# Patient Record
Sex: MALE | Race: WHITE | NOT HISPANIC OR LATINO | URBAN - METROPOLITAN AREA
[De-identification: names, ages, dates, MRNs, and addresses within clinical notes are randomized per-mention and may not be internally consistent; named-entity substitution may affect disease eponyms.]

---

## 2017-08-31 ENCOUNTER — HISTORICAL (OUTPATIENT)
Dept: RADIOLOGY | Facility: HOSPITAL | Age: 57
End: 2017-08-31

## 2017-09-20 ENCOUNTER — HISTORICAL (OUTPATIENT)
Dept: RADIOLOGY | Facility: HOSPITAL | Age: 57
End: 2017-09-20

## 2017-10-03 ENCOUNTER — HISTORICAL (OUTPATIENT)
Dept: ADMINISTRATIVE | Facility: HOSPITAL | Age: 57
End: 2017-10-03

## 2017-10-06 ENCOUNTER — HISTORICAL (OUTPATIENT)
Dept: RADIOLOGY | Facility: HOSPITAL | Age: 57
End: 2017-10-06

## 2017-11-28 ENCOUNTER — HISTORICAL (OUTPATIENT)
Dept: LAB | Facility: HOSPITAL | Age: 57
End: 2017-11-28

## 2017-11-28 ENCOUNTER — HISTORICAL (OUTPATIENT)
Dept: PREADMISSION TESTING | Facility: HOSPITAL | Age: 57
End: 2017-11-28

## 2017-11-28 LAB
ABS NEUT (OLG): 4.59 X10(3)/MCL (ref 2.1–9.2)
ALBUMIN SERPL-MCNC: 4.1 GM/DL (ref 3.4–5)
ALBUMIN/GLOB SERPL: 1.4 RATIO (ref 1.1–2)
ALP SERPL-CCNC: 58 UNIT/L (ref 50–136)
ALT SERPL-CCNC: 65 UNIT/L (ref 12–78)
APTT PPP: 29.7 SECOND(S) (ref 24.8–36.9)
AST SERPL-CCNC: 38 UNIT/L (ref 15–37)
BASOPHILS # BLD AUTO: 0 X10(3)/MCL (ref 0–0.2)
BASOPHILS NFR BLD AUTO: 0 %
BILIRUB SERPL-MCNC: 0.5 MG/DL (ref 0.2–1)
BILIRUBIN DIRECT+TOT PNL SERPL-MCNC: 0.2 MG/DL (ref 0–0.5)
BILIRUBIN DIRECT+TOT PNL SERPL-MCNC: 0.3 MG/DL (ref 0–0.8)
BUN SERPL-MCNC: 18 MG/DL (ref 7–18)
CALCIUM SERPL-MCNC: 10.9 MG/DL (ref 8.5–10.1)
CHLORIDE SERPL-SCNC: 105 MMOL/L (ref 98–107)
CO2 SERPL-SCNC: 27 MMOL/L (ref 21–32)
CREAT SERPL-MCNC: 1.03 MG/DL (ref 0.7–1.3)
EOSINOPHIL # BLD AUTO: 0.3 X10(3)/MCL (ref 0–0.9)
EOSINOPHIL NFR BLD AUTO: 3 %
ERYTHROCYTE [DISTWIDTH] IN BLOOD BY AUTOMATED COUNT: 13.2 % (ref 11.5–17)
GLOBULIN SER-MCNC: 3 GM/DL (ref 2.4–3.5)
GLUCOSE SERPL-MCNC: 70 MG/DL (ref 74–106)
HCT VFR BLD AUTO: 54.4 % (ref 42–52)
HGB BLD-MCNC: 18.5 GM/DL (ref 14–18)
INR PPP: 0.93 (ref 0–1.27)
LYMPHOCYTES # BLD AUTO: 2.7 X10(3)/MCL (ref 0.6–4.6)
LYMPHOCYTES NFR BLD AUTO: 33 %
MCH RBC QN AUTO: 31.6 PG (ref 27–31)
MCHC RBC AUTO-ENTMCNC: 34 GM/DL (ref 33–36)
MCV RBC AUTO: 92.8 FL (ref 80–94)
MONOCYTES # BLD AUTO: 0.6 X10(3)/MCL (ref 0.1–1.3)
MONOCYTES NFR BLD AUTO: 7 %
NEUTROPHILS # BLD AUTO: 4.59 X10(3)/MCL (ref 2.1–9.2)
NEUTROPHILS NFR BLD AUTO: 56 %
PLATELET # BLD AUTO: 162 X10(3)/MCL (ref 130–400)
PMV BLD AUTO: 9.5 FL (ref 9.4–12.4)
POTASSIUM SERPL-SCNC: 4.5 MMOL/L (ref 3.5–5.1)
PROT SERPL-MCNC: 7.1 GM/DL (ref 6.4–8.2)
PROTHROMBIN TIME: 12.7 SECOND(S) (ref 12.2–14.7)
RBC # BLD AUTO: 5.86 X10(6)/MCL (ref 4.7–6.1)
SODIUM SERPL-SCNC: 140 MMOL/L (ref 136–145)
WBC # SPEC AUTO: 8.2 X10(3)/MCL (ref 4.5–11.5)

## 2017-12-04 ENCOUNTER — HISTORICAL (OUTPATIENT)
Dept: SURGERY | Facility: HOSPITAL | Age: 57
End: 2017-12-04

## 2018-06-20 LAB — CRC RECOMMENDATION EXT: NORMAL

## 2019-03-12 ENCOUNTER — HISTORICAL (OUTPATIENT)
Dept: LAB | Facility: HOSPITAL | Age: 59
End: 2019-03-12

## 2019-06-17 ENCOUNTER — HISTORICAL (OUTPATIENT)
Dept: ADMINISTRATIVE | Facility: HOSPITAL | Age: 59
End: 2019-06-17

## 2019-06-24 ENCOUNTER — HISTORICAL (OUTPATIENT)
Dept: ADMINISTRATIVE | Facility: HOSPITAL | Age: 59
End: 2019-06-24

## 2019-07-01 ENCOUNTER — HISTORICAL (OUTPATIENT)
Dept: ADMINISTRATIVE | Facility: HOSPITAL | Age: 59
End: 2019-07-01

## 2019-08-13 ENCOUNTER — HISTORICAL (OUTPATIENT)
Dept: ADMINISTRATIVE | Facility: HOSPITAL | Age: 59
End: 2019-08-13

## 2019-09-03 ENCOUNTER — HISTORICAL (OUTPATIENT)
Dept: RADIOLOGY | Facility: HOSPITAL | Age: 59
End: 2019-09-03

## 2019-09-11 LAB
BUN SERPL-MCNC: 20 MG/DL (ref 7–18)
CALCIUM SERPL-MCNC: 10.2 MG/DL (ref 8.5–10.1)
CHLORIDE SERPL-SCNC: 109 MMOL/L (ref 98–107)
CO2 SERPL-SCNC: 26 MMOL/L (ref 21–32)
CREAT SERPL-MCNC: 1.14 MG/DL (ref 0.7–1.3)
CREAT/UREA NIT SERPL: 18
GLUCOSE SERPL-MCNC: 86 MG/DL (ref 74–106)
POTASSIUM SERPL-SCNC: 4.3 MMOL/L (ref 3.5–5.1)
SODIUM SERPL-SCNC: 140 MMOL/L (ref 136–145)

## 2019-09-26 ENCOUNTER — HISTORICAL (OUTPATIENT)
Dept: SURGERY | Facility: HOSPITAL | Age: 59
End: 2019-09-26

## 2019-11-13 ENCOUNTER — HISTORICAL (OUTPATIENT)
Dept: ADMINISTRATIVE | Facility: HOSPITAL | Age: 59
End: 2019-11-13

## 2019-12-30 ENCOUNTER — HISTORICAL (OUTPATIENT)
Dept: ADMINISTRATIVE | Facility: HOSPITAL | Age: 59
End: 2019-12-30

## 2020-02-20 ENCOUNTER — HISTORICAL (OUTPATIENT)
Dept: LAB | Facility: HOSPITAL | Age: 60
End: 2020-02-20

## 2020-02-20 LAB
ABS NEUT (OLG): 4.28 X10(3)/MCL (ref 2.1–9.2)
ALBUMIN SERPL-MCNC: 3.5 GM/DL (ref 3.4–5)
ALBUMIN/GLOB SERPL: 0.9 RATIO (ref 1.1–2)
ALP SERPL-CCNC: 80 UNIT/L (ref 50–136)
ALT SERPL-CCNC: 72 UNIT/L (ref 12–78)
APPEARANCE, UA: CLEAR
AST SERPL-CCNC: 49 UNIT/L (ref 15–37)
BACTERIA SPEC CULT: ABNORMAL /HPF
BASOPHILS # BLD AUTO: 0 X10(3)/MCL (ref 0–0.2)
BASOPHILS NFR BLD AUTO: 1 %
BILIRUB SERPL-MCNC: 0.7 MG/DL (ref 0.2–1)
BILIRUB UR QL STRIP: NEGATIVE
BILIRUBIN DIRECT+TOT PNL SERPL-MCNC: 0.2 MG/DL (ref 0–0.5)
BILIRUBIN DIRECT+TOT PNL SERPL-MCNC: 0.5 MG/DL (ref 0–0.8)
BUN SERPL-MCNC: 35 MG/DL (ref 7–18)
CALCIUM SERPL-MCNC: 10.2 MG/DL (ref 8.5–10.1)
CHLORIDE SERPL-SCNC: 106 MMOL/L (ref 98–107)
CHOLEST SERPL-MCNC: 132 MG/DL (ref 0–200)
CHOLEST/HDLC SERPL: 2.7 {RATIO} (ref 0–5)
CO2 SERPL-SCNC: 25 MMOL/L (ref 21–32)
COLOR UR: ABNORMAL
CREAT SERPL-MCNC: 1.05 MG/DL (ref 0.7–1.3)
EOSINOPHIL # BLD AUTO: 0.2 X10(3)/MCL (ref 0–0.9)
EOSINOPHIL NFR BLD AUTO: 3 %
ERYTHROCYTE [DISTWIDTH] IN BLOOD BY AUTOMATED COUNT: 15.4 % (ref 11.5–17)
GLOBULIN SER-MCNC: 3.8 GM/DL (ref 2.4–3.5)
GLUCOSE (UA): NEGATIVE
GLUCOSE SERPL-MCNC: 82 MG/DL (ref 74–106)
HCT VFR BLD AUTO: 56.9 % (ref 42–52)
HDLC SERPL-MCNC: 49 MG/DL (ref 35–60)
HGB BLD-MCNC: 18.2 GM/DL (ref 14–18)
HGB UR QL STRIP: ABNORMAL
INFLUENZA A ANTIGEN, POC: NEGATIVE
INFLUENZA B ANTIGEN, POC: NEGATIVE
KETONES UR QL STRIP: ABNORMAL
LDLC SERPL CALC-MCNC: 69 MG/DL (ref 0–129)
LEUKOCYTE ESTERASE UR QL STRIP: ABNORMAL
LYMPHOCYTES # BLD AUTO: 1.9 X10(3)/MCL (ref 0.6–4.6)
LYMPHOCYTES NFR BLD AUTO: 27 %
MCH RBC QN AUTO: 29 PG (ref 27–31)
MCHC RBC AUTO-ENTMCNC: 32 GM/DL (ref 33–36)
MCV RBC AUTO: 90.6 FL (ref 80–94)
MONOCYTES # BLD AUTO: 0.6 X10(3)/MCL (ref 0.1–1.3)
MONOCYTES NFR BLD AUTO: 8 %
NEUTROPHILS # BLD AUTO: 4.28 X10(3)/MCL (ref 2.1–9.2)
NEUTROPHILS NFR BLD AUTO: 61 %
NITRITE UR QL STRIP: POSITIVE
PH UR STRIP: 5.5 [PH] (ref 5–9)
PLATELET # BLD AUTO: 172 X10(3)/MCL (ref 130–400)
PMV BLD AUTO: 9.3 FL (ref 9.4–12.4)
POTASSIUM SERPL-SCNC: 4.4 MMOL/L (ref 3.5–5.1)
PROT SERPL-MCNC: 7.3 GM/DL (ref 6.4–8.2)
PROT UR QL STRIP: ABNORMAL
PSA SERPL-MCNC: 33.9 NG/ML (ref 0–4)
RBC # BLD AUTO: 6.28 X10(6)/MCL (ref 4.7–6.1)
RBC #/AREA URNS HPF: ABNORMAL /[HPF]
SODIUM SERPL-SCNC: 139 MMOL/L (ref 136–145)
SP GR UR STRIP: 1.03 (ref 1–1.03)
SQUAMOUS EPITHELIAL, UA: ABNORMAL
TRIGL SERPL-MCNC: 71 MG/DL (ref 30–150)
TSH SERPL-ACNC: 2.61 MIU/L (ref 0.36–3.74)
UROBILINOGEN UR STRIP-ACNC: 0.2
VLDLC SERPL CALC-MCNC: 14 MG/DL
WBC # SPEC AUTO: 7 X10(3)/MCL (ref 4.5–11.5)
WBC #/AREA URNS HPF: 90 /HPF (ref 0–3)

## 2020-03-10 LAB
INFLUENZA A ANTIGEN, POC: NEGATIVE
INFLUENZA B ANTIGEN, POC: NEGATIVE

## 2020-05-20 ENCOUNTER — HISTORICAL (OUTPATIENT)
Dept: RADIOLOGY | Facility: HOSPITAL | Age: 60
End: 2020-05-20

## 2020-05-20 LAB — POC CREATININE: 1 MG/DL (ref 0.6–1.3)

## 2020-06-05 ENCOUNTER — HISTORICAL (OUTPATIENT)
Dept: ADMINISTRATIVE | Facility: HOSPITAL | Age: 60
End: 2020-06-05

## 2021-03-25 ENCOUNTER — HISTORICAL (OUTPATIENT)
Dept: RADIOLOGY | Facility: HOSPITAL | Age: 61
End: 2021-03-25

## 2021-04-13 ENCOUNTER — HISTORICAL (OUTPATIENT)
Dept: CARDIOLOGY | Facility: HOSPITAL | Age: 61
End: 2021-04-13

## 2022-02-22 ENCOUNTER — HISTORICAL (OUTPATIENT)
Dept: ADMINISTRATIVE | Facility: HOSPITAL | Age: 62
End: 2022-02-22

## 2022-03-08 ENCOUNTER — HISTORICAL (OUTPATIENT)
Dept: ADMINISTRATIVE | Facility: HOSPITAL | Age: 62
End: 2022-03-08

## 2022-04-12 ENCOUNTER — HISTORICAL (OUTPATIENT)
Dept: ADMINISTRATIVE | Facility: HOSPITAL | Age: 62
End: 2022-04-12
Payer: COMMERCIAL

## 2022-04-30 VITALS
SYSTOLIC BLOOD PRESSURE: 100 MMHG | BODY MASS INDEX: 34 KG/M2 | WEIGHT: 288 LBS | OXYGEN SATURATION: 99 % | DIASTOLIC BLOOD PRESSURE: 80 MMHG | HEIGHT: 77 IN

## 2022-04-30 NOTE — OP NOTE
DATE OF SURGERY:    09/26/2019    SURGEON:  Pablito Abad Jr, MD  ASSISTANT:  None    PREOPERATIVE DIAGNOSIS:  Right medial femoral condyle osteochondral defect.    POSTOPERATIVE DIAGNOSES:    1. Right medial femoral condyle osteochondral defect.  2. Right lateral meniscus tear.    PROCEDURE:    1. Right knee arthroscopic osteochondral allograft transfer.  2. Right knee arthroscopic partial lateral meniscectomy.    ANESTHESIA:  General plus regional.    COMPLICATIONS:  None.    IMPLANTS:  Three allograft 10 mm osteochondral plugs.    HISTORY OF PRESENT ILLNESS:  Zachery is a very pleasant 59-year-old who was carrying a mattress up the stairs and sustained an osteochondral lesion to the medial femoral condyle of his right knee.  He had persistent pain medially with mechanical symptoms.  MRI showed the osteochondral defect.  I recommended osteochondral allograft transfer.  I discussed with him the risks, benefits, and alternatives to therapy, and he elected to proceed.    DESCRIPTION OF PROCEDURE:  Zachery was initially seen in the preoperative unit where his History and Physical were reviewed without change.  His knee was marked. Consents were reviewed.  All questions were answered.  He was taken to the operating room and placed supine on the operating table where general anesthesia was induced.  His right lower extremity was prepped and draped in a sterile fashion.  Attending led timeout, confirmed the operative side.  Preoperative antibiotics were administered.  I then began the procedure.     I started with a standard arthroscopic portal and introduced the trocar atraumatically into the knee.  His patellofemoral joint was clean, his medial and lateral gutters were clear, I came down the medial side.  He had a large osteochondral defect which was approximately 3 cm x 1 cm.  This was in a striped type fashion.  His tibial plateau was intact.  His medial meniscus was intact.  I turned my attention to the  notch.  The ACL was intact.  His lateral meniscus had a central tear which I debrided with a shaver.  This was located on the posterior horn.  His lateral cartilage was intact.  I turned my attention back to the medial side.  I made a large portal in line with the defect and then used the Synthes system in order to drill a perpendicular hole at 10 mm.  A started far posteriorly and then gradually worked mainly anterior along the condyle, so held in flexion.  I drilled and then measured the plug at a size 12 mm and gently impacted the plug in place after cleaning the socket of debris. I then moved anteriorly and slightly overlapped the next plug and impacted this again in place flush with the surrounding articular cartilage.  I then moved far anteriorly and again over-reamed to a size 13-14 mm plug and then again impacted a 3rd plug in place.  On this anterior plug, I left a cortical rim to separate my 2nd and 3rd plug, so to not create instability.  After this, I took my final arthroscopic pictures, I then drained the arthroscopic fluid, and closed the incision in layers.  A sterile dressing was placed.  He was awoken from anesthesia and transferred to the postop unit in good condition.        ______________________________  MD NELDA Carver Jr/VIRGIL  DD:  09/26/2019  Time:  12:37PM  DT:  09/26/2019  Time:  01:09PM  Job #:  [id number]

## 2022-04-30 NOTE — OP NOTE
DATE OF SURGERY:    12/04/2017    SURGEON:  Dnay Denson MD    PREOPERATIVE DIAGNOSIS:  Left parotid mass.    POSTOPERATIVE DIAGNOSIS:  Left parotid mass.    PROCEDURE PERFORMED:  Left total parotidectomy with removal of parotid mass on the left with preservation of facial nerve.    ANESTHESIA:  General.    INDICATIONS FOR PROCEDURE:  This is a pleasant gentleman with a history of a left parotid mass.  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  The patient was counseled on the risks, benefits and indications including possible facial palsy and wished to proceed forward.    PROCEDURE:  Patient was brought to the operative suite and placed in the supine position.  Anesthesia administered general anesthesia with endotracheal intubation using a short-acting paralytic.  The facial nerve monitoring was calibrated appropriately, and the patient was then prepped and draped in a sterile fashion.  The patient's incision was then injected with 3 cc of 1% lidocaine with 1:100,000 epinephrine.  A modified Jean incision was then made with a #15 blade.  A soft tissue flap was elevated anteriorly overlying the mass over the starting of the superior aspect of the incision.  Dissection continued medially along the patient's cartilaginous external auditory canal.  The temporal meatal suture line was then palpated and dissection carefully ensued to aid in visualization.  We then turned our attention to the tail of the parotid overlying the sternocleidomastoid muscle.  This was carefully elevated and bluntly dissected off the sternocleidomastoid muscle.  Now that full view of the area was in visualization, the dissection continued carefully until the trunk of the facial nerve was identified and stimulated.  Careful dissection along the trunk was performed bluntly and the superior parotid tissue was incised carefully visualizing the facial nerve inferiorly.  Once the pes was identified, we carried our dissection  along the superior and inferior trunks.  At this point it was noted that directly medial to the facial nerve was the patient's mass.  The mass itself was displacing the facial nerve laterally.  This was carefully dissected from the mass.  After careful dissection the mass was removed.  The facial nerve was then stimulated and did not appear to have any stimulus of the facial muscles.  The nerve was carefully traced and noted to be in continuity.  The distal aspects of the pes at inferior and superior branches were stimulated and intact.  Because the nerve is in continuity, this was most likely a stretch injury secondary to the patient's mass involving the facial nerve and displacing it laterally.  The wound bed was irrigated with copious amounts of normal saline solution.  A drain was then placed within the parotid bed.  The parotid parenchyma was sutured with 3-0 Vicryl suture in the deep layers of the skin was sewed with 3-0 Vicryl suture.  The skin was then reapproximated     anteriorly with 5-0 nylon suture and postauricular with staples.  The patient was then placed in a head dress and then turned over to anesthesia for extubation.  The patient tolerated the procedure well.        ______________________________  MD CAM Wright/DEMOND  DD:  12/08/2017  Time:  09:26AM  DT:  12/08/2017  Time:  06:01PM  Job #:  [id number]

## 2022-05-05 NOTE — HISTORICAL [facility] C[facility]ER
This is a historical note converted from Gilda. Formatting and pictures may have been removed.  Please reference Gilda for original formatting and attached multimedia. Chief Complaint  Surgery followup  History of Present Illness  9/26/2019:?Right knee?osteochondral allograft transfer  ?   He returns today. He is working with therapy. ?He is been nonweightbearing without pain.  Physical Exam  His incisions healed. ?Range of motion?nearly full. ?No effusion  Assessment/Plan  1.?Osteochondral defect of condyle of femur?M95.8  Doing well status post above. ?Continue rehab. ?I will see him back in 6 weeks?with radiographs of his right knee and?Lysholm score. 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 12/25/19 3:00:00 CST, Order for future visit, Osteochondral defect of condyle of femur, Orthopaedics  Post-Op follow-up visit [id number] , Osteochondral defect of condyle of femur, Orthopaedics Clinic, 11/13/19 11:35:00 CST  ?  Referrals  Clinic Follow up, *Est. 12/25/19 3:00:00 CST, Order for future visit, Osteochondral defect of condyle of femur, [facility]   Problem List/Past Medical History  Ongoing  Dyslipidemia  HTN (Hypertension), Benign(  Confirmed  )  Hypothyroid(  Confirmed  )  Osteochondral defect of condyle of femur  Uses hearing aid  Historical  No qualifying data  Procedure/Surgical History  Arthroscopy Oats Procedure (Right) (09/26/2019)  Arthroscopy, knee, surgical; osteochondral allograft (eg, mosaicplasty) (09/26/2019)  Arthroscopy, knee, surgical; with meniscectomy (medial OR lateral, including any meniscal shaving) including debridement/shaving of articular cartilage (chondroplasty), same or separate compartment(s), when performed (09/26/2019)  Excision of Right Knee Joint, Percutaneous Endoscopic Approach (09/26/2019)  Injection, anesthetic agent; other peripheral nerve or branch (09/26/2019)  Introduction of Anesthetic Agent into Peripheral Nerves and Plexi,  Percutaneous Approach (09/26/2019)  Supplement Right Knee Joint with Nonautologous Tissue Substitute, Percutaneous Endoscopic Approach (09/26/2019)  Debridement (eg, high pressure waterjet with/without suction, sharp selective debridement with scissors, scalpel and forceps), open wound, (eg, fibrin, devitalized epidermis and/or dermis, exudate, debris, biofilm), including topical application(s), wound (06/17/2019)  Extraction of Left Lower Leg Skin, External Approach (06/17/2019)  Colonoscopy (06/20/2018)  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; total, with dissection and preservation of facial nerve (12/04/2017)  Parotidectomy w/ Monitoring (Left) (12/04/2017)  Resection of Left Parotid Gland, Open Approach (12/04/2017)  Biopsy of salivary gland; needle (10/06/2017)  Drainage of Minor Salivary Gland, Percutaneous Approach, Diagnostic (10/06/2017)  Biopsy of prostate  colonoscopy   Medications  22G needle with syr., See Instructions, 11 refills  aspirin 81 mg oral Delayed Release (EC) tablet  Chondroitin-Glucosamine, 1 cap(s), Oral, BID  Fish Oil 1200 mg oral capsule, 2400 mg= 2 cap(s), Oral, BID  ibuprofen 800 mg oral tablet, See Instructions  levothyroxine 50 mcg (0.05 mg) oral tablet, 50 mcg= 1 tab(s), Oral, Daily, 3 refills  melatonin 5 mg oral tablet, 5 mg= 1 tab(s), Oral, Once a day (at bedtime), PRN  Multiple Vitamins oral capsule, Oral, Daily  mupirocin 2% topical oint, 1 giancarlo, TOP, TID,? ?Not Taking, Completed Rx: Last Dose Date/Time Unknown  simvastatin 20 mg oral tablet, See Instructions, 2 refills  telmisartan 80 mg oral tablet, See Instructions, 2 refills  testosterone cypionate 200 mg/mL intramuscular solution, 200 mg= 1 mL, IM, q2wk, 5 refills  traMADol 50 mg oral tablet, 50 mg= 1 tab(s), Oral, q6hr, PRN  Allergies  No Known Allergies  Social History  Abuse/Neglect  No, 10/11/2019  Alcohol  Current, Alcohol use interferes with work or home: No. Drinks more than intended: No. Others hurt by  drinking: No. Ready to change: No. Household alcohol concerns: No., 02/10/2017  Employment/School  Employed, 09/11/2019  Exercise  Exercise frequency: Daily., 08/13/2015  Home/Environment  Lives with Spouse., 08/13/2015  Nutrition/Health  Regular, 08/13/2015  Sexual  Sexually active: Yes., 08/13/2015  Substance Use  Never, 08/13/2015  Tobacco  Never (less than 100 in lifetime), N/A, 10/11/2019  Family History  Heart disease.....: Mother.  Hypertension.: Father.  Primary malignant neoplasm of colon: Father.  Immunizations  Vaccine Date Status Comments   zoster vaccine, inactivated 12/13/2018 Recorded    influenza virus vaccine, inactivated 11/20/2018 Recorded    tetanus-diphtheria toxoids 08/24/2018 Given Grifols is the manufacture but was not an option   influenza virus vaccine, inactivated 11/07/2017 Recorded    influenza virus vaccine, inactivated 11/03/2016 Recorded    influenza virus vaccine, inactivated 10/19/2015 Recorded    tetanus/diphtheria/pertussis, acel(Tdap) 11/20/2014 Recorded    zoster vaccine live 11/13/2014 Recorded    influenza virus vaccine, inactivated 11/13/2014 Recorded    influenza virus vaccine, inactivated 11/11/2014 Recorded    zoster vaccine live 08/07/2014 Recorded    influenza virus vaccine, inactivated 11/15/2013 Recorded    influenza virus vaccine, inactivated 10/16/2011 Recorded 2018-11-21: UNKNOWN CAMPAIGNID   influenza virus vaccine, inactivated 10/20/2010 Recorded    Health Maintenance  Health Maintenance  ???Pending?(in the next year)  ??? ??OverDue  ??? ? ? ?Diabetes Screening due??and every?  ??? ??Due?  ??? ? ? ?Influenza Vaccine due??11/13/19??and every?  ??? ??Due In Future?  ??? ? ? ?Alcohol Misuse Screening not due until??01/01/20??and every 1??year(s)  ??? ? ? ?Obesity Screening not due until??01/01/20??and every 1??year(s)  ??? ? ? ?Depression Screening not due until??03/11/20??and every 1??year(s)  ??? ? ? ?Aspirin Therapy for CVD Prevention not due until??03/12/20??and  every 1??year(s)  ??? ? ? ?Hypertension Management-Education not due until??03/12/20??and every 1??year(s)  ??? ? ? ?ADL Screening not due until??08/06/20??and every 1??year(s)  ??? ? ? ?Hypertension Management-BMP not due until??09/10/20??and every 1??year(s)  ??? ? ? ?Blood Pressure Screening not due until??10/10/20??and every 1??year(s)  ??? ? ? ?Body Mass Index Check not due until??10/10/20??and every 1??year(s)  ??? ? ? ?Hypertension Management-Blood Pressure not due until??10/10/20??and every 1??year(s)  ???Satisfied?(in the past 1 year)  ??? ??Satisfied?  ??? ? ? ?ADL Screening on??08/06/19.??Satisfied by Jeff Walker LPN  ??? ? ? ?Alcohol Misuse Screening on??03/12/19.??Satisfied by Jena Byers LPN G.  ??? ? ? ?Aspirin Therapy for CVD Prevention on??03/12/19.??Satisfied by Jena Byers LPN G.  ??? ? ? ?Blood Pressure Screening on??10/11/19.??Satisfied by Deidre Perkins LPN  ??? ? ? ?Body Mass Index Check on??10/11/19.??Satisfied by Deidre Perkins LPN  ??? ? ? ?Depression Screening on??03/12/19.??Satisfied by Jena Byers LPN G.  ??? ? ? ?Diabetes Screening on??09/11/19.??Satisfied by Abner Desai  ??? ? ? ?Hypertension Management-Blood Pressure on??10/11/19.??Satisfied by Deidre Perkins LPN  ??? ? ? ?Influenza Vaccine on??11/20/18.??Satisfied by Jena Byers LPN G.  ??? ? ? ?Lipid Screening on??02/21/19.??Satisfied by Jena Byers LPN  ??? ? ? ?Obesity Screening on??10/11/19.??Satisfied by Deidre Perkins LPN  ??? ? ? ?Zoster Vaccine on??12/13/18.??Satisfied by Jena Byers LPN  ?  Diagnostic Results  Knee radiographs show appropriate plug position

## 2022-05-05 NOTE — HISTORICAL [facility]
This is a historical note converted from Gilda. Formatting and pictures may have been removed.  Please reference Gilda for original formatting and attached multimedia. Chief Complaint  6 week flu Right knee OATS sx 9/26/19 Denies pain at this time.  History of Present Illness  9/26/2019:?Right knee?osteochondral allograft transfer  ?   He returns today. His pains under good control. ?He is completed therapy.  Review of Systems  Comprehensive review of system?was performed with no exceptions other than noted in the history of present illness  Physical Exam  Vitals & Measurements  T:?36.2? ?C (Oral)? HR:?80(Peripheral)? BP:?132/78?  HT:?196?cm? WT:?115.66?kg? BMI:?30.11?  Gen: WN, WD, NAD  Card/Res: NL breathing, +distal pulses  Abdomen: ND  Standing exam  stance: normal alignment, no significant leg-length discrepancy  gait: no limp  ?   Knee examination  - General comments: unremarkable appearance  ?   - Tenderness: None  ?   Knee??????????RIGHT??????????LEFT  Skin: ??????????Intact ??????????Intact  ROM:??????????0-130??????????0-130  Effusion:????? Neg ???????????? Neg  MJL TTP:????? Neg ???????????? Neg  LJL TTP: ?????? Neg ???????????? Neg  David:? ?Neg ???????????? Neg  Pat crep:?????? Neg ???????????????Neg  Patella TTPs: Neg ???????????????Neg  Patella grind: Neg??????????? ?Neg  Lachman: ?????Neg ????????????????????Neg  Pivot shift: ?????Neg ???????????? Neg  Valgus stress: Neg ???????????????Neg  Varus stress: Neg ???????????????Neg  Posterior drawer: Neg ??????????Neg  ?   N-V ????????????????????intact??????????intact  Hip:?????????????????????????nml?????????? nml  ?   Lower extremity edema:Negative  ?  ?  Lysholm:  9/4/2019: 40  12/30/2019: 96  Assessment/Plan  1.?Osteochondral defect of condyle of femur?M95.8  Doing great status post above. ?Activities as tolerated. ?I will see him back as needed  Ordered:  Office/Outpatient Visit Level 3 Established [id number] PC, Osteochondral defect of condyle of  femur, [facility], 12/30/19 9:00:00 CST  ?  Orders:  Clinic Follow-up PRN, 12/30/19 9:00:00 CST, Future Order, [facility]  Referrals  Clinic Follow-up PRN, 12/30/19 9:00:00 CST, Future Order, [facility]   Problem List/Past Medical History  Ongoing  Dyslipidemia  HTN (Hypertension), Benign(  Confirmed  )  Hypothyroid(  Confirmed  )  Osteochondral defect of condyle of femur  Uses hearing aid  Historical  No qualifying data  Procedure/Surgical History  Arthroscopy Oats Procedure (Right) (09/26/2019)  Arthroscopy, knee, surgical; osteochondral allograft (eg, mosaicplasty) (09/26/2019)  Arthroscopy, knee, surgical; with meniscectomy (medial OR lateral, including any meniscal shaving) including debridement/shaving of articular cartilage (chondroplasty), same or separate compartment(s), when performed (09/26/2019)  Excision of Right Knee Joint, Percutaneous Endoscopic Approach (09/26/2019)  Injection, anesthetic agent; other peripheral nerve or branch (09/26/2019)  Introduction of Anesthetic Agent into Peripheral Nerves and Plexi, Percutaneous Approach (09/26/2019)  Supplement Right Knee Joint with Nonautologous Tissue Substitute, Percutaneous Endoscopic Approach (09/26/2019)  Debridement (eg, high pressure waterjet with/without suction, sharp selective debridement with scissors, scalpel and forceps), open wound, (eg, fibrin, devitalized epidermis and/or dermis, exudate, debris, biofilm), including topical application(s), wound (06/17/2019)  Extraction of Left Lower Leg Skin, External Approach (06/17/2019)  Colonoscopy (06/20/2018)  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; total, with dissection and preservation of facial nerve (12/04/2017)  Parotidectomy w/ Monitoring (Left) (12/04/2017)  Resection of Left Parotid Gland, Open Approach (12/04/2017)  Biopsy of salivary gland; needle (10/06/2017)  Drainage of Minor Salivary Gland, Percutaneous Approach, Diagnostic (10/06/2017)  Biopsy of  prostate  colonoscopy   Medications  22G needle with syr., See Instructions, 11 refills  aspirin 81 mg oral Delayed Release (EC) tablet  Chondroitin-Glucosamine, 1 cap(s), Oral, BID  Fish Oil 1200 mg oral capsule, 2400 mg= 2 cap(s), Oral, BID  ibuprofen 800 mg oral tablet, See Instructions,? ?Not taking  levothyroxine 50 mcg (0.05 mg) oral tablet, 50 mcg= 1 tab(s), Oral, Daily, 3 refills  melatonin 5 mg oral tablet, 5 mg= 1 tab(s), Oral, Once a day (at bedtime), PRN  Multiple Vitamins oral capsule, Oral, Daily  mupirocin 2% topical oint, 1 giancarlo, TOP, TID,? ?Not Taking, Completed Rx: Last Dose Date/Time Unknown  simvastatin 20 mg oral tablet, See Instructions, 2 refills  telmisartan 80 mg oral tablet, See Instructions, 2 refills  testosterone cypionate 200 mg/mL intramuscular solution, 200 mg= 1 mL, IM, q2wk, 5 refills  traMADol 50 mg oral tablet, 50 mg= 1 tab(s), Oral, q6hr, PRN,? ?Not taking  Allergies  No Known Allergies  Social History  Abuse/Neglect  No, 12/30/2019  Alcohol  Current, Alcohol use interferes with work or home: No. Drinks more than intended: No. Others hurt by drinking: No. Ready to change: No. Household alcohol concerns: No., 02/10/2017  Employment/School  Employed, 09/11/2019  Exercise  Exercise frequency: Daily., 08/13/2015  Home/Environment  Lives with Spouse., 08/13/2015  Nutrition/Health  Regular, 08/13/2015  Sexual  Sexually active: Yes., 08/13/2015  Substance Use  Never, 08/13/2015  Tobacco  Never (less than 100 in lifetime), No, 12/30/2019  Family History  Heart disease.....: Mother.  Hypertension.: Father.  Primary malignant neoplasm of colon: Father.  Immunizations  Vaccine Date Status Comments   zoster vaccine, inactivated 12/13/2018 Recorded    influenza virus vaccine, inactivated 11/20/2018 Recorded    tetanus-diphtheria toxoids 08/24/2018 Given Grifols is the manufacture but was not an option   influenza virus vaccine, inactivated 11/07/2017 Recorded    influenza virus vaccine,  inactivated 11/03/2016 Recorded    influenza virus vaccine, inactivated 10/19/2015 Recorded    tetanus/diphtheria/pertussis, acel(Tdap) 11/20/2014 Recorded    zoster vaccine live 11/13/2014 Recorded    influenza virus vaccine, inactivated 11/13/2014 Recorded    influenza virus vaccine, inactivated 11/11/2014 Recorded    zoster vaccine live 08/07/2014 Recorded    influenza virus vaccine, inactivated 11/15/2013 Recorded    influenza virus vaccine, inactivated 10/16/2011 Recorded 2018-11-21: UNKNOWN CAMPAIGNID   influenza virus vaccine, inactivated 10/20/2010 Recorded    Health Maintenance  Health Maintenance  ???Pending?(in the next year)  ??? ??OverDue  ??? ? ? ?Diabetes Screening due??and every?  ??? ??Due?  ??? ? ? ?Influenza Vaccine due??12/30/19??and every?  ??? ??Due In Future?  ??? ? ? ?Alcohol Misuse Screening not due until??01/01/20??and every 1??year(s)  ??? ? ? ?Obesity Screening not due until??01/01/20??and every 1??year(s)  ??? ? ? ?Depression Screening not due until??03/11/20??and every 1??year(s)  ??? ? ? ?Aspirin Therapy for CVD Prevention not due until??03/12/20??and every 1??year(s)  ??? ? ? ?Hypertension Management-Education not due until??03/12/20??and every 1??year(s)  ??? ? ? ?ADL Screening not due until??08/06/20??and every 1??year(s)  ??? ? ? ?Hypertension Management-BMP not due until??09/10/20??and every 1??year(s)  ??? ? ? ?Blood Pressure Screening not due until??12/29/20??and every 1??year(s)  ??? ? ? ?Body Mass Index Check not due until??12/29/20??and every 1??year(s)  ??? ? ? ?Hypertension Management-Blood Pressure not due until??12/29/20??and every 1??year(s)  ???Satisfied?(in the past 1 year)  ??? ??Satisfied?  ??? ? ? ?ADL Screening on??08/06/19.??Satisfied by Jeff Walker LPN  ??? ? ? ?Alcohol Misuse Screening on??03/12/19.??Satisfied by Jena Byers LPN  ??? ? ? ?Aspirin Therapy for CVD Prevention on??03/12/19.??Satisfied by Jena Byers LPN  ??? ? ? ?Blood Pressure  Screening on??12/30/19.??Satisfied by Nayely Gibbs LPN  ??? ? ? ?Body Mass Index Check on??12/30/19.??Satisfied by Nayely Gibbs LPN  ??? ? ? ?Depression Screening on??03/12/19.??Satisfied by Jena Byers LPN  ??? ? ? ?Diabetes Screening on??09/11/19.??Satisfied by Abner Desai  ??? ? ? ?Hypertension Management-Blood Pressure on??12/30/19.??Satisfied by Nayely Gibbs LPN  ??? ? ? ?Lipid Screening on??02/21/19.??Satisfied by Jena Byers LPN  ??? ? ? ?Obesity Screening on??12/30/19.??Satisfied by Nayely Gibbs LPN  ?     [1] Office Visit Note; Jenniffer ALDANA MD, Pablito DONALD 09/04/2019 11:32 CDT

## 2022-05-05 NOTE — HISTORICAL [facility]
This is a historical note converted from Gilda. Formatting and pictures may have been removed.  Please reference Gilda for original formatting and attached multimedia. Chief Complaint  Est patient here with Lt shoulder pain. No injury. Ref Dr. Bass. Xrays today.  History of Present Illness  He is a pleasant 60-year-old whose had left shoulder pain since April 2020. ?The pains located?laterally and posterior. ?He notes it worse?throughout the day. ?He is tried anti-inflammatory medicines without relief.? He occasionally will have some numbness and tingling down the arm but that seems to resolve.? He is a status post MRI.  Review of Systems  Comprehensive review of system?was performed with no exceptions other than noted in the history of present illness  Physical Exam  Vitals & Measurements  HR:?91(Peripheral)? BP:?118/82?  HT:?196?cm? WT:?121.56?kg? BMI:?31.64?  Gen: WN, WD, NAD  Card/Res: NL breathing, +distal pulses  Abdomen: ND  Shoulder Exam:??????????Right??????????Left  Skin:??????????????????????????????Normal???????Normal  AC joint tenderness:??????????None??????????None  Forward Flexion:?????????????180 ??????????180  Abduction:?????????????????????180??????????? 180  External Rotation: ????????????? 80??????????????80  Internal Rotation?????????????? 80 ???????????? 80  Supraspinatus stress test?????? Neg??????????+  Cedillo Impingement:?? ?????Neg ?????????? ?Neg  Neer Impingement:?????????????Neg??????????Neg  Apprehension:???????????????????? Neg??????????Neg  OBriens:????????????????????????????Neg????????? +  Speeds test:??????????????????????? Neg??????????Neg  Strength:  External Rotation:???????????????5/5???????????????5/5  Lift Off/belly press:????????????5/5???????????????5/5  ?   N-V status:?????????????????????????Intact??????????Intact  ?   C-spine: Normal ROM, NT  ?  ?  Assessment/Plan  1.?Partial tear of rotator cuff?M75.110  ?We will start some formal physical therapy and an injection  today. ?If his pain persist will consider arthroscopic intervention  ?  Risks of cortisone were discussed with the patient including hypopigmentation subcutaneous fat atrophy, and post injection pain flare. The patient understood these risks and requested to proceed. The?left shoulder?was prepped with betadine. The 1cc of steroid and 3cc of lidocaine injection was administered under sterile techinique. The injection was administered in clinic by me, Pablito Abad, and the patient tolerated the procedure well.  ?  Ordered:  betamethasone, 12 mg, Intra-Articular, Once, first dose 06/05/20 10:00:00 CDT, stop date 06/05/20 10:00:00 CDT  Lidocaine inj., 2 mL, Intra-Articular, Once, first dose 06/05/20 9:23:00 CDT, stop date 06/05/20 9:23:00 CDT  asp/inj jnt/bursa, major [id number] , 06/05/20 9:23:00 CDT, [facility]ics Clinic, Routine, 06/05/20 9:23:00 CDT, Partial tear of rotator cuff  SLAP tear of shoulder  Clinic Follow up, *Est. 07/17/20 3:00:00 CDT, Order for future visit, Partial tear of rotator cuff  SLAP tear of shoulder, Orthopaedics  Office/Outpatient Visit Level 3 Established [id number] PC, Partial tear of rotator cuff  SLAP tear of shoulder, Orthopaedics Clinic, 06/05/20 9:23:00 CDT  ?  2.?SLAP tear of shoulder?S43.439A  Ordered:  betamethasone, 12 mg, Intra-Articular, Once, first dose 06/05/20 10:00:00 CDT, stop date 06/05/20 10:00:00 CDT  Lidocaine inj., 2 mL, Intra-Articular, Once, first dose 06/05/20 9:23:00 CDT, stop date 06/05/20 9:23:00 CDT  asp/inj jnt/bursa, major [id number] PC, 06/05/20 9:23:00 CDT, Orthopaedics Clinic, Routine, 06/05/20 9:23:00 CDT, Partial tear of rotator cuff  SLAP tear of shoulder  Clinic Follow up, *Est. 07/17/20 3:00:00 CDT, Order for future visit, Partial tear of rotator cuff  SLAP tear of shoulder, Orthopaedics  Office/Outpatient Visit Level 3 Established [id number] PC, Partial tear of rotator cuff  SLAP tear of shoulder, Orthopaedics Clinic, 06/05/20 9:23:00 CDT  ?  Orders:  XR  Shoulder Left Minimum 2 Views, Routine, 06/05/20 8:58:00 CDT, None, Patient Bed, Patient Has IV?, Rad Type, Left shoulder pain, Not Scheduled, 06/05/20 8:58:00 CDT  Referrals  Clinic Follow up, *Est. 07/17/20 3:00:00 CDT, Order for future visit, Partial tear of rotator cuff  SLAP tear of shoulder, [facility]   Problem List/Past Medical History  Ongoing  Dyslipidemia  HTN (Hypertension), Benign(  Confirmed  )  Hypothyroid(  Confirmed  )  Osteochondral defect of condyle of femur  Uses hearing aid  Historical  No qualifying data  Procedure/Surgical History  Arthroscopy Oats Procedure (Right) (09/26/2019)  Arthroscopy, knee, surgical; osteochondral allograft (eg, mosaicplasty) (09/26/2019)  Arthroscopy, knee, surgical; with meniscectomy (medial OR lateral, including any meniscal shaving) including debridement/shaving of articular cartilage (chondroplasty), same or separate compartment(s), when performed (09/26/2019)  Excision of Right Knee Joint, Percutaneous Endoscopic Approach (09/26/2019)  Injection, anesthetic agent; other peripheral nerve or branch (09/26/2019)  Introduction of Anesthetic Agent into Peripheral Nerves and Plexi, Percutaneous Approach (09/26/2019)  Supplement Right Knee Joint with Nonautologous Tissue Substitute, Percutaneous Endoscopic Approach (09/26/2019)  Debridement (eg, high pressure waterjet with/without suction, sharp selective debridement with scissors, scalpel and forceps), open wound, (eg, fibrin, devitalized epidermis and/or dermis, exudate, debris, biofilm), including topical application(s), wound (06/17/2019)  Extraction of Left Lower Leg Skin, External Approach (06/17/2019)  Colonoscopy (06/20/2018)  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; total, with dissection and preservation of facial nerve (12/04/2017)  Parotidectomy w/ Monitoring (Left) (12/04/2017)  Resection of Left Parotid Gland, Open Approach (12/04/2017)  Biopsy of salivary gland; needle (10/06/2017)  Drainage  of Minor Salivary Gland, Percutaneous Approach, Diagnostic (10/06/2017)  Biopsy of prostate  colonoscopy   Medications  22G needle with syr., See Instructions, 11 refills  aspirin 81 mg oral Delayed Release (EC) tablet, 81 mg= 1 tab(s), Oral, Daily, 3 refills  cefuroxime 500 mg oral tablet, 500 mg= 1 tab(s), Oral, BID  Celestone, 12 mg, Intra-Articular, Once  Chondroitin-Glucosamine, 1 cap(s), Oral, BID  finasteride 5 mg oral tablet, 5 mg= 1 tab(s), Oral, Daily  Fish Oil 1200 mg oral capsule, 2400 mg= 2 cap(s), Oral, BID  ibuprofen 800 mg oral tablet, 800 mg= 1 tab(s), Oral, q8hr  levothyroxine 50 mcg (0.05 mg) oral tablet, See Instructions  lidocaine 2% injectable solution, 2 mL, Intra-Articular, Once  melatonin 5 mg oral tablet, 5 mg= 1 tab(s), Oral, Once a day (at bedtime), PRN  Mobic 7.5 mg oral tablet, 15 mg= 2 tab(s), Oral, Daily  Multiple Vitamins oral capsule, Oral, Daily  simvastatin 20 mg oral tablet, See Instructions, 2 refills  tamsulosin 0.4 mg oral capsule, 0.4 mg= 1 cap(s), Oral, qPM  telmisartan 80 mg oral tablet, See Instructions  testosterone cypionate 200 mg/mL intramuscular solution, 200 mg= 1 mL, IM, q2wk, 5 refills  Allergies  No Known Allergies  Social History  Abuse/Neglect  No, 06/05/2020  Alcohol  Current, Alcohol use interferes with work or home: No. Drinks more than intended: No. Others hurt by drinking: No. Ready to change: No. Household alcohol concerns: No., 02/10/2017  Employment/School  Employed, 09/11/2019  Exercise  Exercise frequency: Daily., 08/13/2015  Home/Environment  Lives with Spouse., 08/13/2015  Nutrition/Health  Regular, 08/13/2015  Sexual  Sexually active: Yes., 08/13/2015  Substance Use  Never, 08/13/2015  Tobacco  Never (less than 100 in lifetime), No, 06/05/2020  Family History  Heart disease.....: Mother.  Hypertension.: Father.  Primary malignant neoplasm of colon: Father.  Immunizations  Vaccine Date Status Comments   influenza virus vaccine, inactivated 10/26/2019  Recorded    zoster vaccine, inactivated 12/13/2018 Recorded    influenza virus vaccine, inactivated 11/20/2018 Recorded    tetanus-diphtheria toxoids 08/24/2018 Given Grif[employer] is the manufacture but was not an option   influenza virus vaccine, inactivated 11/07/2017 Recorded    influenza virus vaccine, inactivated 11/03/2016 Recorded    influenza virus vaccine, inactivated 10/19/2015 Recorded    tetanus/diphtheria/pertussis, acel(Tdap) 11/20/2014 Recorded    zoster vaccine live 11/13/2014 Recorded    influenza virus vaccine, inactivated 11/13/2014 Recorded    influenza virus vaccine, inactivated 11/11/2014 Recorded    zoster vaccine live 08/07/2014 Recorded    influenza virus vaccine, inactivated 11/15/2013 Recorded    influenza virus vaccine, inactivated 10/16/2011 Recorded 2018-11-21: UNKNOWN CAMPAIGNID   influenza virus vaccine, inactivated 10/20/2010 Recorded    Health Maintenance  Health Maintenance  ???Pending?(in the next year)  ??? ??OverDue  ??? ? ? ?Diabetes Screening due??and every?  ??? ? ? ?Hypertension Management-Education due??03/12/20??and every 1??year(s)  ??? ??Due In Future?  ??? ? ? ?ADL Screening not due until??08/06/20??and every 1??year(s)  ??? ? ? ?Alcohol Misuse Screening not due until??01/01/21??and every 1??year(s)  ??? ? ? ?Obesity Screening not due until??01/01/21??and every 1??year(s)  ??? ? ? ?Depression Screening not due until??02/27/21??and every 1??year(s)  ??? ? ? ?Aspirin Therapy for CVD Prevention not due until??02/28/21??and every 1??year(s)  ??? ? ? ?Blood Pressure Screening not due until??04/27/21??and every 1??year(s)  ??? ? ? ?Body Mass Index Check not due until??04/27/21??and every 1??year(s)  ??? ? ? ?Hypertension Management-Blood Pressure not due until??04/27/21??and every 1??year(s)  ??? ? ? ?Hypertension Management-BMP not due until??05/20/21??and every 1??year(s)  ???Satisfied?(in the past 1 year)  ??? ??Satisfied?  ??? ? ? ?ADL Screening on??08/06/19.??Satisfied by  Denise CARDENAS, Jeff LOPEZ.  ??? ? ? ?Alcohol Misuse Screening on??02/28/20.??Satisfied by Felipa Kelly LPN P.  ??? ? ? ?Aspirin Therapy for CVD Prevention on??02/28/20.??Satisfied by Josué Bass MD  ??? ? ? ?Blood Pressure Screening on??06/05/20.??Satisfied by Opal Simeon L. L.  ??? ? ? ?Body Mass Index Check on??06/05/20.??Satisfied by Opal Simeon L. L.  ??? ? ? ?Depression Screening on??02/28/20.??Satisfied by Felipa Kelly LPN P.  ??? ? ? ?Diabetes Screening on??02/20/20.??Satisfied by Sera Byrd.  ??? ? ? ?Hypertension Management-Blood Pressure on??06/05/20.??Satisfied by Opal Simeon L. L.  ??? ? ? ?Influenza Vaccine on??10/26/19.??Satisfied by Amaya Rubin  ??? ? ? ?Lipid Screening on??02/20/20.??Satisfied by Sera Byrd  ??? ? ? ?Obesity Screening on??06/05/20.??Satisfied by Opal Simeon L. L.  ?  Diagnostic Results  Shoulder radiographs show no arthrosis  ?   (05/20/2020 14:12 CDT MRI Ext Upper Joint Left W W/O Cont)  IMPRESSION:  ?  Near circumferential SLAP tear involving superior, posterior,  anterior, and inferior portions of the glenoid labrum. The tear  extends minimally into the bicipital anchor on image 9 of series 5.  ?  Tendinopathy and bursal surface fraying to the distal supraspinatus  tendon without tear. The rotator cuff appears otherwise intact.  ?  Hypertrophic acromioclavicular joint arthropathy. [1]     [1]?MRI Ext Upper Joint Left W W/O Cont; Monserrat BENZ, Rodo Lafleur 05/20/2020 14:12 CDT

## 2022-05-18 ENCOUNTER — IMMUNIZATION (OUTPATIENT)
Dept: PRIMARY CARE CLINIC | Facility: CLINIC | Age: 62
End: 2022-05-18
Payer: COMMERCIAL

## 2022-05-18 DIAGNOSIS — Z23 NEED FOR VACCINATION: Primary | ICD-10-CM

## 2022-05-18 PROCEDURE — 91305 COVID-19, MRNA, LNP-S, PF, 30 MCG/0.3 ML DOSE VACCINE (PFIZER): CPT | Mod: PBBFAC | Performed by: INTERNAL MEDICINE

## 2022-07-19 ENCOUNTER — DOCUMENTATION ONLY (OUTPATIENT)
Dept: INTERNAL MEDICINE | Facility: CLINIC | Age: 62
End: 2022-07-19
Payer: COMMERCIAL

## 2022-07-25 ENCOUNTER — DOCUMENTATION ONLY (OUTPATIENT)
Dept: INTERNAL MEDICINE | Facility: CLINIC | Age: 62
End: 2022-07-25
Payer: COMMERCIAL

## 2022-08-18 ENCOUNTER — TELEPHONE (OUTPATIENT)
Dept: INTERNAL MEDICINE | Facility: CLINIC | Age: 62
End: 2022-08-18
Payer: COMMERCIAL

## 2022-08-18 DIAGNOSIS — E78.5 HYPERLIPIDEMIA, UNSPECIFIED HYPERLIPIDEMIA TYPE: ICD-10-CM

## 2022-08-18 DIAGNOSIS — E03.9 HYPOTHYROIDISM, UNSPECIFIED TYPE: ICD-10-CM

## 2022-08-18 DIAGNOSIS — R73.01 ELEVATED FASTING GLUCOSE: ICD-10-CM

## 2022-08-18 DIAGNOSIS — Z12.5 SCREENING PSA (PROSTATE SPECIFIC ANTIGEN): ICD-10-CM

## 2022-08-18 DIAGNOSIS — I10 HYPERTENSION, UNSPECIFIED TYPE: ICD-10-CM

## 2022-08-18 DIAGNOSIS — Z00.00 WELLNESS EXAMINATION: Primary | ICD-10-CM

## 2022-08-18 NOTE — TELEPHONE ENCOUNTER
----- Message from Cheryl Lejeune sent at 8/18/2022  2:29 PM CDT -----  Regarding: RE: derrick surinder 8/25 1020  Pt informed of OV, labs, and check in protocol.  He verbalized understanding.   ----- Message -----  From: Desiree Coreas LPN  Sent: 8/18/2022   2:06 PM CDT  To: Deidre Lejeune  Subject: derrick cadena 8/25 1020                               Are there any outstanding tasks in chart? No, but needs FASTING labs PRIOR to appt    Is there any documentation of tasks? no    Has the pt seen another physician, been to ER, UCC, or admitted to hospital since last visit?    Has the pt done blood work or imaging since last visit?

## 2022-08-25 ENCOUNTER — DOCUMENTATION ONLY (OUTPATIENT)
Dept: INTERNAL MEDICINE | Facility: CLINIC | Age: 62
End: 2022-08-25

## 2022-08-25 ENCOUNTER — OFFICE VISIT (OUTPATIENT)
Dept: INTERNAL MEDICINE | Facility: CLINIC | Age: 62
End: 2022-08-25
Payer: COMMERCIAL

## 2022-08-25 VITALS
RESPIRATION RATE: 18 BRPM | HEART RATE: 88 BPM | HEIGHT: 77 IN | OXYGEN SATURATION: 96 % | SYSTOLIC BLOOD PRESSURE: 134 MMHG | DIASTOLIC BLOOD PRESSURE: 88 MMHG | WEIGHT: 267 LBS | BODY MASS INDEX: 31.53 KG/M2

## 2022-08-25 DIAGNOSIS — R79.89 LOW TESTOSTERONE IN MALE: Primary | ICD-10-CM

## 2022-08-25 DIAGNOSIS — E78.00 ELEVATED CHOLESTEROL: ICD-10-CM

## 2022-08-25 DIAGNOSIS — I10 PRIMARY HYPERTENSION: ICD-10-CM

## 2022-08-25 DIAGNOSIS — N40.0 BENIGN PROSTATIC HYPERPLASIA, UNSPECIFIED WHETHER LOWER URINARY TRACT SYMPTOMS PRESENT: ICD-10-CM

## 2022-08-25 PROCEDURE — 3008F BODY MASS INDEX DOCD: CPT | Mod: CPTII,,, | Performed by: INTERNAL MEDICINE

## 2022-08-25 PROCEDURE — 4010F ACE/ARB THERAPY RXD/TAKEN: CPT | Mod: CPTII,,, | Performed by: INTERNAL MEDICINE

## 2022-08-25 PROCEDURE — 3075F SYST BP GE 130 - 139MM HG: CPT | Mod: CPTII,,, | Performed by: INTERNAL MEDICINE

## 2022-08-25 PROCEDURE — 3079F PR MOST RECENT DIASTOLIC BLOOD PRESSURE 80-89 MM HG: ICD-10-PCS | Mod: CPTII,,, | Performed by: INTERNAL MEDICINE

## 2022-08-25 PROCEDURE — 1160F PR REVIEW ALL MEDS BY PRESCRIBER/CLIN PHARMACIST DOCUMENTED: ICD-10-PCS | Mod: CPTII,,, | Performed by: INTERNAL MEDICINE

## 2022-08-25 PROCEDURE — 1159F MED LIST DOCD IN RCRD: CPT | Mod: CPTII,,, | Performed by: INTERNAL MEDICINE

## 2022-08-25 PROCEDURE — 99213 PR OFFICE/OUTPT VISIT, EST, LEVL III, 20-29 MIN: ICD-10-PCS | Mod: ,,, | Performed by: INTERNAL MEDICINE

## 2022-08-25 PROCEDURE — 3075F PR MOST RECENT SYSTOLIC BLOOD PRESS GE 130-139MM HG: ICD-10-PCS | Mod: CPTII,,, | Performed by: INTERNAL MEDICINE

## 2022-08-25 PROCEDURE — 1159F PR MEDICATION LIST DOCUMENTED IN MEDICAL RECORD: ICD-10-PCS | Mod: CPTII,,, | Performed by: INTERNAL MEDICINE

## 2022-08-25 PROCEDURE — 4010F PR ACE/ARB THEARPY RXD/TAKEN: ICD-10-PCS | Mod: CPTII,,, | Performed by: INTERNAL MEDICINE

## 2022-08-25 PROCEDURE — 1160F RVW MEDS BY RX/DR IN RCRD: CPT | Mod: CPTII,,, | Performed by: INTERNAL MEDICINE

## 2022-08-25 PROCEDURE — 3079F DIAST BP 80-89 MM HG: CPT | Mod: CPTII,,, | Performed by: INTERNAL MEDICINE

## 2022-08-25 PROCEDURE — 3008F PR BODY MASS INDEX (BMI) DOCUMENTED: ICD-10-PCS | Mod: CPTII,,, | Performed by: INTERNAL MEDICINE

## 2022-08-25 PROCEDURE — 99213 OFFICE O/P EST LOW 20 MIN: CPT | Mod: ,,, | Performed by: INTERNAL MEDICINE

## 2022-08-25 RX ORDER — FINASTERIDE 5 MG/1
5 TABLET, FILM COATED ORAL DAILY
COMMUNITY
Start: 2022-07-16

## 2022-08-25 RX ORDER — TESTOSTERONE CYPIONATE 200 MG/ML
200 INJECTION, SOLUTION INTRAMUSCULAR
COMMUNITY
Start: 2022-01-18 | End: 2022-08-25 | Stop reason: SDUPTHER

## 2022-08-25 RX ORDER — TADALAFIL 20 MG/1
20 TABLET ORAL DAILY
COMMUNITY
Start: 2022-07-22

## 2022-08-25 RX ORDER — LEVOTHYROXINE SODIUM 50 UG/1
50 TABLET ORAL DAILY
COMMUNITY
Start: 2022-04-24 | End: 2022-08-29

## 2022-08-25 RX ORDER — SIMVASTATIN 20 MG/1
20 TABLET, FILM COATED ORAL
COMMUNITY
End: 2022-08-29

## 2022-08-25 RX ORDER — TAMSULOSIN HYDROCHLORIDE 0.4 MG/1
2 CAPSULE ORAL DAILY
COMMUNITY
Start: 2022-07-06

## 2022-08-25 RX ORDER — TESTOSTERONE CYPIONATE 200 MG/ML
200 INJECTION, SOLUTION INTRAMUSCULAR
Qty: 10 ML | Refills: 4 | Status: SHIPPED | OUTPATIENT
Start: 2022-08-25 | End: 2023-05-30 | Stop reason: SDUPTHER

## 2022-08-25 RX ORDER — TELMISARTAN 80 MG/1
TABLET ORAL
COMMUNITY
Start: 2021-12-03 | End: 2023-04-13

## 2022-08-25 NOTE — PROGRESS NOTES
Subjective:       Patient ID: Zachery Cordero is a 62 y.o. male.    Chief Complaint: Follow-up (6mo)    HPI62-year-old male he will follow discussed with her, history hypertension, dyslipidemia, hypothyroidism, low testosterone hormone replacement therapy with elevated PSA that is closely monitored by Dr. Carballo urologist.  all labs discussed with the patient slightly elevated LDL, it was of the blood work all within normal limits patient advised to continue exercise program low-carbohydrate diet and weight loss   Review of Systems    acute complaints denies fevers chills or sweats HEENT denies nausea vomiting diarrhea constipation exudate ATC frequency and nocturia   no chest pain   Objective:      Physical Exam  patient alert oriented x 3   HEENT within normal limits no JVD no bruit   heart regular rhythm no murmurs  lungs clear to auscultation bilaterally no wheezing rales or rhonchi  abdomen obese nontender soft and depressible   extremities no edema  Assessment:       1. Low testosterone in male  - testosterone cypionate (DEPOTESTOTERONE CYPIONATE) 200 mg/mL injection; Inject 1 mL (200 mg total) into the muscle every 14 (fourteen) days.  Dispense: 10 mL; Refill: 4    2. Elevated cholesterol    3. Primary hypertension    4. Benign prostatic hyperplasia, unspecified whether lower urinary tract symptoms present      Plan:         All labs  Discussed   Slightly  Elevated  PSA  Under care of Dr Del Angel      RTC  6 months

## 2022-09-22 ENCOUNTER — HISTORICAL (OUTPATIENT)
Dept: ADMINISTRATIVE | Facility: HOSPITAL | Age: 62
End: 2022-09-22
Payer: COMMERCIAL

## 2022-10-19 ENCOUNTER — IMMUNIZATION (OUTPATIENT)
Dept: FAMILY MEDICINE | Facility: CLINIC | Age: 62
End: 2022-10-19
Payer: COMMERCIAL

## 2022-10-19 DIAGNOSIS — Z23 NEED FOR VACCINATION: Primary | ICD-10-CM

## 2022-10-19 PROCEDURE — 91312 COVID-19, MRNA, LNP-S, BIVALENT BOOSTER, PF, 30 MCG/0.3 ML DOSE: ICD-10-PCS | Mod: S$GLB,,, | Performed by: INTERNAL MEDICINE

## 2022-10-19 PROCEDURE — 0124A COVID-19, MRNA, LNP-S, BIVALENT BOOSTER, PF, 30 MCG/0.3 ML DOSE: ICD-10-PCS | Mod: CV19,S$GLB,, | Performed by: INTERNAL MEDICINE

## 2022-10-19 PROCEDURE — 0124A COVID-19, MRNA, LNP-S, BIVALENT BOOSTER, PF, 30 MCG/0.3 ML DOSE: CPT | Mod: CV19,S$GLB,, | Performed by: INTERNAL MEDICINE

## 2022-10-19 PROCEDURE — 91312 COVID-19, MRNA, LNP-S, BIVALENT BOOSTER, PF, 30 MCG/0.3 ML DOSE: CPT | Mod: S$GLB,,, | Performed by: INTERNAL MEDICINE

## 2023-01-30 ENCOUNTER — DOCUMENTATION ONLY (OUTPATIENT)
Dept: ADMINISTRATIVE | Facility: HOSPITAL | Age: 63
End: 2023-01-30
Payer: COMMERCIAL

## 2023-02-01 ENCOUNTER — TELEPHONE (OUTPATIENT)
Dept: INTERNAL MEDICINE | Facility: CLINIC | Age: 63
End: 2023-02-01
Payer: COMMERCIAL

## 2023-02-01 DIAGNOSIS — R06.6 SPASM OF DIAPHRAGM: Primary | ICD-10-CM

## 2023-02-01 RX ORDER — METOCLOPRAMIDE 10 MG/1
10 TABLET ORAL 2 TIMES DAILY
Qty: 20 TABLET | Refills: 0 | Status: SHIPPED | OUTPATIENT
Start: 2023-02-01 | End: 2023-08-28

## 2023-02-01 NOTE — TELEPHONE ENCOUNTER
"Patient called stating "I have been having the hiccups since Friday of last week, non stop.  Is there something Dr. Bass can prescribe for this".  Please Advise   "

## 2023-02-15 DIAGNOSIS — Z12.5 SCREENING FOR PROSTATE CANCER: ICD-10-CM

## 2023-02-15 DIAGNOSIS — I10 PRIMARY HYPERTENSION: ICD-10-CM

## 2023-02-15 DIAGNOSIS — R79.89 LOW TESTOSTERONE IN MALE: Primary | ICD-10-CM

## 2023-02-15 DIAGNOSIS — E78.5 HYPERLIPIDEMIA, UNSPECIFIED HYPERLIPIDEMIA TYPE: ICD-10-CM

## 2023-02-17 LAB
CHOLEST SERPL-MSCNC: 176 MG/DL (ref 0–200)
HDLC SERPL-MCNC: 49 MG/DL (ref 35–70)
LDLC SERPL CALC-MCNC: 109 MG/DL (ref 0–160)
TRIGL SERPL-MCNC: 89 MG/DL (ref 40–160)

## 2023-02-20 ENCOUNTER — DOCUMENTATION ONLY (OUTPATIENT)
Dept: INTERNAL MEDICINE | Facility: CLINIC | Age: 63
End: 2023-02-20
Payer: COMMERCIAL

## 2023-02-20 ENCOUNTER — TELEPHONE (OUTPATIENT)
Dept: INTERNAL MEDICINE | Facility: CLINIC | Age: 63
End: 2023-02-20
Payer: COMMERCIAL

## 2023-02-20 NOTE — TELEPHONE ENCOUNTER
----- Message from Desiree Coreas LPN sent at 2/20/2023  8:51 AM CST -----  Regarding: PV MARIA ALEJANDRA 2/27 1100  Are there any outstanding tasks in chart? No, but needs FASTING labs PRIOR to appt    Is there any documentation of tasks? no    Has the pt seen another physician, been to ER, UCC, or admitted to hospital since last visit?    Has the pt done blood work or imaging since last visit?    5. PLEASE HAVE PATIENT BRING MEDICATION LIST OR BOTTLES TO EVERY OFFICE VISIT

## 2023-02-27 ENCOUNTER — OFFICE VISIT (OUTPATIENT)
Dept: INTERNAL MEDICINE | Facility: CLINIC | Age: 63
End: 2023-02-27
Payer: COMMERCIAL

## 2023-02-27 VITALS
HEART RATE: 67 BPM | DIASTOLIC BLOOD PRESSURE: 88 MMHG | BODY MASS INDEX: 31.41 KG/M2 | RESPIRATION RATE: 18 BRPM | WEIGHT: 266 LBS | SYSTOLIC BLOOD PRESSURE: 136 MMHG | OXYGEN SATURATION: 96 % | HEIGHT: 77 IN

## 2023-02-27 DIAGNOSIS — R79.89 LOW TESTOSTERONE IN MALE: ICD-10-CM

## 2023-02-27 DIAGNOSIS — R97.20 ELEVATED PSA: ICD-10-CM

## 2023-02-27 DIAGNOSIS — I10 PRIMARY HYPERTENSION: ICD-10-CM

## 2023-02-27 DIAGNOSIS — E06.3 HYPOTHYROIDISM DUE TO HASHIMOTO'S THYROIDITIS: Primary | ICD-10-CM

## 2023-02-27 DIAGNOSIS — E03.8 HYPOTHYROIDISM DUE TO HASHIMOTO'S THYROIDITIS: Primary | ICD-10-CM

## 2023-02-27 PROCEDURE — 3075F SYST BP GE 130 - 139MM HG: CPT | Mod: CPTII,,, | Performed by: INTERNAL MEDICINE

## 2023-02-27 PROCEDURE — 3079F PR MOST RECENT DIASTOLIC BLOOD PRESSURE 80-89 MM HG: ICD-10-PCS | Mod: CPTII,,, | Performed by: INTERNAL MEDICINE

## 2023-02-27 PROCEDURE — 1159F PR MEDICATION LIST DOCUMENTED IN MEDICAL RECORD: ICD-10-PCS | Mod: CPTII,,, | Performed by: INTERNAL MEDICINE

## 2023-02-27 PROCEDURE — 3079F DIAST BP 80-89 MM HG: CPT | Mod: CPTII,,, | Performed by: INTERNAL MEDICINE

## 2023-02-27 PROCEDURE — 1160F PR REVIEW ALL MEDS BY PRESCRIBER/CLIN PHARMACIST DOCUMENTED: ICD-10-PCS | Mod: CPTII,,, | Performed by: INTERNAL MEDICINE

## 2023-02-27 PROCEDURE — 3008F BODY MASS INDEX DOCD: CPT | Mod: CPTII,,, | Performed by: INTERNAL MEDICINE

## 2023-02-27 PROCEDURE — 4010F PR ACE/ARB THEARPY RXD/TAKEN: ICD-10-PCS | Mod: CPTII,,, | Performed by: INTERNAL MEDICINE

## 2023-02-27 PROCEDURE — 3075F PR MOST RECENT SYSTOLIC BLOOD PRESS GE 130-139MM HG: ICD-10-PCS | Mod: CPTII,,, | Performed by: INTERNAL MEDICINE

## 2023-02-27 PROCEDURE — 99213 PR OFFICE/OUTPT VISIT, EST, LEVL III, 20-29 MIN: ICD-10-PCS | Mod: ,,, | Performed by: INTERNAL MEDICINE

## 2023-02-27 PROCEDURE — 4010F ACE/ARB THERAPY RXD/TAKEN: CPT | Mod: CPTII,,, | Performed by: INTERNAL MEDICINE

## 2023-02-27 PROCEDURE — 1160F RVW MEDS BY RX/DR IN RCRD: CPT | Mod: CPTII,,, | Performed by: INTERNAL MEDICINE

## 2023-02-27 PROCEDURE — 1159F MED LIST DOCD IN RCRD: CPT | Mod: CPTII,,, | Performed by: INTERNAL MEDICINE

## 2023-02-27 PROCEDURE — 3008F PR BODY MASS INDEX (BMI) DOCUMENTED: ICD-10-PCS | Mod: CPTII,,, | Performed by: INTERNAL MEDICINE

## 2023-02-27 PROCEDURE — 99213 OFFICE O/P EST LOW 20 MIN: CPT | Mod: ,,, | Performed by: INTERNAL MEDICINE

## 2023-02-27 NOTE — PROGRESS NOTES
Subjective:       Patient ID: Zachery Cordero is a 62 y.o. male.    Chief Complaint: Follow-up (6 mo-discuss labs (printout))    HPI  62-year-old male with history hypertension, low testosterone hormone replacement therapy, BPH with elevated PSA in the past secondary to infection after treated he has been closely monitored by Dr. Scott Del Angel.  PSA at his office last week was slightly over for now a 5.17 with no symptoms related to nocturia, decreased urinary stream etcetera.   Already 2 biopsies, so most likely a BPH with elevated PSA but no cancers   If anything most likely related to testosterone replacement therapy.  Labs discussed with the patient hemoglobin at 18 with a hematocrit of 57, patient advised for therapeutic phlebotomy.  He refers every time he goes if he is pauses over 100 they canceled the therapeutic procedure.  That said probably will try some relaxation exercises music and seat down weight before he walks then in order to proceed with the phlebotomy.  Cholesterol is 176 HDL 49  TSH 2.70 testosterone 264  Review of Systems    Essentially negative no chest pain or shortness of breath no recent falls   Denies fever chills or flu-like symptoms.  Again no symptoms related to the genitourinary tract with no nocturia, decreased urinary flow hematuria  The rest of the review of systems essentially negative  Objective:      Physical Exam  patient alert oriented x3   HEENT within normal limits  Heart regular rhythm  Lungs are clear bilateral no wheezing rales or rhonchi   Abdomen benign nontender   Extremities no edema  Assessment:       1. Hypothyroidism due to Hashimoto's thyroiditis    2. Low testosterone in male    3. Primary hypertension    4. Elevated PSA      Plan:        TSH  normal  continue  same  strenght   Recommend therapeutic  phlebotomy   CPMM and  Rx     Keep appoint Dr Del Angel  biopsy neg x 2   RTC  6 months

## 2023-05-30 DIAGNOSIS — R79.89 LOW TESTOSTERONE IN MALE: ICD-10-CM

## 2023-05-30 RX ORDER — TESTOSTERONE CYPIONATE 200 MG/ML
200 INJECTION, SOLUTION INTRAMUSCULAR
Qty: 10 ML | Refills: 4 | Status: SHIPPED | OUTPATIENT
Start: 2023-05-30 | End: 2023-06-01 | Stop reason: SDUPTHER

## 2023-05-30 NOTE — TELEPHONE ENCOUNTER
----- Message from Donavan Estrada sent at 5/30/2023 11:46 AM CDT -----  .Type:  RX Refill Request    Who Called: pt  Refill or New Rx:refill  RX Name and Strength:testosterone cypionate (DEPOTESTOTERONE CYPIONATE) 200 mg/mL injection  How is the patient currently taking it? (ex. 1XDay):Inject 1 mL (200 mg total) into the muscle every 14 (fourteen) days.  Is this a 30 day or 90 day RX:10 inject   Preferred Pharmacy with phone number: [id number] - [place], [place] - [address] AT [facility] [address]  Local or Mail Order:local   Ordering Provider: Kali  Would the patient rather a call back or a response via [facility]? Call back   Best Call Back Number:[phone]  Additional Information:

## 2023-06-01 DIAGNOSIS — R79.89 LOW TESTOSTERONE IN MALE: ICD-10-CM

## 2023-06-01 RX ORDER — TESTOSTERONE CYPIONATE 200 MG/ML
200 INJECTION, SOLUTION INTRAMUSCULAR
Qty: 10 ML | Refills: 4 | Status: SHIPPED | OUTPATIENT
Start: 2023-06-01

## 2023-06-01 NOTE — TELEPHONE ENCOUNTER
"Dr. Rodriguez attempted to send Testosterone Rx on 05/30/23 to pharmacy but it is in "Printed Status".    Meds Re Proposed, set to Normal Status   Can you please resend to pharmacy   "

## 2023-06-27 ENCOUNTER — DOCUMENTATION ONLY (OUTPATIENT)
Dept: INTERNAL MEDICINE | Facility: CLINIC | Age: 63
End: 2023-06-27
Payer: COMMERCIAL

## 2023-06-27 LAB — CRC RECOMMENDATION EXT: NORMAL

## 2023-07-26 ENCOUNTER — TELEPHONE (OUTPATIENT)
Dept: INTERNAL MEDICINE | Facility: CLINIC | Age: 63
End: 2023-07-26
Payer: COMMERCIAL

## 2023-07-26 NOTE — TELEPHONE ENCOUNTER
----- Message from Suzanne Butler sent at 7/26/2023 11:08 AM CDT -----  .Type:  Needs Medical Advice    Who Called: pt  Symptoms (please be specific):    How long has patient had these symptoms:    Pharmacy name and phone #:    Would the patient rather a call back or a response via [facility]?   Best Call Back Number: [phone]  Additional Information: pt having eye surgery and paperwork was fax to clinic pt asking do he need appt to be clear please advice

## 2023-07-26 NOTE — TELEPHONE ENCOUNTER
Clearance Not Received, Message does not indicate whom patient is having surgery with.    Spoke to patient [facility]--Eye Surgery.  Clearance has not been received, has been requested

## 2023-08-21 ENCOUNTER — TELEPHONE (OUTPATIENT)
Dept: INTERNAL MEDICINE | Facility: CLINIC | Age: 63
End: 2023-08-21
Payer: COMMERCIAL

## 2023-08-21 NOTE — TELEPHONE ENCOUNTER
----- Message from Vanessa Oviedo sent at 8/21/2023  9:21 AM CDT -----  Type:  Needs Medical Advice    Who Called: pt    Would the patient rather a call back or a response via [facility]?   Best Call Back Number: [phone]  Additional Information: pt called about his lab orders. Wanted to come in after lunch to pick them up. Called b/l & no ans. Please contact

## 2023-08-22 ENCOUNTER — TELEPHONE (OUTPATIENT)
Dept: INTERNAL MEDICINE | Facility: CLINIC | Age: 63
End: 2023-08-22
Payer: COMMERCIAL

## 2023-08-22 LAB
CHOLEST SERPL-MSCNC: 141 MG/DL (ref 0–200)
HDLC SERPL-MCNC: 51 MG/DL (ref 35–70)
LDLC SERPL CALC-MCNC: 76 MG/DL (ref 0–160)
TRIGL SERPL-MCNC: 59 MG/DL (ref 40–160)

## 2023-08-22 NOTE — TELEPHONE ENCOUNTER
"----- Message from Kathy Heath MA sent at 8/21/2023 12:55 PM CDT -----  Regarding: DR. BESS PV 8/28/2023 10:40  CHANGE TO WELLNESS  Are there any outstanding tasks in chart? No, but needs FASTING labs, TO BE DONE AT  "[facility]" or lab location of choice PRIOR to appt     Is there any documentation of tasks? no     Has the pt seen another physician, been to ER, UCC, or admitted to hospital since last visit?     Has the pt done blood work or imaging since last visit?    PLEASE BRING MEDS OR LIST OF MEDS      "

## 2023-08-28 ENCOUNTER — OFFICE VISIT (OUTPATIENT)
Dept: INTERNAL MEDICINE | Facility: CLINIC | Age: 63
End: 2023-08-28
Payer: COMMERCIAL

## 2023-08-28 ENCOUNTER — DOCUMENTATION ONLY (OUTPATIENT)
Dept: INTERNAL MEDICINE | Facility: CLINIC | Age: 63
End: 2023-08-28

## 2023-08-28 VITALS
WEIGHT: 262 LBS | HEART RATE: 92 BPM | DIASTOLIC BLOOD PRESSURE: 70 MMHG | BODY MASS INDEX: 30.94 KG/M2 | OXYGEN SATURATION: 97 % | SYSTOLIC BLOOD PRESSURE: 110 MMHG | HEIGHT: 77 IN

## 2023-08-28 DIAGNOSIS — R79.89 LOW TESTOSTERONE IN MALE: ICD-10-CM

## 2023-08-28 DIAGNOSIS — I10 PRIMARY HYPERTENSION: Primary | ICD-10-CM

## 2023-08-28 DIAGNOSIS — E06.3 HYPOTHYROIDISM DUE TO HASHIMOTO'S THYROIDITIS: ICD-10-CM

## 2023-08-28 DIAGNOSIS — E03.8 HYPOTHYROIDISM DUE TO HASHIMOTO'S THYROIDITIS: ICD-10-CM

## 2023-08-28 PROCEDURE — 3074F PR MOST RECENT SYSTOLIC BLOOD PRESSURE < 130 MM HG: ICD-10-PCS | Mod: CPTII,,, | Performed by: INTERNAL MEDICINE

## 2023-08-28 PROCEDURE — 1160F RVW MEDS BY RX/DR IN RCRD: CPT | Mod: CPTII,,, | Performed by: INTERNAL MEDICINE

## 2023-08-28 PROCEDURE — 4010F ACE/ARB THERAPY RXD/TAKEN: CPT | Mod: CPTII,,, | Performed by: INTERNAL MEDICINE

## 2023-08-28 PROCEDURE — 3078F DIAST BP <80 MM HG: CPT | Mod: CPTII,,, | Performed by: INTERNAL MEDICINE

## 2023-08-28 PROCEDURE — 1101F PR PT FALLS ASSESS DOC 0-1 FALLS W/OUT INJ PAST YR: ICD-10-PCS | Mod: CPTII,,, | Performed by: INTERNAL MEDICINE

## 2023-08-28 PROCEDURE — 1159F MED LIST DOCD IN RCRD: CPT | Mod: CPTII,,, | Performed by: INTERNAL MEDICINE

## 2023-08-28 PROCEDURE — 1159F PR MEDICATION LIST DOCUMENTED IN MEDICAL RECORD: ICD-10-PCS | Mod: CPTII,,, | Performed by: INTERNAL MEDICINE

## 2023-08-28 PROCEDURE — 3008F PR BODY MASS INDEX (BMI) DOCUMENTED: ICD-10-PCS | Mod: CPTII,,, | Performed by: INTERNAL MEDICINE

## 2023-08-28 PROCEDURE — 1101F PT FALLS ASSESS-DOCD LE1/YR: CPT | Mod: CPTII,,, | Performed by: INTERNAL MEDICINE

## 2023-08-28 PROCEDURE — 99213 PR OFFICE/OUTPT VISIT, EST, LEVL III, 20-29 MIN: ICD-10-PCS | Mod: ,,, | Performed by: INTERNAL MEDICINE

## 2023-08-28 PROCEDURE — 3078F PR MOST RECENT DIASTOLIC BLOOD PRESSURE < 80 MM HG: ICD-10-PCS | Mod: CPTII,,, | Performed by: INTERNAL MEDICINE

## 2023-08-28 PROCEDURE — 3008F BODY MASS INDEX DOCD: CPT | Mod: CPTII,,, | Performed by: INTERNAL MEDICINE

## 2023-08-28 PROCEDURE — 99213 OFFICE O/P EST LOW 20 MIN: CPT | Mod: ,,, | Performed by: INTERNAL MEDICINE

## 2023-08-28 PROCEDURE — 3288F PR FALLS RISK ASSESSMENT DOCUMENTED: ICD-10-PCS | Mod: CPTII,,, | Performed by: INTERNAL MEDICINE

## 2023-08-28 PROCEDURE — 3288F FALL RISK ASSESSMENT DOCD: CPT | Mod: CPTII,,, | Performed by: INTERNAL MEDICINE

## 2023-08-28 PROCEDURE — 1160F PR REVIEW ALL MEDS BY PRESCRIBER/CLIN PHARMACIST DOCUMENTED: ICD-10-PCS | Mod: CPTII,,, | Performed by: INTERNAL MEDICINE

## 2023-08-28 PROCEDURE — 4010F PR ACE/ARB THEARPY RXD/TAKEN: ICD-10-PCS | Mod: CPTII,,, | Performed by: INTERNAL MEDICINE

## 2023-08-28 PROCEDURE — 3074F SYST BP LT 130 MM HG: CPT | Mod: CPTII,,, | Performed by: INTERNAL MEDICINE

## 2023-08-28 NOTE — PROGRESS NOTES
Subjective:       Patient ID: Zachery Cordero is a 63 y.o. male.    Chief Complaint: Follow-up (6 month)     HPI   63-year-old male hypertension, BPH, low testosterone he will follow-up history hypothyroidism as well with no acute complaints doing well     Review of Systems    Essentially negative no fever chills or flu-like symptoms denies nausea vomiting diarrhea constipation   No chest pain at rest or exertion no shortness of breath  The rest of the review of systems essentially negative  Objective:      Physical Exam    alert O x 3    HEENT  WNL   HEART R1R2   LUNGS  CTA bilaterally   ABDS  soft and  depresible   EXT  no cce  Assessment:       1. Primary hypertension    2. Hypothyroidism due to Hashimoto's thyroiditis    3. Low testosterone in male      Plan:       1.  Stable  well controlled   2.Stable   3.CPMM and  Rx    RTC  6 months

## 2023-08-29 ENCOUNTER — DOCUMENTATION ONLY (OUTPATIENT)
Dept: INTERNAL MEDICINE | Facility: CLINIC | Age: 63
End: 2023-08-29
Payer: COMMERCIAL

## 2023-08-29 DIAGNOSIS — E03.8 HYPOTHYROIDISM DUE TO HASHIMOTO'S THYROIDITIS: Primary | ICD-10-CM

## 2023-08-29 DIAGNOSIS — E06.3 HYPOTHYROIDISM DUE TO HASHIMOTO'S THYROIDITIS: Primary | ICD-10-CM

## 2023-08-29 RX ORDER — LEVOTHYROXINE SODIUM 50 UG/1
TABLET ORAL
Qty: 90 TABLET | Refills: 3 | Status: SHIPPED | OUTPATIENT
Start: 2023-08-29

## 2023-08-30 DIAGNOSIS — E78.2 MIXED HYPERLIPIDEMIA: Primary | ICD-10-CM

## 2023-08-30 RX ORDER — SIMVASTATIN 20 MG/1
TABLET, FILM COATED ORAL
Qty: 90 TABLET | Refills: 3 | Status: SHIPPED | OUTPATIENT
Start: 2023-08-30

## 2023-09-21 ENCOUNTER — TELEPHONE (OUTPATIENT)
Dept: INTERNAL MEDICINE | Facility: CLINIC | Age: 63
End: 2023-09-21
Payer: COMMERCIAL

## 2023-09-21 NOTE — TELEPHONE ENCOUNTER
----- Message from Patricia Akhtar sent at 9/21/2023 11:27 AM CDT -----  Regarding: Patient Call  .Type:  Patient Returning Call    Who Called:pt  Who Left Message for Patient:Jean   Does the patient know what this is regarding?:decline   Would the patient rather a call back or a response via [facility]?   Best Call Back Number:[phone]  Additional Information: decline to say reason of call

## 2023-09-21 NOTE — TELEPHONE ENCOUNTER
Spoke to  Zachery, Referral was sent for his wife to Dr. Gorman's office and he had questions.    Addressed scheduling concerns, he verbalized understanding

## 2023-11-13 ENCOUNTER — OFFICE VISIT (OUTPATIENT)
Dept: ORTHOPEDICS | Facility: CLINIC | Age: 63
End: 2023-11-13
Payer: COMMERCIAL

## 2023-11-13 ENCOUNTER — HOSPITAL ENCOUNTER (OUTPATIENT)
Dept: RADIOLOGY | Facility: CLINIC | Age: 63
Discharge: HOME | End: 2023-11-13
Attending: NURSE PRACTITIONER
Payer: COMMERCIAL

## 2023-11-13 VITALS
DIASTOLIC BLOOD PRESSURE: 99 MMHG | BODY MASS INDEX: 30.94 KG/M2 | HEART RATE: 88 BPM | HEIGHT: 77 IN | WEIGHT: 262 LBS | SYSTOLIC BLOOD PRESSURE: 139 MMHG

## 2023-11-13 DIAGNOSIS — M17.12 PRIMARY OSTEOARTHRITIS OF LEFT KNEE: Primary | ICD-10-CM

## 2023-11-13 DIAGNOSIS — M25.562 LEFT KNEE PAIN, UNSPECIFIED CHRONICITY: ICD-10-CM

## 2023-11-13 PROCEDURE — 20610 DRAIN/INJ JOINT/BURSA W/O US: CPT | Mod: LT,,, | Performed by: NURSE PRACTITIONER

## 2023-11-13 PROCEDURE — 99204 PR OFFICE/OUTPT VISIT, NEW, LEVL IV, 45-59 MIN: ICD-10-PCS | Mod: 25,,, | Performed by: NURSE PRACTITIONER

## 2023-11-13 PROCEDURE — 4010F PR ACE/ARB THEARPY RXD/TAKEN: ICD-10-PCS | Mod: CPTII,,, | Performed by: NURSE PRACTITIONER

## 2023-11-13 PROCEDURE — 1101F PR PT FALLS ASSESS DOC 0-1 FALLS W/OUT INJ PAST YR: ICD-10-PCS | Mod: CPTII,,, | Performed by: NURSE PRACTITIONER

## 2023-11-13 PROCEDURE — 3288F PR FALLS RISK ASSESSMENT DOCUMENTED: ICD-10-PCS | Mod: CPTII,,, | Performed by: NURSE PRACTITIONER

## 2023-11-13 PROCEDURE — 1159F PR MEDICATION LIST DOCUMENTED IN MEDICAL RECORD: ICD-10-PCS | Mod: CPTII,,, | Performed by: NURSE PRACTITIONER

## 2023-11-13 PROCEDURE — 3080F PR MOST RECENT DIASTOLIC BLOOD PRESSURE >= 90 MM HG: ICD-10-PCS | Mod: CPTII,,, | Performed by: NURSE PRACTITIONER

## 2023-11-13 PROCEDURE — 73564 XR KNEE COMP 4 OR MORE VIEWS LEFT: ICD-10-PCS | Mod: LT,,, | Performed by: NURSE PRACTITIONER

## 2023-11-13 PROCEDURE — 1101F PT FALLS ASSESS-DOCD LE1/YR: CPT | Mod: CPTII,,, | Performed by: NURSE PRACTITIONER

## 2023-11-13 PROCEDURE — 73564 X-RAY EXAM KNEE 4 OR MORE: CPT | Mod: LT,,, | Performed by: NURSE PRACTITIONER

## 2023-11-13 PROCEDURE — 3075F SYST BP GE 130 - 139MM HG: CPT | Mod: CPTII,,, | Performed by: NURSE PRACTITIONER

## 2023-11-13 PROCEDURE — 3080F DIAST BP >= 90 MM HG: CPT | Mod: CPTII,,, | Performed by: NURSE PRACTITIONER

## 2023-11-13 PROCEDURE — 3288F FALL RISK ASSESSMENT DOCD: CPT | Mod: CPTII,,, | Performed by: NURSE PRACTITIONER

## 2023-11-13 PROCEDURE — 3075F PR MOST RECENT SYSTOLIC BLOOD PRESS GE 130-139MM HG: ICD-10-PCS | Mod: CPTII,,, | Performed by: NURSE PRACTITIONER

## 2023-11-13 PROCEDURE — 3008F PR BODY MASS INDEX (BMI) DOCUMENTED: ICD-10-PCS | Mod: CPTII,,, | Performed by: NURSE PRACTITIONER

## 2023-11-13 PROCEDURE — 3008F BODY MASS INDEX DOCD: CPT | Mod: CPTII,,, | Performed by: NURSE PRACTITIONER

## 2023-11-13 PROCEDURE — 4010F ACE/ARB THERAPY RXD/TAKEN: CPT | Mod: CPTII,,, | Performed by: NURSE PRACTITIONER

## 2023-11-13 PROCEDURE — 1159F MED LIST DOCD IN RCRD: CPT | Mod: CPTII,,, | Performed by: NURSE PRACTITIONER

## 2023-11-13 PROCEDURE — 99204 OFFICE O/P NEW MOD 45 MIN: CPT | Mod: 25,,, | Performed by: NURSE PRACTITIONER

## 2023-11-13 PROCEDURE — 20610 LARGE JOINT ASPIRATION/INJECTION: L KNEE: ICD-10-PCS | Mod: LT,,, | Performed by: NURSE PRACTITIONER

## 2023-11-13 RX ORDER — LIDOCAINE HYDROCHLORIDE 10 MG/ML
3 INJECTION INFILTRATION; PERINEURAL
Status: DISCONTINUED | OUTPATIENT
Start: 2023-11-13 | End: 2023-11-13 | Stop reason: HOSPADM

## 2023-11-13 RX ORDER — MELOXICAM 15 MG/1
15 TABLET ORAL DAILY
Qty: 14 TABLET | Refills: 2 | Status: SHIPPED | OUTPATIENT
Start: 2023-11-13 | End: 2023-12-20

## 2023-11-13 RX ORDER — BETAMETHASONE SODIUM PHOSPHATE AND BETAMETHASONE ACETATE 3; 3 MG/ML; MG/ML
6 INJECTION, SUSPENSION INTRA-ARTICULAR; INTRALESIONAL; INTRAMUSCULAR; SOFT TISSUE
Status: DISCONTINUED | OUTPATIENT
Start: 2023-11-13 | End: 2023-11-13 | Stop reason: HOSPADM

## 2023-11-13 RX ADMIN — BETAMETHASONE SODIUM PHOSPHATE AND BETAMETHASONE ACETATE 6 MG: 3; 3 INJECTION, SUSPENSION INTRA-ARTICULAR; INTRALESIONAL; INTRAMUSCULAR; SOFT TISSUE at 09:11

## 2023-11-13 RX ADMIN — LIDOCAINE HYDROCHLORIDE 3 ML: 10 INJECTION INFILTRATION; PERINEURAL at 09:11

## 2023-11-13 NOTE — PROCEDURES
Large Joint Aspiration/Injection: L knee    Date/Time: 11/13/2023 9:45 AM    Performed by: Kriti Espinal FNP  Authorized by: Kirti Espinal FNP    Consent Done?:  Yes (Verbal)  Indications:  Pain and arthritis  Site marked: the procedure site was marked    Timeout: prior to procedure the correct patient, procedure, and site was verified    Prep: patient was prepped and draped in usual sterile fashion      Local anesthesia used?: Yes    Local anesthetic:  Topical anesthetic    Details:  Needle Size:  22 G  Approach:  Anterolateral  Location:  Knee  Site:  L knee  Medications:  3 mL LIDOcaine HCL 10 mg/ml (1%) 10 mg/mL (1 %); 6 mg betamethasone acetate-betamethasone sodium phosphate 6 mg/mL  Patient tolerance:  Patient tolerated the procedure well with no immediate complications

## 2023-11-13 NOTE — PROGRESS NOTES
Chief Complaint:   Chief Complaint   Patient presents with    Left Knee - Pain    Pain     New patient here with left knee pain and swelling started about 1 month ago. No injury, woke up one morning and pain since. Wearing brace for stability.    mn       Consulting Physician: No ref. provider found    History of present illness:    he  is a pleasant 63 y.o. year old male  who presents with left knee pain since October 2023. He denies injury. He woke up with pain and swelling. His pain is global in the knee. It is worse in the morning and with increased activity. It is somewhat better with rest. He has been wearing a knee brace without much relief. He is taking OTC Ibuprofen without much relief. He is s/p right knee PLM in 2019 with good results.     Past Medical History:   Diagnosis Date    HLD (hyperlipidemia)     HTN (hypertension)     Hypothyroidism, unspecified     Personal history of colonic polyps 06/20/2018    Colonoscopy Report    Unspecified sensorineural hearing loss        Past Surgical History:   Procedure Laterality Date    COLONOSCOPY  06/20/2018    COLONOSCOPY W/ POLYPECTOMY  06/27/2023    KNEE SURGERY         Current Outpatient Medications   Medication Sig    finasteride (PROSCAR) 5 mg tablet Take 5 mg by mouth once daily.    levothyroxine (SYNTHROID) 50 MCG tablet TAKE 1 TABLET BY MOUTH DAILY    simvastatin (ZOCOR) 20 MG tablet TAKE 1 TABLET BY MOUTH EVERY DAY AT BEDTIME    tadalafiL (CIALIS) 20 MG Tab Take 20 mg by mouth once daily.    tamsulosin (FLOMAX) 0.4 mg Cap Take 2 capsules by mouth once daily.    telmisartan (MICARDIS) 80 MG Tab TAKE 1 TABLET BY MOUTH DAILY    testosterone cypionate (DEPOTESTOTERONE CYPIONATE) 200 mg/mL injection Inject 1 mL (200 mg total) into the muscle every 14 (fourteen) days.    meloxicam (MOBIC) 15 MG tablet Take 1 tablet (15 mg total) by mouth once daily.     No current facility-administered medications for this visit.       Review of patient's allergies  "indicates:  No Known Allergies    Family History   Family history unknown: Yes       Social History     Socioeconomic History    Marital status:    Tobacco Use    Smoking status: Never    Smokeless tobacco: Never   Substance and Sexual Activity    Alcohol use: Yes    Drug use: Never    Sexual activity: Yes       Review of Systems:    Constitution:   Denies chills, fever, and sweats.  HENT:   Denies headaches or blurry vision.  Cardiovascular:  Denies chest pain or irregular heart beat.  Respiratory:   Denies cough or shortness of breath.  Gastrointestinal:  Denies abdominal pain, nausea, or vomiting.  Musculoskeletal:   Denies muscle cramps.  Neurological:   Denies dizziness or focal weakness.  Psychiatric/Behavior: Normal mental status.  Hematology/Lymph:  Denies bleeding problem or easy bruising/bleeding.  Skin:    Denies rash or suspicious lesions.    Examination:    Vital Signs:    Vitals:    11/13/23 0947 11/13/23 0948   BP: (!) 139/99    Pulse: 88    Weight: 118.8 kg (262 lb)    Height: 6' 5.01" (1.956 m)    PainSc:    5       Body mass index is 31.06 kg/m².    Constitution:   Well-developed, well nourished patient in no acute distress.  Neurological:   Alert and oriented x 3 and cooperative to examination.     Psychiatric/Behavior: Normal mental status.  Respiratory:   No shortness of breath.  Cards:    Pulses palpable and symmetric, brisk cap refill   Eyes:    Extraoccular muscles intact  Skin:    No scars, rash or suspicious lesions.    MSK:   Standing exam  stance: normal alignment, no significant leg-length discrepancy  gait: antalgic limp     Knee examination  - General comments: unremarkable appearance    - Tenderness: global     Knee                  RIGHT    LEFT  Skin:                  Intact      Intact  ROM:                 0-130      0-130  Effusion:             Neg         +  MJL TTP:           Neg         Neg  LJL TTP:            Neg         Neg  David:         Neg         Neg  Pat " crep:            Neg         +  Patella TTPs:     Neg         Neg  Patella grind:      Neg        Neg  Lachman:           Neg        Neg  Pivot shift:          Neg        Neg  Valgus stress:    Neg        Neg  Varus stress:      Neg        Neg  Posterior drawer: Neg       Neg    N-V intact intact  Hip: nml nml    Lower extremity edema:Negative       Imaging: X-rays ordered and images interpreted today personally by me of 4 views of the left knee show osteoarthritis        Assessment: Primary osteoarthritis of left knee  -     X-Ray Knee Complete 4 or More Views Left; Future; Expected date: 11/13/2023    Other orders  -     meloxicam (MOBIC) 15 MG tablet; Take 1 tablet (15 mg total) by mouth once daily.  Dispense: 14 tablet; Refill: 2        Plan:  We discussed treatment options - antiinflammatories, steroid injection, visco supplementation injections, activity modification, and eventual surgery. He would like to try a steroid injection in the left knee today. Mobic sent to his pharmacy. We will show him a knee brace today. He is interested in a knee replacement in May 2024. Follow up in April 2024 for potential pre-op.

## 2023-12-20 DIAGNOSIS — M17.12 PRIMARY OSTEOARTHRITIS OF LEFT KNEE: Primary | ICD-10-CM

## 2023-12-20 RX ORDER — MELOXICAM 15 MG/1
15 TABLET ORAL
Qty: 14 TABLET | Refills: 2 | Status: SHIPPED | OUTPATIENT
Start: 2023-12-20 | End: 2024-02-09

## 2024-02-09 DIAGNOSIS — M17.12 PRIMARY OSTEOARTHRITIS OF LEFT KNEE: ICD-10-CM

## 2024-02-09 RX ORDER — MELOXICAM 15 MG/1
15 TABLET ORAL
Qty: 14 TABLET | Refills: 2 | Status: SHIPPED | OUTPATIENT
Start: 2024-02-09 | End: 2024-03-20

## 2024-02-20 ENCOUNTER — TELEPHONE (OUTPATIENT)
Dept: INTERNAL MEDICINE | Facility: CLINIC | Age: 64
End: 2024-02-20
Payer: COMMERCIAL

## 2024-02-20 DIAGNOSIS — E78.5 HYPERLIPIDEMIA, UNSPECIFIED HYPERLIPIDEMIA TYPE: ICD-10-CM

## 2024-02-20 DIAGNOSIS — Z12.5 SCREENING PSA (PROSTATE SPECIFIC ANTIGEN): Primary | ICD-10-CM

## 2024-02-20 DIAGNOSIS — E03.9 HYPOTHYROIDISM, UNSPECIFIED TYPE: ICD-10-CM

## 2024-02-20 DIAGNOSIS — R79.89 LOW TESTOSTERONE IN MALE: ICD-10-CM

## 2024-02-20 DIAGNOSIS — I10 PRIMARY HYPERTENSION: ICD-10-CM

## 2024-02-20 DIAGNOSIS — Z12.5 SCREENING FOR PROSTATE CANCER: ICD-10-CM

## 2024-02-20 NOTE — TELEPHONE ENCOUNTER
----- Message from [facility] sent at 2/20/2024 10:28 AM CST -----  .Type:  Needs Medical Advice    Who Called:  pt    Symptoms (please be specific):  no     How long has patient had these symptoms:   no    Pharmacy name and phone #:   no    Would the patient rather a call back or a response via [facility]?      Best Call Back Number:  [phone]    Additional Information:  pt wants to come and  a hard copy  today of his blood work so he can get it done at another facility please advise thanks

## 2024-02-21 ENCOUNTER — TELEPHONE (OUTPATIENT)
Dept: INTERNAL MEDICINE | Facility: CLINIC | Age: 64
End: 2024-02-21
Payer: COMMERCIAL

## 2024-02-21 NOTE — TELEPHONE ENCOUNTER
"----- Message from Elvira Wu LPN sent at 2/20/2024  7:45 AM CST -----  Regarding: Dr. Bass 02/28/2024 Wellness 1040  Are there any outstanding tasks in chart? No, but needs FASTING labs, TO BE DONE AT  "[facility]" or lab location of choice PRIOR to appt    Is there any documentation of tasks? no    Has the pt seen another physician, been to ER, [facility], or admitted to hospital since last visit?    Has the pt done blood work or imaging since last visit?    5. PLEASE HAVE PATIENT BRING MEDICATION LIST OR BOTTLES TO EVERY OFFICE VISIT      "

## 2024-02-22 LAB
CHOLEST SERPL-MSCNC: 159 MG/DL (ref 0–200)
HDLC SERPL-MCNC: 45 MG/DL (ref 35–70)
LDLC SERPL CALC-MCNC: 98 MG/DL (ref 0–160)
TRIGL SERPL-MCNC: 70 MG/DL (ref 40–160)

## 2024-02-26 ENCOUNTER — DOCUMENTATION ONLY (OUTPATIENT)
Dept: INTERNAL MEDICINE | Facility: CLINIC | Age: 64
End: 2024-02-26
Payer: COMMERCIAL

## 2024-02-29 ENCOUNTER — OFFICE VISIT (OUTPATIENT)
Dept: INTERNAL MEDICINE | Facility: CLINIC | Age: 64
End: 2024-02-29
Payer: COMMERCIAL

## 2024-02-29 VITALS
WEIGHT: 269 LBS | DIASTOLIC BLOOD PRESSURE: 86 MMHG | HEART RATE: 103 BPM | OXYGEN SATURATION: 96 % | BODY MASS INDEX: 31.76 KG/M2 | HEIGHT: 77 IN | SYSTOLIC BLOOD PRESSURE: 136 MMHG

## 2024-02-29 DIAGNOSIS — Z00.00 ANNUAL PHYSICAL EXAM: ICD-10-CM

## 2024-02-29 DIAGNOSIS — I73.9 PERIPHERAL VASCULAR DISEASE, UNSPECIFIED: Primary | ICD-10-CM

## 2024-02-29 DIAGNOSIS — I10 PRIMARY HYPERTENSION: ICD-10-CM

## 2024-02-29 PROCEDURE — 3075F SYST BP GE 130 - 139MM HG: CPT | Mod: CPTII,,, | Performed by: INTERNAL MEDICINE

## 2024-02-29 PROCEDURE — 3079F DIAST BP 80-89 MM HG: CPT | Mod: CPTII,,, | Performed by: INTERNAL MEDICINE

## 2024-02-29 PROCEDURE — 1101F PT FALLS ASSESS-DOCD LE1/YR: CPT | Mod: CPTII,,, | Performed by: INTERNAL MEDICINE

## 2024-02-29 PROCEDURE — 1159F MED LIST DOCD IN RCRD: CPT | Mod: CPTII,,, | Performed by: INTERNAL MEDICINE

## 2024-02-29 PROCEDURE — 1125F AMNT PAIN NOTED PAIN PRSNT: CPT | Mod: CPTII,,, | Performed by: INTERNAL MEDICINE

## 2024-02-29 PROCEDURE — 4010F ACE/ARB THERAPY RXD/TAKEN: CPT | Mod: CPTII,,, | Performed by: INTERNAL MEDICINE

## 2024-02-29 PROCEDURE — 3008F BODY MASS INDEX DOCD: CPT | Mod: CPTII,,, | Performed by: INTERNAL MEDICINE

## 2024-02-29 PROCEDURE — 3288F FALL RISK ASSESSMENT DOCD: CPT | Mod: CPTII,,, | Performed by: INTERNAL MEDICINE

## 2024-02-29 PROCEDURE — 1160F RVW MEDS BY RX/DR IN RCRD: CPT | Mod: CPTII,,, | Performed by: INTERNAL MEDICINE

## 2024-02-29 PROCEDURE — 99397 PER PM REEVAL EST PAT 65+ YR: CPT | Mod: ,,, | Performed by: INTERNAL MEDICINE

## 2024-02-29 NOTE — PROGRESS NOTES
Patient ID: Zachery Cordero is a 63 y.o. male.    Chief Complaint: Annual Exam (Wellness- no complaints or concerns)      HPI:   Patient presents here today for above reason.       The patient's Health Maintenance was reviewed and the following appears to be due at this time:   Health Maintenance Due   Topic Date Due    Hepatitis C Screening  Never done    HIV Screening  Never done    RSV Vaccine (Age 60+ and Pregnant patients) (1 - 1-dose 60+ series) Never done    Pneumococcal Vaccines (Age 65+) (2 of 2 - PCV) 05/12/2023    Hemoglobin A1c (Prediabetes)  08/19/2023    Influenza Vaccine (1) 09/01/2023    COVID-19 Vaccine (6 - 2023-24 season) 09/01/2023        Past Medical History:  Past Medical History:   Diagnosis Date    HLD (hyperlipidemia)     HTN (hypertension)     Hypothyroidism, unspecified     Personal history of colonic polyps 06/20/2018    Colonoscopy Report    Unspecified sensorineural hearing loss      Past Surgical History:   Procedure Laterality Date    COLONOSCOPY  06/20/2018    COLONOSCOPY W/ POLYPECTOMY  06/27/2023    KNEE SURGERY       Review of patient's allergies indicates:  No Known Allergies  Current Outpatient Medications on File Prior to Visit   Medication Sig Dispense Refill    finasteride (PROSCAR) 5 mg tablet Take 5 mg by mouth once daily.      levothyroxine (SYNTHROID) 50 MCG tablet TAKE 1 TABLET BY MOUTH DAILY 90 tablet 3    meloxicam (MOBIC) 15 MG tablet TAKE 1 TABLET(15 MG) BY MOUTH EVERY DAY 14 tablet 2    simvastatin (ZOCOR) 20 MG tablet TAKE 1 TABLET BY MOUTH EVERY DAY AT BEDTIME 90 tablet 3    tadalafiL (CIALIS) 20 MG Tab Take 20 mg by mouth once daily.      tamsulosin (FLOMAX) 0.4 mg Cap Take 2 capsules by mouth once daily.      telmisartan (MICARDIS) 80 MG Tab TAKE 1 TABLET BY MOUTH DAILY 90 tablet 3    testosterone cypionate (DEPOTESTOTERONE CYPIONATE) 200 mg/mL injection Inject 1 mL (200 mg total) into the muscle every 14 (fourteen) days. 10 mL 4     No current  "facility-administered medications on file prior to visit.     Social History     Socioeconomic History    Marital status:    Tobacco Use    Smoking status: Never    Smokeless tobacco: Never   Substance and Sexual Activity    Alcohol use: Yes    Drug use: Never    Sexual activity: Yes     Family History   Family history unknown: Yes       ROS:   Review of Systems  Constitutional: No weight gain, No fever, No chills, No fatigue.   Eyes: No blurring, No visual disturbances.   Ear/Nose/Mouth/Throat: No decreased hearing, No ear pain, No nasal congestion, No sore throat.   Respiratory: No shortness of breath, No cough, No wheezing.   Cardiovascular: No chest pain, No palpitations, No peripheral edema.   Gastrointestinal: No nausea, No vomiting, No diarrhea, No constipation, No abdominal pain.   Genitourinary: No dysuria, No hematuria.   Hematology/Lymphatics: No bruising tendency, No bleeding tendency, No swollen lymph glands.   Endocrine: No excessive thirst, No polyuria, No excessive hunger.   Musculoskeletal: No joint pain, No muscle pain, No decreased range of motion.   Integumentary: No rash, No pruritus.   Neurologic: No abnormal balance, No confusion, No headache.   Psychiatric: No anxiety, No depression, Not suicidal, No hallucinations.      Vitals/PE:   /86 (BP Location: Left arm)   Pulse 103   Ht 6' 5" (1.956 m)   Wt 122 kg (269 lb)   SpO2 96%   BMI 31.90 kg/m²   Physical Exam    General: Alert and oriented, No acute distress.   Eye: Normal conjunctiva without exudate.  HENMT: Normocephalic/AT, Normal hearing, Oral mucosa is moist and pink   Neck: No goiter visualized.   Respiratory: Lungs CTAB, Respirations are non-labored, Breath sounds are equal, Symmetrical chest wall expansion.  Cardiovascular: Normal rate, Regular rhythm, No murmur, No edema.   Gastrointestinal: Non-distended.   Genitourinary: Deferred.  Musculoskeletal: Normal ROM, Normal gait, No deformities or " amputations.  Integumentary: Warm, Dry, Intact. No diaphoresis, or flushing.  Neurologic: No focal deficits, Cranial Nerves II-XII are grossly intact.   Psychiatric: Cooperative, Appropriate mood & affect, Normal judgment, Non-suicidal.    Assessment/Plan:   ..  Problem List Items Addressed This Visit          Cardiac/Vascular    Primary hypertension    Peripheral vascular disease, unspecified - Primary       Other    Annual physical exam      Recommendations:  Diet (healthy food choices, reduce portions and overall calorie intake)  Exercise 30-45 minutes at least 3x per week  Avoid excessive alcohol intake and tobacco use  Stay UTD with immunizations and preventative screenings   Yearly Labs   Glucose 94 BUN 18 creatinine 1.0 with a GFR of 84 calcium for the 1st time slightly elevated at 11.0.  Patient advised to increase water intake to at least 100 oz of water a day   Cholesterol 159 good cholesterol 45 LDL 98 and triglycerides 70 H&H 15 and 48 PSA 4.7 and but he recently see Dr. Del Angel with no other recommendation testosterone replacement therapy shows a level of 1456 hemoglobin A1c of 5.8       ..No orders of the defined types were placed in this encounter.        I am having Zachery Cordero maintain his finasteride, tadalafiL, tamsulosin, telmisartan, testosterone cypionate, levothyroxine, simvastatin, and meloxicam.    No orders of the defined types were placed in this encounter.      Education and counseling done face to face regarding medical conditions and plan. Contact office if new symptoms develop. Should any symptoms ever significantly worsen seek emergency medical attention/go to ER. Follow up at least yearly for wellness or sooner PRN. Nurse to call patient with any results. The patient is receptive, expresses understanding and is agreeable to plan. All questions have been answered.    Follow up in about 6 months (around 8/29/2024).

## 2024-03-20 DIAGNOSIS — M17.12 PRIMARY OSTEOARTHRITIS OF LEFT KNEE: ICD-10-CM

## 2024-03-20 RX ORDER — MELOXICAM 15 MG/1
15 TABLET ORAL
Qty: 14 TABLET | Refills: 2 | Status: SHIPPED | OUTPATIENT
Start: 2024-03-20 | End: 2024-04-10 | Stop reason: SDUPTHER

## 2024-03-27 DIAGNOSIS — I10 PRIMARY HYPERTENSION: ICD-10-CM

## 2024-03-27 RX ORDER — TELMISARTAN 80 MG/1
TABLET ORAL
Qty: 90 TABLET | Refills: 3 | Status: SHIPPED | OUTPATIENT
Start: 2024-03-27

## 2024-04-10 ENCOUNTER — OFFICE VISIT (OUTPATIENT)
Dept: ORTHOPEDICS | Facility: CLINIC | Age: 64
End: 2024-04-10
Payer: COMMERCIAL

## 2024-04-10 ENCOUNTER — PATIENT MESSAGE (OUTPATIENT)
Dept: ORTHOPEDICS | Facility: CLINIC | Age: 64
End: 2024-04-10

## 2024-04-10 VITALS
BODY MASS INDEX: 32.66 KG/M2 | HEART RATE: 64 BPM | DIASTOLIC BLOOD PRESSURE: 100 MMHG | SYSTOLIC BLOOD PRESSURE: 146 MMHG | HEIGHT: 76 IN | WEIGHT: 268.19 LBS

## 2024-04-10 DIAGNOSIS — M17.12 PRIMARY OSTEOARTHRITIS OF LEFT KNEE: Primary | ICD-10-CM

## 2024-04-10 PROCEDURE — 3077F SYST BP >= 140 MM HG: CPT | Mod: CPTII,,, | Performed by: ORTHOPAEDIC SURGERY

## 2024-04-10 PROCEDURE — 4010F ACE/ARB THERAPY RXD/TAKEN: CPT | Mod: CPTII,,, | Performed by: ORTHOPAEDIC SURGERY

## 2024-04-10 PROCEDURE — 3008F BODY MASS INDEX DOCD: CPT | Mod: CPTII,,, | Performed by: ORTHOPAEDIC SURGERY

## 2024-04-10 PROCEDURE — 1159F MED LIST DOCD IN RCRD: CPT | Mod: CPTII,,, | Performed by: ORTHOPAEDIC SURGERY

## 2024-04-10 PROCEDURE — 3080F DIAST BP >= 90 MM HG: CPT | Mod: CPTII,,, | Performed by: ORTHOPAEDIC SURGERY

## 2024-04-10 PROCEDURE — 99213 OFFICE O/P EST LOW 20 MIN: CPT | Mod: ,,, | Performed by: ORTHOPAEDIC SURGERY

## 2024-04-10 PROCEDURE — 1101F PT FALLS ASSESS-DOCD LE1/YR: CPT | Mod: CPTII,,, | Performed by: ORTHOPAEDIC SURGERY

## 2024-04-10 PROCEDURE — 3288F FALL RISK ASSESSMENT DOCD: CPT | Mod: CPTII,,, | Performed by: ORTHOPAEDIC SURGERY

## 2024-04-10 RX ORDER — MELOXICAM 15 MG/1
15 TABLET ORAL DAILY
Qty: 30 TABLET | Refills: 3 | Status: ON HOLD | OUTPATIENT
Start: 2024-04-10 | End: 2024-06-12 | Stop reason: HOSPADM

## 2024-04-10 NOTE — PROGRESS NOTES
Chief Complaint:   Chief Complaint   Patient presents with    Left Knee - Follow-up     Left knee- here to decide if doing a left knee replacement, question about mobic refills, Mobic daily -works til about 5 pm        Consulting Physician: No ref. provider found    History of present illness:    he  is a pleasant 63 y.o. year old male  who presents with left knee pain since October 2023. He denies injury. He woke up with pain and swelling. His pain is global in the knee. It is worse in the morning and with increased activity. It is somewhat better with rest. He has been wearing a knee brace without much relief. He is taking OTC Ibuprofen without much relief. He is s/p right knee PLM in 2019 with good results.     He returns today.  We tried the Mobic which does seem to help.  He is working on home exercise program.  We tried an injection in November of 2023 with limited relief.    Past Medical History:   Diagnosis Date    HLD (hyperlipidemia)     HTN (hypertension)     Hypothyroidism, unspecified     Personal history of colonic polyps 06/20/2018    Colonoscopy Report    Unspecified sensorineural hearing loss        Past Surgical History:   Procedure Laterality Date    COLONOSCOPY  06/20/2018    COLONOSCOPY W/ POLYPECTOMY  06/27/2023    KNEE SURGERY         Current Outpatient Medications   Medication Sig    finasteride (PROSCAR) 5 mg tablet Take 5 mg by mouth once daily.    levothyroxine (SYNTHROID) 50 MCG tablet TAKE 1 TABLET BY MOUTH DAILY    meloxicam (MOBIC) 15 MG tablet TAKE 1 TABLET(15 MG) BY MOUTH EVERY DAY    simvastatin (ZOCOR) 20 MG tablet TAKE 1 TABLET BY MOUTH EVERY DAY AT BEDTIME    tadalafiL (CIALIS) 20 MG Tab Take 20 mg by mouth once daily.    tamsulosin (FLOMAX) 0.4 mg Cap Take 2 capsules by mouth once daily.    telmisartan (MICARDIS) 80 MG Tab TAKE 1 TABLET BY MOUTH DAILY    testosterone cypionate (DEPOTESTOTERONE CYPIONATE) 200 mg/mL injection Inject 1 mL (200 mg total) into the muscle every 14  "(fourteen) days.     No current facility-administered medications for this visit.       Review of patient's allergies indicates:  No Known Allergies    Family History   Family history unknown: Yes       Social History     Socioeconomic History    Marital status:    Tobacco Use    Smoking status: Never    Smokeless tobacco: Never   Substance and Sexual Activity    Alcohol use: Yes    Drug use: Never    Sexual activity: Yes       Review of Systems:    Constitution:   Denies chills, fever, and sweats.  HENT:   Denies headaches or blurry vision.  Cardiovascular:  Denies chest pain or irregular heart beat.  Respiratory:   Denies cough or shortness of breath.  Gastrointestinal:  Denies abdominal pain, nausea, or vomiting.  Musculoskeletal:   Denies muscle cramps.  Neurological:   Denies dizziness or focal weakness.  Psychiatric/Behavior: Normal mental status.  Hematology/Lymph:  Denies bleeding problem or easy bruising/bleeding.  Skin:    Denies rash or suspicious lesions.    Examination:    Vital Signs:    Vitals:    04/10/24 0959 04/10/24 1001   BP: (!) 159/103 (!) 146/100   Pulse: 71 64   Weight: 121.7 kg (268 lb 3.2 oz)    Height: 6' 3.59" (1.92 m)        Body mass index is 33 kg/m².    Constitution:   Well-developed, well nourished patient in no acute distress.  Neurological:   Alert and oriented x 3 and cooperative to examination.     Psychiatric/Behavior: Normal mental status.  Respiratory:   No shortness of breath.  Cards:    Pulses palpable and symmetric, brisk cap refill   Eyes:    Extraoccular muscles intact  Skin:    No scars, rash or suspicious lesions.    MSK:   Standing exam  stance: normal alignment, no significant leg-length discrepancy  gait: antalgic limp     Knee examination  - General comments: unremarkable appearance    - Tenderness: global     Knee                  RIGHT    LEFT  Skin:                  Intact      Intact  ROM:                 0-130      0-130  Effusion:             Neg        "  +  MJL TTP:           Neg         Neg  LJL TTP:            Neg         Neg  David:         Neg         Neg  Pat crep:            Neg         +  Patella TTPs:     Neg         Neg  Patella grind:      Neg        Neg  Lachman:           Neg        Neg  Pivot shift:          Neg        Neg  Valgus stress:    Neg        Neg  Varus stress:      Neg        Neg  Posterior drawer: Neg       Neg    N-V intact intact  Hip: nml nml    Lower extremity edema:Negative       Imaging: X-rays from 11/2023 interpreted today personally by me of 4 views of the left knee show osteoarthritis        Assessment: Primary osteoarthritis of left knee  -     CT Knee w/o Contrast Left w/Willie Protocol; Future; Expected date: 04/10/2024        Plan:  I have recommended surgical intervention:  Left total knee arthroplasty.  We discussed the details of the procedure and expected postoperative course.  We discussed the benefits of surgery which be to decrease his pain and increase function.  We discussed the risks of surgery which is small but could be significant if he is continued pain, stiffness, or infection.  After discussion he would like to proceed.  We will set him up with the joint replacement class as well as with preoperative physical therapy.  I will see him back in a few weeks for preoperative visit.

## 2024-05-08 ENCOUNTER — OFFICE VISIT (OUTPATIENT)
Dept: ORTHOPEDICS | Facility: CLINIC | Age: 64
End: 2024-05-08
Payer: COMMERCIAL

## 2024-05-08 VITALS
WEIGHT: 162 LBS | HEIGHT: 76 IN | SYSTOLIC BLOOD PRESSURE: 129 MMHG | DIASTOLIC BLOOD PRESSURE: 89 MMHG | HEART RATE: 96 BPM | BODY MASS INDEX: 19.73 KG/M2

## 2024-05-08 DIAGNOSIS — M17.12 PRIMARY OSTEOARTHRITIS OF LEFT KNEE: Primary | ICD-10-CM

## 2024-05-08 PROCEDURE — 4010F ACE/ARB THERAPY RXD/TAKEN: CPT | Mod: CPTII,,, | Performed by: NURSE PRACTITIONER

## 2024-05-08 PROCEDURE — 3288F FALL RISK ASSESSMENT DOCD: CPT | Mod: CPTII,,, | Performed by: NURSE PRACTITIONER

## 2024-05-08 PROCEDURE — 1159F MED LIST DOCD IN RCRD: CPT | Mod: CPTII,,, | Performed by: NURSE PRACTITIONER

## 2024-05-08 PROCEDURE — 3074F SYST BP LT 130 MM HG: CPT | Mod: CPTII,,, | Performed by: NURSE PRACTITIONER

## 2024-05-08 PROCEDURE — 3008F BODY MASS INDEX DOCD: CPT | Mod: CPTII,,, | Performed by: NURSE PRACTITIONER

## 2024-05-08 PROCEDURE — 99213 OFFICE O/P EST LOW 20 MIN: CPT | Mod: ,,, | Performed by: NURSE PRACTITIONER

## 2024-05-08 PROCEDURE — 1101F PT FALLS ASSESS-DOCD LE1/YR: CPT | Mod: CPTII,,, | Performed by: NURSE PRACTITIONER

## 2024-05-08 PROCEDURE — 3079F DIAST BP 80-89 MM HG: CPT | Mod: CPTII,,, | Performed by: NURSE PRACTITIONER

## 2024-05-08 RX ORDER — METHOCARBAMOL 750 MG/1
750 TABLET, FILM COATED ORAL 3 TIMES DAILY
Qty: 21 TABLET | Refills: 0 | Status: SHIPPED | OUTPATIENT
Start: 2024-05-08 | End: 2024-05-15

## 2024-05-08 RX ORDER — GLUCOSAMINE/CHONDRO SU A 500-400 MG
1 TABLET ORAL 2 TIMES DAILY
COMMUNITY

## 2024-05-08 RX ORDER — FOLIC ACID 1 MG/1
1 TABLET ORAL DAILY
COMMUNITY

## 2024-05-08 RX ORDER — ACETAMINOPHEN 500 MG
1000 TABLET ORAL
Status: CANCELLED | OUTPATIENT
Start: 2024-05-08

## 2024-05-08 RX ORDER — MULTIVIT WITH MINERALS/HERBS
1 TABLET ORAL DAILY
COMMUNITY

## 2024-05-08 RX ORDER — MULTIVITAMIN
1 TABLET ORAL DAILY
COMMUNITY

## 2024-05-08 RX ORDER — SODIUM CHLORIDE, SODIUM GLUCONATE, SODIUM ACETATE, POTASSIUM CHLORIDE AND MAGNESIUM CHLORIDE 30; 37; 368; 526; 502 MG/100ML; MG/100ML; MG/100ML; MG/100ML; MG/100ML
INJECTION, SOLUTION INTRAVENOUS CONTINUOUS
Status: CANCELLED | OUTPATIENT
Start: 2024-05-08

## 2024-05-08 RX ORDER — ONDANSETRON 4 MG/1
4 TABLET, ORALLY DISINTEGRATING ORAL
Status: CANCELLED | OUTPATIENT
Start: 2024-05-08

## 2024-05-08 RX ORDER — SCOLOPAMINE TRANSDERMAL SYSTEM 1 MG/1
1 PATCH, EXTENDED RELEASE TRANSDERMAL ONCE AS NEEDED
Status: CANCELLED | OUTPATIENT
Start: 2024-05-08 | End: 2035-10-05

## 2024-05-08 RX ORDER — ASPIRIN 81 MG/1
81 TABLET ORAL 2 TIMES DAILY
Qty: 60 TABLET | Refills: 0 | Status: SHIPPED | OUTPATIENT
Start: 2024-05-08 | End: 2024-06-07

## 2024-05-08 RX ORDER — GABAPENTIN 100 MG/1
300 CAPSULE ORAL
Status: CANCELLED | OUTPATIENT
Start: 2024-05-08

## 2024-05-08 RX ORDER — TRANEXAMIC ACID 650 MG/1
1950 TABLET ORAL
Status: CANCELLED | OUTPATIENT
Start: 2024-05-08 | End: 2024-05-08

## 2024-05-08 RX ORDER — OXYCODONE AND ACETAMINOPHEN 5; 325 MG/1; MG/1
1 TABLET ORAL EVERY 6 HOURS PRN
Qty: 28 TABLET | Refills: 0 | Status: ON HOLD | OUTPATIENT
Start: 2024-05-08 | End: 2024-06-12 | Stop reason: HOSPADM

## 2024-05-08 RX ORDER — PREDNISOLONE ACETATE 10 MG/ML
1 SUSPENSION/ DROPS OPHTHALMIC 4 TIMES DAILY
COMMUNITY
Start: 2024-05-01

## 2024-05-08 RX ORDER — AMOXICILLIN 500 MG
CAPSULE ORAL DAILY
COMMUNITY

## 2024-05-08 RX ORDER — OFLOXACIN 3 MG/ML
SOLUTION/ DROPS OPHTHALMIC
COMMUNITY
Start: 2024-04-10

## 2024-05-08 RX ORDER — KETOROLAC TROMETHAMINE 10 MG/1
10 TABLET, FILM COATED ORAL
Status: CANCELLED | OUTPATIENT
Start: 2024-05-08 | End: 2024-05-08

## 2024-05-08 RX ORDER — KETOROLAC TROMETHAMINE 10 MG/1
10 TABLET, FILM COATED ORAL EVERY 8 HOURS PRN
Qty: 15 TABLET | Refills: 0 | Status: SHIPPED | OUTPATIENT
Start: 2024-05-08 | End: 2024-05-13

## 2024-05-08 NOTE — PROGRESS NOTES
Chief Complaint:   Chief Complaint   Patient presents with    Left Knee - Pre-op Exam     pre op left knee TKA sx 5/23/24, has questions about post-op and PT and how much he should push himself       Consulting Physician: No ref. provider found    History of present illness:    he  is a pleasant 63 y.o. year old male  who presents with left knee pain since October 2023. He denies injury. He woke up with pain and swelling. His pain is global in the knee. It is worse in the morning and with increased activity. It is somewhat better with rest. He has been wearing a knee brace without much relief. He is taking OTC Ibuprofen without much relief. He is s/p right knee PLM in 2019 with good results.     He returns today for pre-op. He has continued knee pain.     Past Medical History:   Diagnosis Date    Hearing loss     wears hearing aids    HLD (hyperlipidemia)     HTN (hypertension)     Hypothyroidism, unspecified     Personal history of colonic polyps 06/20/2018    Colonoscopy Report    Unspecified sensorineural hearing loss        Past Surgical History:   Procedure Laterality Date    COLONOSCOPY  06/20/2018    COLONOSCOPY W/ POLYPECTOMY  06/27/2023    hearing aids      KNEE SURGERY         Current Outpatient Medications   Medication Sig    b complex vitamins tablet Take 1 tablet by mouth once daily.    finasteride (PROSCAR) 5 mg tablet Take 5 mg by mouth once daily.    folic acid (FOLVITE) 1 MG tablet Take 1 mg by mouth once daily.    glucosamine-chondroitin 500-400 mg tablet Take 1 tablet by mouth 3 (three) times daily.    levothyroxine (SYNTHROID) 50 MCG tablet TAKE 1 TABLET BY MOUTH DAILY    meloxicam (MOBIC) 15 MG tablet Take 1 tablet (15 mg total) by mouth once daily.    multivitamin (ONE DAILY MULTIVITAMIN) per tablet Take 1 tablet by mouth once daily.    ofloxacin (OCUFLOX) 0.3 % ophthalmic solution INSTILL 1 DROP INTO RIGHT EYE THREE TIMES DAILY    omega-3 fatty acids/fish oil (FISH OIL-OMEGA-3 FATTY ACIDS)  300-1,000 mg capsule Take by mouth once daily. 4 a day    prednisoLONE acetate (PRED FORTE) 1 % DrpS Place 1 drop into the right eye 4 (four) times daily.    simvastatin (ZOCOR) 20 MG tablet TAKE 1 TABLET BY MOUTH EVERY DAY AT BEDTIME (Patient taking differently: 3 times a week)    tadalafiL (CIALIS) 20 MG Tab Take 20 mg by mouth once daily.    tamsulosin (FLOMAX) 0.4 mg Cap Take 2 capsules by mouth once daily.    telmisartan (MICARDIS) 80 MG Tab TAKE 1 TABLET BY MOUTH DAILY    testosterone cypionate (DEPOTESTOTERONE CYPIONATE) 200 mg/mL injection Inject 1 mL (200 mg total) into the muscle every 14 (fourteen) days. (Patient taking differently: Inject 200 mg into the muscle every 7 days.)     No current facility-administered medications for this visit.       Review of patient's allergies indicates:  No Known Allergies    Family History   Problem Relation Name Age of Onset    Hypertension Mother      Hypertension Father      Brain aneurysm Father         Social History     Socioeconomic History    Marital status:    Tobacco Use    Smoking status: Never    Smokeless tobacco: Never   Substance and Sexual Activity    Alcohol use: Yes     Comment: 1-2 a day and sometimes none    Drug use: Never    Sexual activity: Yes       Review of Systems:    Constitution:   Denies chills, fever, and sweats.  HENT:   Denies headaches or blurry vision.  Cardiovascular:  Denies chest pain or irregular heart beat.  Respiratory:   Denies cough or shortness of breath.  Gastrointestinal:  Denies abdominal pain, nausea, or vomiting.  Musculoskeletal:   Denies muscle cramps.  Neurological:   Denies dizziness or focal weakness.  Psychiatric/Behavior: Normal mental status.  Hematology/Lymph:  Denies bleeding problem or easy bruising/bleeding.  Skin:    Denies rash or suspicious lesions.    Examination:    Vital Signs:    Vitals:    05/08/24 1536 05/08/24 1539   BP: (!) 135/94 129/89   Pulse: 88 96   Weight: 73.5 kg (162 lb)    Height: 6'  "3.5" (1.918 m)        Body mass index is 19.98 kg/m².    Constitution:   Well-developed, well nourished patient in no acute distress.  Neurological:   Alert and oriented x 3 and cooperative to examination.     Psychiatric/Behavior: Normal mental status.  Respiratory:   No shortness of breath.  Cards:    Pulses palpable and symmetric, brisk cap refill   Eyes:    Extraoccular muscles intact  Skin:    No scars, rash or suspicious lesions.    MSK:   Standing exam  stance: normal alignment, no significant leg-length discrepancy  gait: antalgic limp     Knee examination  - General comments: unremarkable appearance    - Tenderness: global     Knee                  RIGHT    LEFT  Skin:                  Intact      Intact  ROM:                 0-130      0-130  Effusion:             Neg         +  MJL TTP:           Neg         Neg  LJL TTP:            Neg         Neg  David:         Neg         Neg  Pat crep:            Neg         +  Patella TTPs:     Neg         Neg  Patella grind:      Neg        Neg  Lachman:           Neg        Neg  Pivot shift:          Neg        Neg  Valgus stress:    Neg        Neg  Varus stress:      Neg        Neg  Posterior drawer: Neg       Neg    N-V intact intact  Hip: nml nml    Lower extremity edema:Negative       Imaging: X-rays from 11/2023 interpreted today personally by me of 4 views of the left knee show osteoarthritis        Assessment: Primary osteoarthritis of left knee          Plan: Pt is still agreeable to surgical intervention:  Left total knee arthroplasty.  We discussed the details of the procedure and expected postoperative course.  We discussed the benefits of surgery which be to decrease his pain and increase function.  We discussed the risks of surgery which is small but could be significant if he is continued pain, stiffness, or infection.  After discussion he would like to proceed.  Plans for surgery 5/23 as same day discharge.   "

## 2024-05-14 RX ORDER — ASCORBIC ACID 500 MG
500 TABLET ORAL DAILY
COMMUNITY

## 2024-05-14 RX ORDER — CHOLECALCIFEROL (VITAMIN D3) 25 MCG
1000 TABLET ORAL DAILY
COMMUNITY

## 2024-05-14 RX ORDER — ZINC GLUCONATE 50 MG
50 TABLET ORAL DAILY
COMMUNITY

## 2024-05-14 RX ORDER — FLUTICASONE PROPIONATE 50 MCG
1 SPRAY, SUSPENSION (ML) NASAL
COMMUNITY

## 2024-05-14 RX ORDER — GUAIFENESIN/PHENYLPROPANOLAMIN
500 EXPECTORANT ORAL DAILY
COMMUNITY

## 2024-05-15 ENCOUNTER — HOSPITAL ENCOUNTER (OUTPATIENT)
Dept: RADIOLOGY | Facility: HOSPITAL | Age: 64
Discharge: HOME | End: 2024-05-15
Attending: NURSE PRACTITIONER
Payer: COMMERCIAL

## 2024-05-15 ENCOUNTER — HOSPITAL ENCOUNTER (OUTPATIENT)
Dept: CARDIOLOGY | Facility: HOSPITAL | Age: 64
Discharge: HOME | End: 2024-05-15
Attending: NURSE PRACTITIONER
Payer: COMMERCIAL

## 2024-05-15 DIAGNOSIS — M17.12 PRIMARY OSTEOARTHRITIS OF LEFT KNEE: ICD-10-CM

## 2024-05-15 PROCEDURE — 93010 ELECTROCARDIOGRAM REPORT: CPT | Mod: ,,, | Performed by: INTERNAL MEDICINE

## 2024-05-15 PROCEDURE — 93005 ELECTROCARDIOGRAM TRACING: CPT

## 2024-05-15 PROCEDURE — 71046 X-RAY EXAM CHEST 2 VIEWS: CPT | Mod: TC

## 2024-05-15 PROCEDURE — 99900031 HC PATIENT EDUCATION (STAT)

## 2024-05-16 LAB
OHS QRS DURATION: 134 MS
OHS QTC CALCULATION: 448 MS

## 2024-05-21 ENCOUNTER — LAB VISIT (OUTPATIENT)
Dept: LAB | Facility: HOSPITAL | Age: 64
End: 2024-05-21
Attending: NURSE PRACTITIONER
Payer: COMMERCIAL

## 2024-05-21 ENCOUNTER — ANESTHESIA EVENT (OUTPATIENT)
Dept: SURGERY | Facility: HOSPITAL | Age: 64
End: 2024-05-21
Payer: MEDICARE

## 2024-05-21 DIAGNOSIS — M17.12 PRIMARY OSTEOARTHRITIS OF LEFT KNEE: Primary | ICD-10-CM

## 2024-05-21 LAB — MRSA PCR SCRN (OHS): NOT DETECTED

## 2024-05-21 PROCEDURE — 87641 MR-STAPH DNA AMP PROBE: CPT

## 2024-05-23 ENCOUNTER — ANESTHESIA (OUTPATIENT)
Dept: SURGERY | Facility: HOSPITAL | Age: 64
End: 2024-05-23
Payer: MEDICARE

## 2024-05-24 ENCOUNTER — TELEPHONE (OUTPATIENT)
Dept: INTERNAL MEDICINE | Facility: CLINIC | Age: 64
End: 2024-05-24
Payer: COMMERCIAL

## 2024-05-24 NOTE — TELEPHONE ENCOUNTER
----- Message from [facility] sent at 5/24/2024  8:33 AM CDT -----  .Type:  Needs Medical Advice    Who Called:  pt    Symptoms (please be specific):  fever, diarrhea      How long has patient had these symptoms:   2 days    Pharmacy name and phone #:   [facility] in [place]    Would the patient rather a call back or a response via [facility]?      Best Call Back Number:  [phone]    Additional Information:  pt states his knee surgery was canceled because of his symptoms he would like medication please advise thanks

## 2024-05-24 NOTE — TELEPHONE ENCOUNTER
Spoke to patient, advised he will need to go to [facility] for evaluation, we have no appts available currently and Dr. Bass is out of the office.    Due to surgery canceled, need to be seen for evaluation.    Patient verbalized understanding

## 2024-05-30 NOTE — CLINICAL REVIEW
labd, CXR, EKG reviewed. No cardiologist. Anesthesia to review. crc sd     (chart reviewed & cleared by ALISSA Correia MD. outside utd EKG procured and reviewed. Dr. ALISSA Bass office note reviewed. chart review complete. ccv).

## 2024-06-03 PROBLEM — Z00.00 ANNUAL PHYSICAL EXAM: Status: RESOLVED | Noted: 2024-02-29 | Resolved: 2024-06-03

## 2024-06-10 NOTE — H&P
Chief Complaint:   Left Knee - Pre-op Exam        pre op left knee TKA sx 5/23/24, has questions about post-op and PT and how much he should push himself       Consulting Physician: Pablito Abad Jr., MD    History of present illness:    he  is a pleasant 64 y.o. year old male  who presents with left knee pain since October 2023. He denies injury. He woke up with pain and swelling. His pain is global in the knee. It is worse in the morning and with increased activity. It is somewhat better with rest. He has been wearing a knee brace without much relief. He is taking OTC Ibuprofen without much relief. He is s/p right knee PLM in 2019 with good results.     He returns today for pre-op. He has continued knee pain.     Past Medical History:   Diagnosis Date    Arthritis     Dry eye     ED (erectile dysfunction)     Hearing loss     wears hearing aids    HLD (hyperlipidemia)     HTN (hypertension)     Hypothyroidism, unspecified     Personal history of colonic polyps 06/20/2018    Colonoscopy Report    Unspecified sensorineural hearing loss        Past Surgical History:   Procedure Laterality Date    COLONOSCOPY  06/20/2018    COLONOSCOPY W/ POLYPECTOMY  06/27/2023    hearing aids      KNEE ARTHROSCOPY Right     MOUTH SURGERY      salivary gland       No current facility-administered medications for this encounter.     Current Outpatient Medications   Medication Sig    ascorbic acid, vitamin C, (VITAMIN C) 500 MG tablet Take 500 mg by mouth once daily.    b complex vitamins tablet Take 1 tablet by mouth once daily.    fluticasone propionate (FLONASE) 50 mcg/actuation nasal spray 1 spray by Each Nostril route as needed.    folic acid (FOLVITE) 1 MG tablet Take 1 mg by mouth once daily.    glucosamine-chondroitin 500-400 mg tablet Take 1 tablet by mouth 2 (two) times a day.    levothyroxine (SYNTHROID) 50 MCG tablet TAKE 1 TABLET BY MOUTH DAILY (Patient taking differently: Take 50 mcg by mouth before breakfast.)     meloxicam (MOBIC) 15 MG tablet Take 1 tablet (15 mg total) by mouth once daily.    multivitamin (ONE DAILY MULTIVITAMIN) per tablet Take 1 tablet by mouth once daily.    NON FORMULARY MEDICATION Take 1 tablet by mouth Daily.    NON FORMULARY MEDICATION Take 1 tablet by mouth Daily.    ofloxacin (OCUFLOX) 0.3 % ophthalmic solution INSTILL 1 DROP INTO RIGHT EYE THREE TIMES DAILY    omega-3 fatty acids/fish oil (FISH OIL-OMEGA-3 FATTY ACIDS) 300-1,000 mg capsule Take by mouth once daily. 4 a day    prednisoLONE acetate (PRED FORTE) 1 % DrpS Place 1 drop into the right eye 4 (four) times daily.    saw palmetto 500 MG capsule Take 500 mg by mouth once daily.    simvastatin (ZOCOR) 20 MG tablet TAKE 1 TABLET BY MOUTH EVERY DAY AT BEDTIME (Patient taking differently: 3 times a week)    tadalafiL (CIALIS) 20 MG Tab Take 20 mg by mouth once daily.    tamsulosin (FLOMAX) 0.4 mg Cap Take 2 capsules by mouth 2 (two) times a day.    telmisartan (MICARDIS) 80 MG Tab TAKE 1 TABLET BY MOUTH DAILY    testosterone cypionate (DEPOTESTOTERONE CYPIONATE) 200 mg/mL injection Inject 1 mL (200 mg total) into the muscle every 14 (fourteen) days. (Patient taking differently: Inject 200 mg into the muscle every 7 days.)    vitamin D (VITAMIN D3) 1000 units Tab Take 1,000 Units by mouth once daily.    zinc gluconate 50 mg tablet Take 50 mg by mouth once daily.    aspirin (ECOTRIN) 81 MG EC tablet Take 1 tablet (81 mg total) by mouth 2 (two) times a day.    finasteride (PROSCAR) 5 mg tablet Take 5 mg by mouth once daily.    oxyCODONE-acetaminophen (PERCOCET) 5-325 mg per tablet Take 1 tablet by mouth every 6 (six) hours as needed for Pain.       Review of patient's allergies indicates:  No Known Allergies    Family History   Problem Relation Name Age of Onset    Hypertension Mother      Hypertension Father      Brain aneurysm Father         Social History     Socioeconomic History    Marital status:    Tobacco Use    Smoking status:  "Never    Smokeless tobacco: Never   Substance and Sexual Activity    Alcohol use: Yes     Comment: 1-2 a day and sometimes none    Drug use: Never    Sexual activity: Yes       Review of Systems:    Constitution:   Denies chills, fever, and sweats.  HENT:   Denies headaches or blurry vision.  Cardiovascular:  Denies chest pain or irregular heart beat.  Respiratory:   Denies cough or shortness of breath.  Gastrointestinal:  Denies abdominal pain, nausea, or vomiting.  Musculoskeletal:   Denies muscle cramps.  Neurological:   Denies dizziness or focal weakness.  Psychiatric/Behavior: Normal mental status.  Hematology/Lymph:  Denies bleeding problem or easy bruising/bleeding.  Skin:    Denies rash or suspicious lesions.    Examination:    Vital Signs:    Vitals:    05/14/24 0902   Weight: 117 kg (258 lb)   Height: 6' 3.51" (1.918 m)       Body mass index is 31.81 kg/m².    Constitution:   Well-developed, well nourished patient in no acute distress.  Neurological:   Alert and oriented x 3 and cooperative to examination.     Psychiatric/Behavior: Normal mental status.  Respiratory:   No shortness of breath.  Cards:    Pulses palpable and symmetric, brisk cap refill   Eyes:    Extraoccular muscles intact  Skin:    No scars, rash or suspicious lesions.    MSK:   Standing exam  stance: normal alignment, no significant leg-length discrepancy  gait: antalgic limp     Knee examination  - General comments: unremarkable appearance    - Tenderness: global     Knee                  RIGHT    LEFT  Skin:                  Intact      Intact  ROM:                 0-130      0-130  Effusion:             Neg         +  MJL TTP:           Neg         Neg  LJL TTP:            Neg         Neg  David:         Neg         Neg  Pat crep:            Neg         +  Patella TTPs:     Neg         Neg  Patella grind:      Neg        Neg  Lachman:           Neg        Neg  Pivot shift:          Neg        Neg  Valgus stress:    Neg        " Neg  Varus stress:      Neg        Neg  Posterior drawer: Neg       Neg    N-V intact intact  Hip: nml nml    Lower extremity edema:Negative       Imaging: X-rays from 11/2023 interpreted today personally by me of 4 views of the left knee show osteoarthritis        Assessment: Primary osteoarthritis of left knee         Plan: Pt is still agreeable to surgical intervention:  Left total knee arthroplasty.  We discussed the details of the procedure and expected postoperative course.  We discussed the benefits of surgery which be to decrease his pain and increase function.  We discussed the risks of surgery which is small but could be significant if he is continued pain, stiffness, or infection.  After discussion he would like to proceed.  Plans for surgery 6/11 as same day discharge.

## 2024-06-11 ENCOUNTER — HOSPITAL ENCOUNTER (OUTPATIENT)
Facility: HOSPITAL | Age: 64
Discharge: HOME | End: 2024-06-12
Attending: ORTHOPAEDIC SURGERY | Admitting: ORTHOPAEDIC SURGERY
Payer: COMMERCIAL

## 2024-06-11 DIAGNOSIS — M17.12 PRIMARY OSTEOARTHRITIS OF LEFT KNEE: ICD-10-CM

## 2024-06-11 LAB — POCT GLUCOSE: 105 MG/DL (ref 70–110)

## 2024-06-11 PROCEDURE — 27000221 HC OXYGEN, UP TO 24 HOURS

## 2024-06-11 PROCEDURE — 97116 GAIT TRAINING THERAPY: CPT

## 2024-06-11 PROCEDURE — 27201423 OPTIME MED/SURG SUP & DEVICES STERILE SUPPLY: Performed by: ORTHOPAEDIC SURGERY

## 2024-06-11 PROCEDURE — 88305 TISSUE EXAM BY PATHOLOGIST: CPT | Performed by: ORTHOPAEDIC SURGERY

## 2024-06-11 PROCEDURE — 36000712 HC OR TIME LEV V 1ST 15 MIN: Performed by: ORTHOPAEDIC SURGERY

## 2024-06-11 PROCEDURE — 51798 US URINE CAPACITY MEASURE: CPT

## 2024-06-11 PROCEDURE — 63600175 PHARM REV CODE 636 W HCPCS: Performed by: STUDENT IN AN ORGANIZED HEALTH CARE EDUCATION/TRAINING PROGRAM

## 2024-06-11 PROCEDURE — 94760 N-INVAS EAR/PLS OXIMETRY 1: CPT

## 2024-06-11 PROCEDURE — 25000003 PHARM REV CODE 250: Performed by: ORTHOPAEDIC SURGERY

## 2024-06-11 PROCEDURE — C1713 ANCHOR/SCREW BN/BN,TIS/BN: HCPCS | Performed by: ORTHOPAEDIC SURGERY

## 2024-06-11 PROCEDURE — 71000033 HC RECOVERY, INTIAL HOUR: Performed by: ORTHOPAEDIC SURGERY

## 2024-06-11 PROCEDURE — 25000003 PHARM REV CODE 250: Performed by: STUDENT IN AN ORGANIZED HEALTH CARE EDUCATION/TRAINING PROGRAM

## 2024-06-11 PROCEDURE — 64447 NJX AA&/STRD FEMORAL NRV IMG: CPT | Performed by: ANESTHESIOLOGY

## 2024-06-11 PROCEDURE — 37000009 HC ANESTHESIA EA ADD 15 MINS: Performed by: ORTHOPAEDIC SURGERY

## 2024-06-11 PROCEDURE — 82962 GLUCOSE BLOOD TEST: CPT | Performed by: ORTHOPAEDIC SURGERY

## 2024-06-11 PROCEDURE — 94761 N-INVAS EAR/PLS OXIMETRY MLT: CPT

## 2024-06-11 PROCEDURE — 63600175 PHARM REV CODE 636 W HCPCS: Performed by: ORTHOPAEDIC SURGERY

## 2024-06-11 PROCEDURE — 97162 PT EVAL MOD COMPLEX 30 MIN: CPT

## 2024-06-11 PROCEDURE — 36000713 HC OR TIME LEV V EA ADD 15 MIN: Performed by: ORTHOPAEDIC SURGERY

## 2024-06-11 PROCEDURE — 94799 UNLISTED PULMONARY SVC/PX: CPT

## 2024-06-11 PROCEDURE — 27447 TOTAL KNEE ARTHROPLASTY: CPT | Mod: AS,LT,, | Performed by: NURSE PRACTITIONER

## 2024-06-11 PROCEDURE — 25000003 PHARM REV CODE 250: Performed by: NURSE PRACTITIONER

## 2024-06-11 PROCEDURE — 99900035 HC TECH TIME PER 15 MIN (STAT)

## 2024-06-11 PROCEDURE — 0055T BONE SRGRY CMPTR CT/MRI IMAG: CPT | Mod: ,,, | Performed by: ORTHOPAEDIC SURGERY

## 2024-06-11 PROCEDURE — 27447 TOTAL KNEE ARTHROPLASTY: CPT | Mod: LT,,, | Performed by: ORTHOPAEDIC SURGERY

## 2024-06-11 PROCEDURE — 99900031 HC PATIENT EDUCATION (STAT)

## 2024-06-11 PROCEDURE — 63600175 PHARM REV CODE 636 W HCPCS: Mod: JZ,JG | Performed by: ANESTHESIOLOGY

## 2024-06-11 PROCEDURE — C1776 JOINT DEVICE (IMPLANTABLE): HCPCS | Performed by: ORTHOPAEDIC SURGERY

## 2024-06-11 PROCEDURE — 37000008 HC ANESTHESIA 1ST 15 MINUTES: Performed by: ORTHOPAEDIC SURGERY

## 2024-06-11 PROCEDURE — 88311 DECALCIFY TISSUE: CPT

## 2024-06-11 PROCEDURE — 51702 INSERT TEMP BLADDER CATH: CPT | Performed by: ORTHOPAEDIC SURGERY

## 2024-06-11 DEVICE — TIBIAL COMPONENT
Type: IMPLANTABLE DEVICE | Site: KNEE | Status: FUNCTIONAL
Brand: TRIATHLON

## 2024-06-11 DEVICE — TIBIAL BEARING INSERT - CS
Type: IMPLANTABLE DEVICE | Site: KNEE | Status: FUNCTIONAL
Brand: TRIATHLON

## 2024-06-11 DEVICE — CRUCIATE RETAINING FEMORAL
Type: IMPLANTABLE DEVICE | Site: KNEE | Status: FUNCTIONAL
Brand: TRIATHLON

## 2024-06-11 RX ORDER — SODIUM CHLORIDE, SODIUM GLUCONATE, SODIUM ACETATE, POTASSIUM CHLORIDE AND MAGNESIUM CHLORIDE 30; 37; 368; 526; 502 MG/100ML; MG/100ML; MG/100ML; MG/100ML; MG/100ML
INJECTION, SOLUTION INTRAVENOUS CONTINUOUS
Status: DISCONTINUED | OUTPATIENT
Start: 2024-06-11 | End: 2024-06-11

## 2024-06-11 RX ORDER — PHENYLEPHRINE HCL IN 0.9% NACL 1 MG/10 ML
SYRINGE (ML) INTRAVENOUS
Status: DISCONTINUED | OUTPATIENT
Start: 2024-06-11 | End: 2024-06-11

## 2024-06-11 RX ORDER — MORPHINE SULFATE 10 MG/ML
INJECTION INTRAMUSCULAR; INTRAVENOUS; SUBCUTANEOUS
Status: DISCONTINUED | OUTPATIENT
Start: 2024-06-11 | End: 2024-06-11 | Stop reason: HOSPADM

## 2024-06-11 RX ORDER — GLYCOPYRROLATE 0.2 MG/ML
INJECTION INTRAMUSCULAR; INTRAVENOUS
Status: DISCONTINUED | OUTPATIENT
Start: 2024-06-11 | End: 2024-06-11

## 2024-06-11 RX ORDER — METOCLOPRAMIDE HYDROCHLORIDE 5 MG/ML
10 INJECTION INTRAMUSCULAR; INTRAVENOUS
Status: DISPENSED | OUTPATIENT
Start: 2024-06-11 | End: 2024-06-12

## 2024-06-11 RX ORDER — TAMSULOSIN HYDROCHLORIDE 0.4 MG/1
0.4 CAPSULE ORAL 2 TIMES DAILY
Status: DISCONTINUED | OUTPATIENT
Start: 2024-06-11 | End: 2024-06-12 | Stop reason: HOSPADM

## 2024-06-11 RX ORDER — KETOROLAC TROMETHAMINE 10 MG/1
10 TABLET, FILM COATED ORAL EVERY 6 HOURS
Status: DISCONTINUED | OUTPATIENT
Start: 2024-06-11 | End: 2024-06-12 | Stop reason: HOSPADM

## 2024-06-11 RX ORDER — OXYCODONE AND ACETAMINOPHEN 5; 325 MG/1; MG/1
1 TABLET ORAL EVERY 4 HOURS PRN
Status: DISCONTINUED | OUTPATIENT
Start: 2024-06-11 | End: 2024-06-12

## 2024-06-11 RX ORDER — FOLIC ACID 1 MG/1
1 TABLET ORAL NIGHTLY
Status: DISCONTINUED | OUTPATIENT
Start: 2024-06-11 | End: 2024-06-12 | Stop reason: HOSPADM

## 2024-06-11 RX ORDER — KETOROLAC TROMETHAMINE 30 MG/ML
INJECTION, SOLUTION INTRAMUSCULAR; INTRAVENOUS
Status: DISPENSED
Start: 2024-06-11 | End: 2024-06-11

## 2024-06-11 RX ORDER — ACETAMINOPHEN 500 MG
1000 TABLET ORAL
Status: COMPLETED | OUTPATIENT
Start: 2024-06-11 | End: 2024-06-11

## 2024-06-11 RX ORDER — EPINEPHRINE 1 MG/ML
INJECTION, SOLUTION, CONCENTRATE INTRAVENOUS
Status: DISCONTINUED | OUTPATIENT
Start: 2024-06-11 | End: 2024-06-11 | Stop reason: HOSPADM

## 2024-06-11 RX ORDER — LIDOCAINE HYDROCHLORIDE 10 MG/ML
INJECTION, SOLUTION EPIDURAL; INFILTRATION; INTRACAUDAL; PERINEURAL
Status: DISCONTINUED | OUTPATIENT
Start: 2024-06-11 | End: 2024-06-11

## 2024-06-11 RX ORDER — EPINEPHRINE 1 MG/ML
INJECTION, SOLUTION, CONCENTRATE INTRAVENOUS
Status: DISPENSED
Start: 2024-06-11 | End: 2024-06-11

## 2024-06-11 RX ORDER — ACETAMINOPHEN 10 MG/ML
1000 INJECTION, SOLUTION INTRAVENOUS ONCE
Status: DISCONTINUED | OUTPATIENT
Start: 2024-06-11 | End: 2024-06-11 | Stop reason: HOSPADM

## 2024-06-11 RX ORDER — TAMSULOSIN HYDROCHLORIDE 0.4 MG/1
2 CAPSULE ORAL 2 TIMES DAILY
Status: CANCELLED | OUTPATIENT
Start: 2024-06-11

## 2024-06-11 RX ORDER — MORPHINE SULFATE 10 MG/ML
INJECTION INTRAMUSCULAR; INTRAVENOUS; SUBCUTANEOUS
Status: DISPENSED
Start: 2024-06-11 | End: 2024-06-11

## 2024-06-11 RX ORDER — BUPIVACAINE HYDROCHLORIDE 7.5 MG/ML
INJECTION, SOLUTION EPIDURAL; RETROBULBAR
Status: COMPLETED | OUTPATIENT
Start: 2024-06-11 | End: 2024-06-11

## 2024-06-11 RX ORDER — HYDROCODONE BITARTRATE AND ACETAMINOPHEN 5; 325 MG/1; MG/1
1 TABLET ORAL EVERY 4 HOURS PRN
Status: DISCONTINUED | OUTPATIENT
Start: 2024-06-11 | End: 2024-06-11

## 2024-06-11 RX ORDER — TALC
6 POWDER (GRAM) TOPICAL NIGHTLY PRN
Status: DISCONTINUED | OUTPATIENT
Start: 2024-06-11 | End: 2024-06-12 | Stop reason: HOSPADM

## 2024-06-11 RX ORDER — BISACODYL 10 MG/1
10 SUPPOSITORY RECTAL DAILY PRN
Status: DISCONTINUED | OUTPATIENT
Start: 2024-06-11 | End: 2024-06-12 | Stop reason: HOSPADM

## 2024-06-11 RX ORDER — MORPHINE SULFATE 4 MG/ML
4 INJECTION, SOLUTION INTRAMUSCULAR; INTRAVENOUS
Status: DISCONTINUED | OUTPATIENT
Start: 2024-06-11 | End: 2024-06-12 | Stop reason: HOSPADM

## 2024-06-11 RX ORDER — ONDANSETRON HYDROCHLORIDE 2 MG/ML
4 INJECTION, SOLUTION INTRAVENOUS EVERY 6 HOURS PRN
Status: DISCONTINUED | OUTPATIENT
Start: 2024-06-11 | End: 2024-06-12 | Stop reason: HOSPADM

## 2024-06-11 RX ORDER — ROPIVACAINE HYDROCHLORIDE 5 MG/ML
INJECTION, SOLUTION EPIDURAL; INFILTRATION; PERINEURAL
Status: DISPENSED
Start: 2024-06-11 | End: 2024-06-11

## 2024-06-11 RX ORDER — MIDAZOLAM HYDROCHLORIDE 1 MG/ML
INJECTION INTRAMUSCULAR; INTRAVENOUS
Status: DISCONTINUED | OUTPATIENT
Start: 2024-06-11 | End: 2024-06-11

## 2024-06-11 RX ORDER — SODIUM CHLORIDE 9 MG/ML
INJECTION, SOLUTION INTRAVENOUS CONTINUOUS
Status: DISCONTINUED | OUTPATIENT
Start: 2024-06-11 | End: 2024-06-12 | Stop reason: HOSPADM

## 2024-06-11 RX ORDER — DIPHENHYDRAMINE HYDROCHLORIDE 50 MG/ML
25 INJECTION INTRAMUSCULAR; INTRAVENOUS EVERY 6 HOURS PRN
Status: DISCONTINUED | OUTPATIENT
Start: 2024-06-11 | End: 2024-06-11 | Stop reason: HOSPADM

## 2024-06-11 RX ORDER — POLYETHYLENE GLYCOL 3350 17 G/17G
17 POWDER, FOR SOLUTION ORAL NIGHTLY
Status: DISCONTINUED | OUTPATIENT
Start: 2024-06-11 | End: 2024-06-12 | Stop reason: HOSPADM

## 2024-06-11 RX ORDER — PROPOFOL 10 MG/ML
VIAL (ML) INTRAVENOUS
Status: DISCONTINUED | OUTPATIENT
Start: 2024-06-11 | End: 2024-06-11

## 2024-06-11 RX ORDER — SODIUM CHLORIDE 0.9 % (FLUSH) 0.9 %
SYRINGE (ML) INJECTION
Status: DISPENSED
Start: 2024-06-11 | End: 2024-06-11

## 2024-06-11 RX ORDER — ROPIVACAINE HYDROCHLORIDE 5 MG/ML
INJECTION, SOLUTION EPIDURAL; INFILTRATION; PERINEURAL
Status: DISCONTINUED | OUTPATIENT
Start: 2024-06-11 | End: 2024-06-11 | Stop reason: HOSPADM

## 2024-06-11 RX ORDER — NAPROXEN SODIUM 220 MG/1
81 TABLET, FILM COATED ORAL 2 TIMES DAILY
Status: DISCONTINUED | OUTPATIENT
Start: 2024-06-12 | End: 2024-06-12 | Stop reason: HOSPADM

## 2024-06-11 RX ORDER — BUPIVACAINE HYDROCHLORIDE 2.5 MG/ML
INJECTION, SOLUTION EPIDURAL; INFILTRATION; INTRACAUDAL
Status: COMPLETED
Start: 2024-06-11 | End: 2024-06-11

## 2024-06-11 RX ORDER — KETOROLAC TROMETHAMINE 30 MG/ML
INJECTION, SOLUTION INTRAMUSCULAR; INTRAVENOUS
Status: DISCONTINUED | OUTPATIENT
Start: 2024-06-11 | End: 2024-06-11 | Stop reason: HOSPADM

## 2024-06-11 RX ORDER — THIAMINE HCL 100 MG
100 TABLET ORAL NIGHTLY
Status: DISCONTINUED | OUTPATIENT
Start: 2024-06-11 | End: 2024-06-12 | Stop reason: HOSPADM

## 2024-06-11 RX ORDER — LACTULOSE 10 G/15ML
20 SOLUTION ORAL EVERY 6 HOURS PRN
Status: DISCONTINUED | OUTPATIENT
Start: 2024-06-11 | End: 2024-06-12 | Stop reason: HOSPADM

## 2024-06-11 RX ORDER — HYDROMORPHONE HYDROCHLORIDE 2 MG/ML
0.4 INJECTION, SOLUTION INTRAMUSCULAR; INTRAVENOUS; SUBCUTANEOUS EVERY 5 MIN PRN
Status: DISCONTINUED | OUTPATIENT
Start: 2024-06-11 | End: 2024-06-11 | Stop reason: HOSPADM

## 2024-06-11 RX ORDER — SODIUM CHLORIDE 0.9 G/100ML
IRRIGANT IRRIGATION
Status: DISCONTINUED | OUTPATIENT
Start: 2024-06-11 | End: 2024-06-11 | Stop reason: HOSPADM

## 2024-06-11 RX ORDER — ONDANSETRON HYDROCHLORIDE 2 MG/ML
4 INJECTION, SOLUTION INTRAVENOUS DAILY PRN
Status: DISCONTINUED | OUTPATIENT
Start: 2024-06-11 | End: 2024-06-11 | Stop reason: HOSPADM

## 2024-06-11 RX ORDER — BUPIVACAINE HYDROCHLORIDE 2.5 MG/ML
INJECTION, SOLUTION EPIDURAL; INFILTRATION; INTRACAUDAL
Status: DISCONTINUED | OUTPATIENT
Start: 2024-06-11 | End: 2024-06-11

## 2024-06-11 RX ORDER — FAMOTIDINE 20 MG/1
20 TABLET, FILM COATED ORAL 2 TIMES DAILY
Status: DISCONTINUED | OUTPATIENT
Start: 2024-06-11 | End: 2024-06-12 | Stop reason: HOSPADM

## 2024-06-11 RX ORDER — METHOCARBAMOL 750 MG/1
750 TABLET, FILM COATED ORAL 3 TIMES DAILY PRN
Status: DISCONTINUED | OUTPATIENT
Start: 2024-06-11 | End: 2024-06-12 | Stop reason: HOSPADM

## 2024-06-11 RX ORDER — MIDAZOLAM HYDROCHLORIDE 2 MG/2ML
2 INJECTION, SOLUTION INTRAMUSCULAR; INTRAVENOUS ONCE AS NEEDED
Status: DISCONTINUED | OUTPATIENT
Start: 2024-06-11 | End: 2024-06-11 | Stop reason: HOSPADM

## 2024-06-11 RX ORDER — DIAZEPAM 5 MG/1
5 TABLET ORAL EVERY 6 HOURS PRN
Status: DISCONTINUED | OUTPATIENT
Start: 2024-06-11 | End: 2024-06-12 | Stop reason: HOSPADM

## 2024-06-11 RX ORDER — AMOXICILLIN 250 MG
2 CAPSULE ORAL 2 TIMES DAILY
Status: DISCONTINUED | OUTPATIENT
Start: 2024-06-11 | End: 2024-06-12 | Stop reason: HOSPADM

## 2024-06-11 RX ORDER — LOSARTAN POTASSIUM 50 MG/1
100 TABLET ORAL DAILY
Status: DISCONTINUED | OUTPATIENT
Start: 2024-06-12 | End: 2024-06-12 | Stop reason: HOSPADM

## 2024-06-11 RX ORDER — LEVOTHYROXINE SODIUM 50 UG/1
50 TABLET ORAL
Status: DISCONTINUED | OUTPATIENT
Start: 2024-06-12 | End: 2024-06-12 | Stop reason: HOSPADM

## 2024-06-11 RX ORDER — TRANEXAMIC ACID 650 MG/1
1950 TABLET ORAL
Status: COMPLETED | OUTPATIENT
Start: 2024-06-11 | End: 2024-06-11

## 2024-06-11 RX ORDER — KETOROLAC TROMETHAMINE 10 MG/1
10 TABLET, FILM COATED ORAL
Status: COMPLETED | OUTPATIENT
Start: 2024-06-11 | End: 2024-06-11

## 2024-06-11 RX ORDER — GABAPENTIN 300 MG/1
300 CAPSULE ORAL
Status: COMPLETED | OUTPATIENT
Start: 2024-06-11 | End: 2024-06-11

## 2024-06-11 RX ORDER — ONDANSETRON 4 MG/1
4 TABLET, ORALLY DISINTEGRATING ORAL
Status: COMPLETED | OUTPATIENT
Start: 2024-06-11 | End: 2024-06-11

## 2024-06-11 RX ORDER — LIDOCAINE HYDROCHLORIDE 10 MG/ML
1 INJECTION, SOLUTION EPIDURAL; INFILTRATION; INTRACAUDAL; PERINEURAL ONCE
Status: DISCONTINUED | OUTPATIENT
Start: 2024-06-11 | End: 2024-06-11 | Stop reason: HOSPADM

## 2024-06-11 RX ADMIN — PROPOFOL 100 MG: 10 INJECTION, EMULSION INTRAVENOUS at 08:06

## 2024-06-11 RX ADMIN — CEFAZOLIN 3 G: 2 INJECTION, POWDER, FOR SOLUTION INTRAMUSCULAR; INTRAVENOUS at 08:06

## 2024-06-11 RX ADMIN — ONDANSETRON 4 MG: 4 TABLET, ORALLY DISINTEGRATING ORAL at 07:06

## 2024-06-11 RX ADMIN — Medication 100 MCG: at 09:06

## 2024-06-11 RX ADMIN — LIDOCAINE HYDROCHLORIDE 50 MG: 10 INJECTION, SOLUTION EPIDURAL; INFILTRATION; INTRACAUDAL; PERINEURAL at 08:06

## 2024-06-11 RX ADMIN — Medication 100 MG: at 09:06

## 2024-06-11 RX ADMIN — KETOROLAC TROMETHAMINE 10 MG: 10 TABLET, FILM COATED ORAL at 07:06

## 2024-06-11 RX ADMIN — BUPIVACAINE HYDROCHLORIDE 1.7 ML: 7.5 INJECTION, SOLUTION EPIDURAL; RETROBULBAR at 08:06

## 2024-06-11 RX ADMIN — FOLIC ACID 1 MG: 1 TABLET ORAL at 09:06

## 2024-06-11 RX ADMIN — PHENYLEPHRINE HYDROCHLORIDE 25 MCG/MIN: 10 INJECTION INTRAVENOUS at 08:06

## 2024-06-11 RX ADMIN — CEFAZOLIN 2 G: 2 INJECTION, POWDER, FOR SOLUTION INTRAMUSCULAR; INTRAVENOUS at 12:06

## 2024-06-11 RX ADMIN — TRANEXAMIC ACID 1950 MG: 650 TABLET ORAL at 07:06

## 2024-06-11 RX ADMIN — ACETAMINOPHEN 1000 MG: 500 TABLET ORAL at 07:06

## 2024-06-11 RX ADMIN — Medication 50 MCG: at 08:06

## 2024-06-11 RX ADMIN — OXYCODONE HYDROCHLORIDE AND ACETAMINOPHEN 1 TABLET: 5; 325 TABLET ORAL at 11:06

## 2024-06-11 RX ADMIN — KETOROLAC TROMETHAMINE 10 MG: 10 TABLET, FILM COATED ORAL at 11:06

## 2024-06-11 RX ADMIN — FAMOTIDINE 20 MG: 20 TABLET, FILM COATED ORAL at 09:06

## 2024-06-11 RX ADMIN — CEFAZOLIN 2 G: 2 INJECTION, POWDER, FOR SOLUTION INTRAMUSCULAR; INTRAVENOUS at 06:06

## 2024-06-11 RX ADMIN — GABAPENTIN 300 MG: 300 CAPSULE ORAL at 07:06

## 2024-06-11 RX ADMIN — TAMSULOSIN HYDROCHLORIDE 0.4 MG: 0.4 CAPSULE ORAL at 12:06

## 2024-06-11 RX ADMIN — MIDAZOLAM 2 MG: 1 INJECTION INTRAMUSCULAR; INTRAVENOUS at 07:06

## 2024-06-11 RX ADMIN — BUPIVACAINE HYDROCHLORIDE 20 ML: 2.5 INJECTION, SOLUTION EPIDURAL; INFILTRATION; INTRACAUDAL; PERINEURAL at 10:06

## 2024-06-11 RX ADMIN — KETOROLAC TROMETHAMINE 10 MG: 10 TABLET, FILM COATED ORAL at 06:06

## 2024-06-11 RX ADMIN — THERA TABS 1 TABLET: TAB at 09:06

## 2024-06-11 RX ADMIN — POLYETHYLENE GLYCOL 3350 17 G: 17 POWDER, FOR SOLUTION ORAL at 09:06

## 2024-06-11 RX ADMIN — TAMSULOSIN HYDROCHLORIDE 0.4 MG: 0.4 CAPSULE ORAL at 09:06

## 2024-06-11 RX ADMIN — GLYCOPYRROLATE 0.2 MG: 0.2 INJECTION INTRAMUSCULAR; INTRAVENOUS at 07:06

## 2024-06-11 RX ADMIN — OXYCODONE HYDROCHLORIDE AND ACETAMINOPHEN 1 TABLET: 5; 325 TABLET ORAL at 04:06

## 2024-06-11 RX ADMIN — SENNOSIDES AND DOCUSATE SODIUM 2 TABLET: 8.6; 5 TABLET ORAL at 09:06

## 2024-06-11 RX ADMIN — KETOROLAC TROMETHAMINE 10 MG: 10 TABLET, FILM COATED ORAL at 12:06

## 2024-06-11 RX ADMIN — SODIUM CHLORIDE, SODIUM GLUCONATE, SODIUM ACETATE, POTASSIUM CHLORIDE AND MAGNESIUM CHLORIDE: 526; 502; 368; 37; 30 INJECTION, SOLUTION INTRAVENOUS at 07:06

## 2024-06-11 RX ADMIN — Medication 6 MG: at 09:06

## 2024-06-11 RX ADMIN — METOCLOPRAMIDE HYDROCHLORIDE 10 MG: 5 INJECTION INTRAMUSCULAR; INTRAVENOUS at 09:06

## 2024-06-11 RX ADMIN — SODIUM CHLORIDE, SODIUM GLUCONATE, SODIUM ACETATE, POTASSIUM CHLORIDE AND MAGNESIUM CHLORIDE: 526; 502; 368; 37; 30 INJECTION, SOLUTION INTRAVENOUS at 09:06

## 2024-06-11 RX ADMIN — SODIUM CHLORIDE: 9 INJECTION, SOLUTION INTRAVENOUS at 10:06

## 2024-06-11 RX ADMIN — METHOCARBAMOL TABLETS 750 MG: 750 TABLET, COATED ORAL at 09:06

## 2024-06-11 NOTE — OP NOTE
DATE OF SERVICE: 06/11/2024    SURGEON: Pablito Abad MD    ASSISTANT: Kirti Espinal, nurse practitioner.  Mrs. Espinal was essential in manipulating the knee while perform the arthroplasty, retraction, and closure    PREOPERATIVE DIAGNOSIS:   Left knee osteoarthritis    POSTOPERATIVE DIAGNOSIS:   Left knee osteoarthritis    PROCEDURE PERFORMED:  1. Left Total Knee arthroplasty with Willie robot assistance     ESTIMATED BLOOD LOSS: Minimal    COMPLICATIONS: None.    TOURNIQUET TIME: 70 minutes.    ANTIBIOTICS: Ancef    IMPLANTS: Vikki Triathlon: 7 Femur, 7 Tibia, 9mm Poly       INDICATIONS FOR PROCEDURE: Zachery Cordero is a 64 y.o. year old who has had ongoing left knee pain. The patient has had to limit activity that is impacting daily activities.  Conservative measures have been attempted, but unfortunately the pain and limited function has persisted.  Radiographs and a CT scan show advanced arthrosis.  I discussed with the patient the risks and benefits of a knee arthroplasty.  After discussion, the patient has elected to proceed    OPERATIVE REPORT: Zachery Cordero was initially seen in the preoperative holding area where history and physical were reviewed without change. The operative leg was marked, consents were reviewed, and any questions were answered for the patient and family. The patient was then taken back to the operating room, placed supine on the operating table with all bony prominences padded. Then a nonsterile tourniquet was placed around the upper thigh. The patient was induced under spinal anesthesia. The operative lower extremity was prepped and draped in standard sterile fashion. A timeout led by the surgeon was performed, and preoperative antibiotics were given. The limp was exsanguinated with gravity. The tourniquet was raised to 100 mmHg over the systolic blood pressure.    I started with a standard approach to the knee sharply incising through skin and subcutaneous tissue.  I developed flaps  and made a medial parapatellar arthrotomy.  The knee was inspected and there was advanced arthrosis in the medial compartment and lateral compartment. The patella cartilage was intact and retained.  After appropriate exposure, I dissected medially and lifted off the medial capsule of the knee in order to correct some of the varus deformity.  I then placed the arrays for the [employer] robot system.  I establish a hip center and ankle center.  I then mapped out the cartilage and bony anatomy.  I removed osteophytes.  Use the robot saw in order to create my femoral and tibial cuts.  Following this I trialed my components.  Trialing revealed full range of motion and stability throughout the knee.  Happy with this I irrigated out the knee fully. I then implanted my final implants.  I again irrigated out the knee and   used a Betadine bath.  I injected joint juice around the capsule.  Again, I took the knee through range of motion and was stable in both flexion and extension.  Happy with this and began closure.    The medial parapatellar arthrotomy was closed with Vicryl and Stratafix sutures.  Subcutaneous layer was also closed with Monocryl sutures.  The skin was closed with a running Monocryl.  Steristrips were used.  A sterile dressing was placed, and the tourniquet was deflated after 70 minutes. The patient was awakened from anesthesia and taken to the postoperative care unit in stable condition.

## 2024-06-11 NOTE — ANESTHESIA PREPROCEDURE EVALUATION
"                                                                                                             06/11/2024  Zachery Cordero is a 64 y.o., male with left knee pain and osteoarthritis for TKA.  DASI 10, good functional status.  He denies cardiopulmonary complaints.  He is able to do heavy yard work without issues.    "History of present illness:     he  is a pleasant 64 y.o. year old male  who presents with left knee pain since October 2023. He denies injury. He woke up with pain and swelling. His pain is global in the knee. It is worse in the morning and with increased activity. It is somewhat better with rest. He has been wearing a knee brace without much relief. He is taking OTC Ibuprofen without much relief. He is s/p right knee PLM in 2019 with good results.      He returns today for pre-op. He has continued knee pain.           Past Medical History:   Diagnosis Date    Arthritis      Dry eye      ED (erectile dysfunction)      Hearing loss       wears hearing aids    HLD (hyperlipidemia)      HTN (hypertension)      Hypothyroidism, unspecified      Personal history of colonic polyps 06/20/2018     Colonoscopy Report    Unspecified sensorineural hearing loss                 Past Surgical History:   Procedure Laterality Date    COLONOSCOPY   06/20/2018    COLONOSCOPY W/ POLYPECTOMY   06/27/2023    hearing aids        KNEE ARTHROSCOPY Right      MOUTH SURGERY         salivary gland     ...    Assessment: Primary osteoarthritis of left knee            Plan: Pt is still agreeable to surgical intervention:  Left total knee arthroplasty.  We discussed the details of the procedure and expected postoperative course.  We discussed the benefits of surgery which be to decrease his pain and increase function.  We discussed the risks of surgery which is small but could be significant if he is continued pain, stiffness, or infection.  After discussion he would like to proceed.  Plans for surgery 6/11 as same day " "discharge."    Pre-op Assessment    I have reviewed the Patient Summary Reports.     I have reviewed the Nursing Notes. I have reviewed the NPO Status.   I have reviewed the Medications.     Review of Systems  Anesthesia Hx:  No problems with previous Anesthesia             Denies Family Hx of Anesthesia complications.    Denies Personal Hx of Anesthesia complications.                    Cardiovascular:  Exercise tolerance: good   Hypertension       Denies Angina.                                  Pulmonary:  Pulmonary Normal      Denies Shortness of breath.                  Endocrine:   Hypothyroidism        Obesity / BMI > 30      Physical Exam  General: Well nourished, Cooperative, Alert and Oriented    Airway:  Mallampati: II   Mouth Opening: Normal  TM Distance: Normal  Tongue: Normal  Neck ROM: Normal ROM    Dental:  Intact    Chest/Lungs:  Clear to auscultation, Normal Respiratory Rate    Heart:  Rate: Normal  Rhythm: Regular Rhythm        Anesthesia Plan  Type of Anesthesia, risks & benefits discussed:    Anesthesia Type: Spinal, Gen Natural Airway  Intra-op Monitoring Plan: Standard ASA Monitors  Post Op Pain Control Plan: multimodal analgesia and peripheral nerve block  Informed Consent: Informed consent signed with the Patient and all parties understand the risks and agree with anesthesia plan.  All questions answered. Patient consented to blood products? Yes  ASA Score: 2    Ready For Surgery From Anesthesia Perspective.     .      "

## 2024-06-11 NOTE — NURSING
Nurses Note -- 4 Eyes      6/11/2024   11:14 AM      Skin assessed during: Admit      [x] No Altered Skin Integrity Present    [x]Prevention Measures Documented      [] Yes- Altered Skin Integrity Present or Discovered   [] LDA Added if Not in Epic (Describe Wound)   [] New Altered Skin Integrity was Present on Admit and Documented in LDA   [] Wound Image Taken    Wound Care Consulted? No    Attending Nurse:  Keysha Clarke RN/Staff Member:   sandra

## 2024-06-11 NOTE — PLAN OF CARE
06/11/24 1202   Discharge Planning   Assessment Type Discharge Planning Brief Assessment   Support Systems Spouse/significant other   Equipment Currently Used at Home walker, rolling   Current Living Arrangements home   Patient/Family Anticipates Transition to home with family   Patient/Family Anticipated Services at Transition outpatient care   DME Needed Upon Discharge  walker, rolling   Discharge Plan A Home with family   Discharge Plan B Home with family     SDD. S/p TKR. Spk w pt & wife, [place]... wife to asst w homecare. Pt has RW. Provider list given. Foc obtained.   Called referral for outpatient therapy to [facility] @ . They will contact pt with appt date & time.   Contact # [phone]    Patient DID participate in a pre-op exercise regimen  - [facility]

## 2024-06-11 NOTE — ANESTHESIA POSTPROCEDURE EVALUATION
Anesthesia Post Evaluation    Patient: Zachery Cordero    Procedure(s) Performed: Procedure(s) (LRB):  SDD- ROBOTIC ARTHROPLASTY, KNEE, TOTAL (Left)    Final Anesthesia Type: general      Patient location during evaluation: PACU  Patient participation: Yes- Able to Participate  Level of consciousness: awake and alert  Post-procedure vital signs: reviewed and stable  Pain management: adequate  Airway patency: patent      Anesthetic complications: no      Cardiovascular status: blood pressure returned to baseline  Respiratory status: unassisted  Hydration status: euvolemic  Follow-up not needed.              Vitals Value Taken Time   /91 06/11/24 1040   Temp 36.3 °C (97.3 °F) 06/11/24 1005   Pulse 84 06/11/24 1315   Resp 18 06/11/24 1315   SpO2 98 % 06/11/24 1315         Event Time   Out of Recovery 10:40:00         Pain/Sheree Score: Pain Rating Prior to Med Admin: 2 (6/11/2024 12:19 PM)  Sheree Score: 9 (6/11/2024 10:17 AM)

## 2024-06-11 NOTE — ANESTHESIA PROCEDURE NOTES
Spinal    Diagnosis: Osteoarthritis  Patient location during procedure: OR  Start time: 6/11/2024 7:56 AM  Timeout: 6/11/2024 7:55 AM  End time: 6/11/2024 8:01 AM    Staffing  Authorizing Provider: Huy Low MD  Performing Provider: Alison Mosley CRNA    Staffing  Performed by: Alison Mosley CRNA  Authorized by: Huy Low MD    Preanesthetic Checklist  Completed: patient identified, IV checked, site marked, risks and benefits discussed, surgical consent, monitors and equipment checked, pre-op evaluation and timeout performed  Spinal Block  Patient position: sitting  Prep: ChloraPrep  Patient monitoring: heart rate, continuous pulse ox and frequent blood pressure checks  Approach: midline  Location: L3-4  Injection technique: single shot  CSF Fluid: clear free-flowing CSF  Needle  Needle type: pencil-tip   Needle gauge: 25 G  Needle length: 3.5 in  Additional Documentation: incremental injection and negative aspiration for heme  Needle localization: anatomical landmarks  Assessment  Ease of block: easy  Patient's tolerance of the procedure: comfortable throughout block and no complaints  Medications:    Medications: bupivacaine (pf) (MARCAINE) injection 0.75% - Intraspinal   1.7 mL - 6/11/2024 8:01:00 AM

## 2024-06-11 NOTE — PLAN OF CARE
Problem: Adult Inpatient Plan of Care  Goal: Plan of Care Review  Outcome: Progressing  Goal: Patient-Specific Goal (Individualized)  Outcome: Progressing  Goal: Absence of Hospital-Acquired Illness or Injury  Outcome: Progressing  Goal: Optimal Comfort and Wellbeing  Outcome: Progressing  Goal: Readiness for Transition of Care  Outcome: Progressing     Problem: Infection  Goal: Absence of Infection Signs and Symptoms  Outcome: Progressing     Problem: Wound  Goal: Optimal Coping  Outcome: Progressing  Goal: Optimal Functional Ability  Outcome: Progressing  Goal: Absence of Infection Signs and Symptoms  Outcome: Progressing  Goal: Improved Oral Intake  Outcome: Progressing  Goal: Optimal Pain Control and Function  Outcome: Progressing  Goal: Skin Health and Integrity  Outcome: Progressing  Goal: Optimal Wound Healing  Outcome: Progressing     Problem: Knee Arthroplasty  Goal: Optimal Coping  Outcome: Progressing  Goal: Absence of Bleeding  Outcome: Progressing  Goal: Effective Bowel Elimination  Outcome: Progressing  Goal: Fluid and Electrolyte Balance  Outcome: Progressing  Goal: Optimal Functional Ability  Outcome: Progressing  Goal: Absence of Infection Signs and Symptoms  Outcome: Progressing  Goal: Intact Neurovascular Status  Outcome: Progressing  Goal: Anesthesia/Sedation Recovery  Outcome: Progressing  Goal: Optimal Pain Control and Function  Outcome: Progressing  Goal: Nausea and Vomiting Relief  Outcome: Progressing  Goal: Effective Urinary Elimination  Outcome: Progressing  Goal: Effective Oxygenation and Ventilation  Outcome: Progressing

## 2024-06-11 NOTE — TRANSFER OF CARE
"Anesthesia Transfer of Care Note    Patient: Zachery Cordero    Procedure(s) Performed: Procedure(s) (LRB):  SDD- ROBOTIC ARTHROPLASTY, KNEE, TOTAL (Left)    Patient location: PACU    Anesthesia Type: spinal and MAC    Transport from OR: Transported from OR on room air with adequate spontaneous ventilation    Post pain: adequate analgesia    Post assessment: no apparent anesthetic complications    Post vital signs: stable    Level of consciousness: awake and alert    Nausea/Vomiting: no nausea/vomiting    Complications: none    Transfer of care protocol was followed      Last vitals: Visit Vitals  BP (!) 143/88   Pulse 96   Temp 36.8 °C (98.3 °F)   Resp 18   Ht 6' 4" (1.93 m)   Wt 116.9 kg (257 lb 11.5 oz)   SpO2 96%   BMI 31.37 kg/m²     "

## 2024-06-11 NOTE — ANESTHESIA PROCEDURE NOTES
Peripheral Block    Patient location during procedure: pre-op   Block not for primary anesthetic.  Reason for block: at surgeon's request and post-op pain management   Post-op Pain Location: left knee   Start time: 6/11/2024 10:12 AM  Timeout: 6/11/2024 10:11 AM   End time: 6/11/2024 10:14 AM    Staffing  Authorizing Provider: Huy Low MD  Performing Provider: Huy Low MD    Staffing  Performed by: Huy Low MD  Authorized by: Huy Low MD    Preanesthetic Checklist  Completed: patient identified, IV checked, site marked, risks and benefits discussed, surgical consent, monitors and equipment checked, pre-op evaluation and timeout performed  Peripheral Block  Patient position: supine  Prep: ChloraPrep  Patient monitoring: heart rate, cardiac monitor, continuous pulse ox, continuous capnometry and frequent blood pressure checks  Block type: adductor canal  Laterality: left  Injection technique: single shot  Needle  Needle type: Stimuplex   Needle gauge: 21 G  Needle length: 4 in  Needle localization: anatomical landmarks and ultrasound guidance   -ultrasound image captured on disc.  Assessment  Injection assessment: negative aspiration, negative parasthesia and local visualized surrounding nerve  Paresthesia pain: none  Heart rate change: no  Slow fractionated injection: yes    Medications:    Medications: bupivacaine (pf) (MARCAINE) injection 0.25% - Perineural   20 mL - 6/11/2024 10:14:00 AM    Additional Notes  [facility]SS.  [facility] RN monitoring vitals throughout procedure.  Patient tolerated procedure well.     Plus 10cc exparel injected after 0.25% bupiv.

## 2024-06-11 NOTE — DISCHARGE INSTRUCTIONS
[facility]  [address]  [place] [address]  Phone [phone]       /      Fax [phone]  SURGEON: Dr. Abad    After discharge, all questions or concerns should be handled at your surgeon's office [phone]). If it is a weekend or after hours, you will get the surgeon on call.     Discharge Medications:    PAIN MANAGEMENT: Next Dose Available   Toradol/Ketorolac 10mg (Anti-inflammatory) - take every 8 hours, around the clock, for the next 5 days 830pm   Mobic/Meloxicam  (Anti-Inflammatory) - ok to restart home regimen AFTER TORADOL IS COMPLETE 6/18/24   Robaxin/Methocarbamol 750mg (Muscle Relaxer) - Every 6-8 hours AS NEEDED for muscle spasms, thigh pain or additional pain control 2pm   Norco 7.5/325mg (Hydrocodone/Acetaminophen) (Pain Med) - every 4-6 hours AS NEEDED for pain 430pm   COMPLICATION PREVENTION MEDS: Next Dose DUE   Aspirin 81mg twice a day for 6 weeks post-op for blood clot prevention PM on 6/12/24   Senokot S/Marilu-Colace 8.6/50mg - 2 tablets once or twice a day while on narcotics and muscle relaxers for constipation prevention PM on 6/12/24   Duricef/Cefadroxil 500mg (Antibiotic) - twice a day for 7 days post-op for infection prevention PM on 6/12/24   NOZIN NASAL  - twice a day for 2 weeks or until supply runs out, whichever comes first (Infection prevention) PM on 6/12/24   Take a medication once or twice a day while taking TORADOL and Aspirin at the same time to prevent heartburn PM on 6/12/24                           Total Knee Replacement                                                                                                                                    PAIN MEDICATIONS/PAIN MANAGEMENT: (Use the medication log in your discharge packet to keep track of your medications)    Toradol/Ketorolac 10mg (anti-inflammatory) - take every 8 hours around the clock for the next 5 days.   While on Toradol/Ketorolac and Aspirin (blood  thinner) at the same time, take a medication once or twice a day to protect your stomach (for the next 5 days) (Examples: Nexium, Prilosec, Prevacid, Omeprazole, Pepcid...etc). If you start having intolerable stomach issues, discontinue the Toradol completely.   Aside from Toradol, No other anti-inflammatories (Ibuprofen, Aleve, Motrin, Naproxen, Mobic/Meloxicam, Celebrex, Diclofenac/Voltaren...etc) for 2 weeks or until the wound is healed.      Mobic/Meloxicam  (Anti-inflammatory) - Ok to restart home dose AFTER TORADOL IS COMPLETE.   While on Mobic/Meloxicam and Aspirin (blood thinner) at the same time, take a medication once or twice a day to protect your stomach (for the next 10 days) (Examples: Nexium, Prilosec, Prevacid, Omeprazole, Pepcid...etc). If you start having intolerable stomach issues, discontinue the Mobic/Meloxicam completely.   Aside from Mobic, No other anti-inflammatories (Ibuprofen, Aleve, Motrin, Naproxen, Toradol/Ketorolac, Celebrex, Diclofenac/Voltaren...etc) for 2 weeks or until the wound is healed.      Norco 7.5/325mg (Hydrocodone/Acetaminophen) (pain pill) - You can take 1 tablet every 4-6 hours for pain. If the pain is mild, take 1/2 of a pill. Once you start taking a half of a pain pill, you can take a Tylenol 325mg with each dose for a little extra pain relief without side effects. Gradually decrease the use as the pain lessens. As you decrease the Norco, increase the Tylenol.    **NO MORE THAN 3000mg OF TYLENOL IN 24 HOURS**.     Robaxin/Methocarbamol 750mg (muscle relaxer)- you can take every 6-8 hours as needed for muscle spasms, thigh pain and stiffness, additional pain control or breakthrough pain medications. This medication is helpful for pain control while lessening your need for narcotics. Please reduce the use gradually as the pain and spasms lessen. DO NOT TAKE AT THE SAME TIME AS A PAIN PILL. YOU WILL BE BETTER SERVED WITH 2 HOURS BETWEEN PAIN PILL AND MUSCLE RELAXER.      **Other things that help with pain control is WALKING, COMPRESSION WRAP, ICE and ELEVATION!!**    BLOOD CLOT PREVENTION:   Aspirin 81mg (blood thinner) - twice a day for 6 weeks for blood clot prevention. Start on the evening of 6/12/24. Stop on 7/24/24.  If you were taking Baby Aspirin 81mg prior to surgery, may return to daily dose AFTER 6 weeks - 7/25/24.  You need to continuing wearing your compression stocking (ESTUARDO Hose - ThromboEmbolic Disease Prevention Device) for the next 2-6 weeks post-op. It is ok to remove them for hygiene and at bedtime.   Hand wash and Dry. **If the swelling persists in the legs after you stop wearing the Estuardo hose, continue to wear them until the swelling decreases.**  REMOVE STOCKINGS AT LEAST DAILY FOR SKIN ASSESSMENT.   Do NOT let the stockings roll down, creating a tourniquet around the back of your knee. If you need to, leave the excess at the bottom of the stocking.   The best thing you can do to prevent blood clots is to walk around as much as possible, AT LEAST EVERY 1-2 HOURS.       CONSTIPATION PREVENTION:   Miralax or Senokot S/Marilu-Colace and Stool softeners EVERY DAY while on pain meds.  Use other more aggressive over the counter LAXATIVES as needed for constipation (Examples: Milk of Magnesia, Dulcolax tabs or suppository, Magnesium Citrate, Fleet's Enema...etc.)   Drink lots of water.  Increase Fiber in diet.  Increase walking distance each day  DO NOT GO MORE THAN 2 DAYS WITHOUT HAVING A BOWEL MOVEMENT!    ACTIVITY:   Weight bearing precautions as follows:  FULL weight bearing to operative leg with walker.   DO NOT TAKE YOURSELF OFF OF THE WALKER TOO SOON. ALLOW YOUR OUTPATIENT THERAPIST or SURGEON TO GUIDE YOU.   Range of motion as tolerated. Work on BENDING and STRAIGHTENING your knee. Change positions often throughout the day. DO NOT PUT ANYTHING BEHIND THE KNEE KEEPING IT IN A BENT POSITION.   Elevate affected extremity way ABOVE THE LEVEL OF THE HEART to reduce  swelling.  Walk around at least every 1-2 hours while awake.   No heavy lifting, pulling, pushing or straining.  Ice the Knee, thigh and lower leg AS MUCH AS POSSIBLE  Outpatient Physical Therapy - Bring prescription to clinic of choice as soon as possible.      WOUND CARE:     Operative Knee Incision - Grey Mepilex 7-day bandage- Do NOT REMOVE until change date 6/18/24 unless soiled. NO ointments, creams, lotions or antiseptics on the incision. Once removed on the change date, apply occlusive coverlet dressing (long white bandage) and change every other day and as needed for soiling for the next 7 days.      NOTIFY MD OF EXCESSIVE WOUND DRAINAGE.     DO NOT WET WOUND or apply any ointments, creams, lotions or antiseptics.  Ace wrap - apply your compression stocking and apply the ace wrap where the stocking stops for extra added compression to the knee.   May wet incision AFTER 2 weeks- (after you follow-up with your surgeon).   Ok to shower before then if able to keep wound from getting wet (plastic barrier, saran wrap or cling wrap and tape).   DO NOT TOUCH INCISION      URINARY RETENTION:  Flomax/Tamsulosin 0.4mg and Saw Palmetto - continue home regimen. Follow-up with Dr. Del Angel on Thursday for catheter removal and further direction.   If you start having difficulty urinating, decrease the use of Pain pills and muscle relaxers and notify your primary care doctor.     PNEUMONIA PREVENTION:  Stay out of bed as much as possible and walk around every 1-2 hours.  Continue breathing exercises (Incentive Spirometry) every 1-2 hours while mobility is limited and while you are on pain pills.    FALL PREVENTION:  Wear sturdy shoes that fit well - Wearing shoes with high heels or slippery soles, or shoes that are too loose, can lead to falls. Walking around in bare feet, or only socks, can also increase your risk of falling.  Use walker as long as your surgeon and therapist recommend it  Use good lighting and  throw  rugs, electrical cords, furniture and clutter (anything than can cause you to trip at home.   Non-slip rug in bathroom or shower      INFECTION PREVENTION:  NOZIN ANTISEPTIC NASAL  - twice a day for 2 weeks or until supply runs out, whichever comes first. Shake bottle well, saturate cotton swab with 4 drops of antiseptic solution. Swab right nostril rim six times clockwise and counterclockwise. Take swab out, apply 2 more drops then swab left nostril rim six times clockwise and counterclockwise.   Duricef/Cefadroxil 500mg (Antibiotic) -  take twice a day for 7 days to prevent infection  Proper handwashing before and after dressing changes. Do not wet the wound. Wound care instructions as written above. NOTIFY MD OF EXCESSIVE WOUND DRAINAGE.  No alcohol, smoking or tobacco products  Pets should not be allowed around the wound or the dressing.   Treat UTI and skin infections as soon as possible.  Pre-medicate with antibiotics prior to dental or surgical procedures.   If you are diabetic, MAINTAIN GOOD BLOOD SUGAR CONTROL (Below 150) DURING YOUR RECOVERY. If you see high numbers, notify your primary care doctor.     Call your SURGEON'S OFFICE ([phone]) if you experience the following signs and symptoms of infection:   Unusual redness, swelling, excessive, cloudy or foul smelling drainage at the incision site.   Persistent low grade temp OR a temp greater than 102 F, unrelieved by Tylenol  Pain at surgical site, unrelieved by pain meds    Warning signs of a blood clot in your leg: (CALL YOUR SURGEON)  New onset or increasing pain in calf, new onset tenderness or redness above or below the knee or increasing swelling of your calf, ankle, or foot.  Warning signs that a blood clot has traveled to your lungs: (REPORT TO THE ER/CALL [phone])  Sudden or increase in Shortness of breath, sudden onset of chest pains, or  Localized chest pain with coughing.       IF ANY ISSUES ARISE AND YOU FEEL THE NEED TO CALL YOUR  PRIMARY CARE DOCTOR, PLEASE LET YOUR SURGEON KNOW AS WELL.     For emergencies, please report to OUR ([facility]) Emergency department and tell them to call YOUR SURGEON at [phone].     BEFORE MAKING ANY CHANGES TO THE MEDICAL CARE PLAN OR GOING TO THE EMERGENCY ROOM, PLEASE CONTACT THE SURGEON.    3rd floor nursing unit # [phone]  Use this number for questions about your discharge instructions or problems filling your discharge prescriptions.

## 2024-06-11 NOTE — PT/OT/SLP EVAL
Physical Therapy Evaluation    Patient Name:  Zachery Cordero   MRN:  [id number]    Recommendations:     Discharge Recommendations: Low Intensity Therapy   Discharge Equipment Recommendations: walker, rolling   Barriers to discharge: None    Assessment:     Zachery Cordero is a 64 y.o. male admitted with a medical diagnosis of Primary osteoarthritis of left knee.  He presents with the following impairments/functional limitations: weakness, impaired endurance, impaired functional mobility, decreased lower extremity function, pain, decreased ROM, edema, orthopedic precautions .    Rehab Prognosis: Good; patient would benefit from acute skilled PT services to address these deficits and reach maximum level of function.    Recent Surgery: Procedure(s) (LRB):  SDD- ROBOTIC ARTHROPLASTY, KNEE, TOTAL (Left) Day of Surgery    Plan:     During this hospitalization, patient to be seen   to address the identified rehab impairments via   and progress toward the following goals:    Plan of Care Expires:       Subjective     Chief Complaint: L knee pain  Patient/Family Comments/goals:   Pain/Comfort:  Location - Side 1: Left  Location 1: knee  Pain Addressed 1: Pre-medicate for activity, Reposition, Distraction, Cessation of Activity    Patients cultural, spiritual, Restoration conflicts given the current situation:      Living Environment:  Pt lives in single story home with wife, only small threshold to enter.  Prior to admission, patients level of function was ind.  Equipment used at home: none.  DME owned (not currently used): rolling walker.  Upon discharge, patient will have assistance from wife.    Objective:     Communicated with nurse prior to session.  Patient found supine with peripheral IV, telemetry  upon PT entry to room.    General Precautions: Standard, fall  Orthopedic Precautions:LLE weight bearing as tolerated   Braces:    Respiratory Status: Room air    Exams:  RLE ROM: WFL  RLE Strength: WFL  LLE ROM:     (In  "degrees) AROM PROM   L knee flexion 90 100   L knee extension 0       LLE Strength: Nt dt sx side    Functional Mobility:  Bed Mobility:     Supine to Sit: stand by assistance  Sit to Supine: stand by assistance  Transfers:     Sit to Stand:  stand by assistance with rolling walker  Bed to Chair: stand by assistance with  rolling walker  using  Step Transfer  Toilet: stand by assistance with rolling walker   Car Transfer: stand by assistance with  rolling walker  using  Step Transfer  Gait: Pt ambulated 250 ft, 250 ft w rw and SBA, using step through gait pattern at slow pace  Stairs:  Pt ascended/descended 3 stair(s) and 4" curb step with Rolling Walker with bilateral handrails with Contact Guard Assistance.         Treatment & Education:  Pt edu on total knee protocol and importance of frequent mobility  Pt completed preahb prior to sx  Pt completed TKA therex x10 AAROM    Patient left up in chair with all lines intact, call button in reach, nurse notified, and wife present.    GOALS:   Multidisciplinary Problems       Physical Therapy Goals       Not on file                    History:     Past Medical History:   Diagnosis Date    Arthritis     Dry eye     ED (erectile dysfunction)     Hearing loss     wears hearing aids    HLD (hyperlipidemia)     HTN (hypertension)     Hypothyroidism, unspecified     Personal history of colonic polyps 06/20/2018    Colonoscopy Report    Unspecified sensorineural hearing loss        Past Surgical History:   Procedure Laterality Date    COLONOSCOPY  06/20/2018    COLONOSCOPY W/ POLYPECTOMY  06/27/2023    hearing aids      KNEE ARTHROSCOPY Right     MOUTH SURGERY      salivary gland       Time Tracking:     PT Received On:    PT Start Time: 1316     PT Stop Time: 1400  PT Total Time (min): 44 min     Billable Minutes: Evaluation 30 and Gait Training 14      06/11/2024  "

## 2024-06-12 VITALS
SYSTOLIC BLOOD PRESSURE: 135 MMHG | OXYGEN SATURATION: 95 % | TEMPERATURE: 99 F | DIASTOLIC BLOOD PRESSURE: 78 MMHG | HEART RATE: 100 BPM | WEIGHT: 257 LBS | BODY MASS INDEX: 31.29 KG/M2 | RESPIRATION RATE: 18 BRPM | HEIGHT: 76 IN

## 2024-06-12 LAB
ANION GAP SERPL CALC-SCNC: 7 MEQ/L
BUN SERPL-MCNC: 22.5 MG/DL (ref 8.4–25.7)
CALCIUM SERPL-MCNC: 8.9 MG/DL (ref 8.8–10)
CHLORIDE SERPL-SCNC: 105 MMOL/L (ref 98–107)
CO2 SERPL-SCNC: 21 MMOL/L (ref 23–31)
CREAT SERPL-MCNC: 0.84 MG/DL (ref 0.73–1.18)
CREAT/UREA NIT SERPL: 27
GFR SERPLBLD CREATININE-BSD FMLA CKD-EPI: >60 ML/MIN/1.73/M2
GLUCOSE SERPL-MCNC: 93 MG/DL (ref 82–115)
POTASSIUM SERPL-SCNC: 4.2 MMOL/L (ref 3.5–5.1)
SODIUM SERPL-SCNC: 133 MMOL/L (ref 136–145)

## 2024-06-12 PROCEDURE — 80048 BASIC METABOLIC PNL TOTAL CA: CPT | Performed by: ORTHOPAEDIC SURGERY

## 2024-06-12 PROCEDURE — 94799 UNLISTED PULMONARY SVC/PX: CPT

## 2024-06-12 PROCEDURE — 99900031 HC PATIENT EDUCATION (STAT)

## 2024-06-12 PROCEDURE — 25000003 PHARM REV CODE 250: Performed by: ORTHOPAEDIC SURGERY

## 2024-06-12 PROCEDURE — G0378 HOSPITAL OBSERVATION PER HR: HCPCS

## 2024-06-12 PROCEDURE — 25000003 PHARM REV CODE 250: Performed by: NURSE PRACTITIONER

## 2024-06-12 PROCEDURE — 36415 COLL VENOUS BLD VENIPUNCTURE: CPT | Performed by: ORTHOPAEDIC SURGERY

## 2024-06-12 PROCEDURE — 51798 US URINE CAPACITY MEASURE: CPT

## 2024-06-12 PROCEDURE — 97116 GAIT TRAINING THERAPY: CPT | Mod: CQ

## 2024-06-12 PROCEDURE — 97110 THERAPEUTIC EXERCISES: CPT | Mod: CQ

## 2024-06-12 PROCEDURE — 94761 N-INVAS EAR/PLS OXIMETRY MLT: CPT

## 2024-06-12 RX ORDER — HYDROCODONE BITARTRATE AND ACETAMINOPHEN 7.5; 325 MG/1; MG/1
1 TABLET ORAL EVERY 4 HOURS PRN
Status: DISCONTINUED | OUTPATIENT
Start: 2024-06-12 | End: 2024-06-12 | Stop reason: HOSPADM

## 2024-06-12 RX ORDER — CEFADROXIL 500 MG/1
500 CAPSULE ORAL EVERY 12 HOURS
Qty: 14 CAPSULE | Refills: 0 | Status: SHIPPED | OUTPATIENT
Start: 2024-06-12 | End: 2024-06-19

## 2024-06-12 RX ORDER — HYDROCODONE BITARTRATE AND ACETAMINOPHEN 7.5; 325 MG/1; MG/1
1 TABLET ORAL EVERY 4 HOURS PRN
Qty: 42 TABLET | Refills: 0 | Status: SHIPPED | OUTPATIENT
Start: 2024-06-12 | End: 2024-06-19

## 2024-06-12 RX ORDER — HYDROCODONE BITARTRATE AND ACETAMINOPHEN 5; 325 MG/1; MG/1
1 TABLET ORAL EVERY 4 HOURS PRN
Status: DISCONTINUED | OUTPATIENT
Start: 2024-06-12 | End: 2024-06-12 | Stop reason: HOSPADM

## 2024-06-12 RX ADMIN — KETOROLAC TROMETHAMINE 10 MG: 10 TABLET, FILM COATED ORAL at 12:06

## 2024-06-12 RX ADMIN — TAMSULOSIN HYDROCHLORIDE 0.4 MG: 0.4 CAPSULE ORAL at 08:06

## 2024-06-12 RX ADMIN — HYDROCODONE BITARTRATE AND ACETAMINOPHEN 1 TABLET: 7.5; 325 TABLET ORAL at 12:06

## 2024-06-12 RX ADMIN — FAMOTIDINE 20 MG: 20 TABLET, FILM COATED ORAL at 08:06

## 2024-06-12 RX ADMIN — KETOROLAC TROMETHAMINE 10 MG: 10 TABLET, FILM COATED ORAL at 05:06

## 2024-06-12 RX ADMIN — SODIUM CHLORIDE: 9 INJECTION, SOLUTION INTRAVENOUS at 12:06

## 2024-06-12 RX ADMIN — ASPIRIN 81 MG 81 MG: 81 TABLET ORAL at 08:06

## 2024-06-12 RX ADMIN — LOSARTAN POTASSIUM 100 MG: 50 TABLET, FILM COATED ORAL at 08:06

## 2024-06-12 RX ADMIN — OXYCODONE HYDROCHLORIDE AND ACETAMINOPHEN 1 TABLET: 5; 325 TABLET ORAL at 03:06

## 2024-06-12 RX ADMIN — OXYCODONE HYDROCHLORIDE AND ACETAMINOPHEN 1 TABLET: 5; 325 TABLET ORAL at 08:06

## 2024-06-12 RX ADMIN — SENNOSIDES AND DOCUSATE SODIUM 2 TABLET: 8.6; 5 TABLET ORAL at 08:06

## 2024-06-12 RX ADMIN — LEVOTHYROXINE SODIUM 50 MCG: 50 TABLET ORAL at 05:06

## 2024-06-12 RX ADMIN — METHOCARBAMOL TABLETS 750 MG: 750 TABLET, COATED ORAL at 05:06

## 2024-06-12 NOTE — DISCHARGE SUMMARY
[facility]  Orthopedic Surgery  Inpatient Discharge Summary:    Patient Name: Zachery Cordero  MRN: [id number]  Admission Date: 6/11/2024  Attending Provider:  Pablito Abad Jr., MD  Primary Care Provider: Josué Bass MD      Discharge Date/Time: No discharge date for patient encounter.   Discharge Provider: Kirti Espinal    Diagnoses:   Active Hospital Problems    Diagnosis  POA    *Primary osteoarthritis of left knee [M17.12]  Yes      Resolved Hospital Problems   No resolved problems to display.       Discharge Condition: Stable    Hospital Course: The patient was admitted for elective Left total knee arthroplasty. Patient tolerated the surgery well with no immediate postoperative complications. Patient progressed with physical therapy. Patient's pain was managed appropriately. Patient tolerated a diet. Patient is now stable and ready for discharge. The patient was given thorough discharge instructions and postoperative protocols. Patient will continue previously established pain control and DVT prophylaxis. Patient will follow up as an outpatient with Dr. Pablito Abad in 2 weeks. Follow up with Dr. Del Angel for urinary retention    Final Diagnoses: Same as principal problem.    Disposition: home      Medications:  Reconciled Home Medications:      Medication List        START taking these medications      cefadroxil 500 MG Cap  Commonly known as: DURICEF  Take 1 capsule (500 mg total) by mouth every 12 (twelve) hours. for 7 days     HYDROcodone-acetaminophen 7.5-325 mg per tablet  Commonly known as: NORCO  Take 1 tablet by mouth every 4 (four) hours as needed for Pain.            CHANGE how you take these medications      levothyroxine 50 MCG tablet  Commonly known as: SYNTHROID  TAKE 1 TABLET BY MOUTH DAILY  What changed: when to take this     simvastatin 20 MG tablet  Commonly known as: ZOCOR  TAKE 1 TABLET BY MOUTH EVERY DAY AT BEDTIME  What changed:   how much to take  how to take this  when to  take this  additional instructions            CONTINUE taking these medications      aspirin 81 MG EC tablet  Commonly known as: ECOTRIN  Take 1 tablet (81 mg total) by mouth 2 (two) times a day.     b complex vitamins tablet  Take 1 tablet by mouth once daily.     fish oil-omega-3 fatty acids 300-1,000 mg capsule  Take by mouth once daily. 4 a day     fluticasone propionate 50 mcg/actuation nasal spray  Commonly known as: FLONASE  1 spray by Each Nostril route as needed.     folic acid 1 MG tablet  Commonly known as: FOLVITE  Take 1 mg by mouth once daily.     glucosamine-chondroitin 500-400 mg tablet  Take 1 tablet by mouth 2 (two) times a day.     NON FORMULARY MEDICATION  Take 1 tablet by mouth Daily.     NON FORMULARY MEDICATION  Take 1 tablet by mouth Daily.     ofloxacin 0.3 % ophthalmic solution  Commonly known as: OCUFLOX  INSTILL 1 DROP INTO RIGHT EYE THREE TIMES DAILY     ONE DAILY MULTIVITAMIN per tablet  Generic drug: multivitamin  Take 1 tablet by mouth once daily.     prednisoLONE acetate 1 % Drps  Commonly known as: PRED FORTE  Place 1 drop into the right eye 4 (four) times daily.     saw palmetto 500 MG capsule  Take 500 mg by mouth once daily.     tadalafiL 20 MG Tab  Commonly known as: CIALIS  Take 20 mg by mouth once daily.     tamsulosin 0.4 mg Cap  Commonly known as: FLOMAX  Take 0.4 mg by mouth 2 (two) times a day.     telmisartan 80 MG Tab  Commonly known as: MICARDIS  TAKE 1 TABLET BY MOUTH DAILY     testosterone cypionate 200 mg/mL injection  Commonly known as: DEPOTESTOTERONE CYPIONATE  Inject 1 mL (200 mg total) into the muscle every 14 (fourteen) days.     VITAMIN C 500 MG tablet  Generic drug: ascorbic acid (vitamin C)  Take 500 mg by mouth once daily.     vitamin D 1000 units Tab  Commonly known as: VITAMIN D3  Take 1,000 Units by mouth once daily.     zinc gluconate 50 mg tablet  Take 50 mg by mouth once daily.            STOP taking these medications      meloxicam 15 MG  tablet  Commonly known as: MOBIC     oxyCODONE-acetaminophen 5-325 mg per tablet  Commonly known as: PERCOCET            No discharge procedures on file.   Follow-up Information       Pablito Abad Jr., MD. Go on 6/14/2024.    Specialty: Orthopedic Surgery  Why: Ortho follow up appt on Friday 06/14/24 @ 8:30am.  Contact information:  [address]  [address]  [place] [address]  [phone]               [facility] And Follow up.    Specialty: Physical Therapy  Why: This is the outpatient therapy facility. They will contact pt with appt date & time. Call if you have questions or concerns.  Contact information:  [address]  [address]  [facility] [address]  [phone]               Scott Del Angel MD. Go on 6/20/2024.    Specialty: Urology  Why: Urology follow on Thursday 6/20/24 @ 9:30am (Patient being dc'd with whelan due to urinary retention)  Contact information:  [address]  [address]  [place] [address]  [phone]

## 2024-06-12 NOTE — PT/OT/SLP PROGRESS
Physical Therapy Treatment    Patient Name:  Zachery Cordero   MRN:  [id number]    Recommendations:     Discharge Recommendations: Low Intensity Therapy  Discharge Equipment Recommendations: walker, rolling  Barriers to discharge: None    Assessment:     Zachery Cordero is a 64 y.o. male admitted with a medical diagnosis of Primary osteoarthritis of left knee.  He presents with the following impairments/functional limitations: weakness, impaired endurance, impaired functional mobility, decreased lower extremity function, pain, decreased ROM, edema, orthopedic precautions .    Rehab Prognosis: Good; patient would benefit from acute skilled PT services to address these deficits and reach maximum level of function.    Recent Surgery: Procedure(s) (LRB):  SDD- ROBOTIC ARTHROPLASTY, KNEE, TOTAL (Left) 1 Day Post-Op    Plan:     During this hospitalization, patient to be seen BID to address the identified rehab impairments via gait training, therapeutic activities, therapeutic exercises and progress toward the following goals:    Plan of Care Expires:  06/18/24    Subjective     Chief Complaint: pain  Patient/Family Comments/goals: go home  Pain/Comfort:         Objective:     Communicated with rn prior to session.  Patient found up in chair with   upon PT entry to room.     General Precautions: Standard, fall  Orthopedic Precautions: LLE weight bearing as tolerated  Braces:    Respiratory Status: Room air     Functional Mobility:  Transfers:     Sit to Stand:  supervision with rolling walker  Bed to Chair: supervision with  rolling walker  using  Step Transfer  Gait: pt amb 300ft w/rw and cga for safety. Pt amb with step through gait pattern. Pt c/o of pain with amb.       (In degrees) AROM PROM   L knee flexion 100    L knee extension 0           Treatment & Education:  Pt preformed seated ex 10x in chair.    Patient left up in chair with all lines intact and call button in reach..    GOALS:   Multidisciplinary Problems        Physical Therapy Goals          Problem: Physical Therapy    Goal Priority Disciplines Outcome Goal Variances Interventions   Physical Therapy Goal     PT, PT/OT Progressing     Description: Pt will improve functional independence by performing:    Bed mobility: SBA MET  Sit to stand: SBA with rolling walker MET  Bed to chair: SBA with Stand Step  with rolling walker MET  Car Transfer: SBA with rolling walker MET  Ambulation x 200'  feet with SBA and rolling walker MET  1 Step (Curb): Min A  and rolling walker MET  3 Steps: Min A  and B HR MET  left knee AROM flexion (in degrees): 90  left knee AROM extension (in degrees): 0 MET  Independent with total knee HEP MET                         Time Tracking:     PT Received On:    PT Start Time: 0935     PT Stop Time: 1000  PT Total Time (min): 25 min     Billable Minutes: Gait Training 15 and Therapeutic Exercise 10    Treatment Type: Treatment  PT/PTA: PTA     Number of PTA visits since last PT visit: 1 06/12/2024

## 2024-06-12 NOTE — PLAN OF CARE
Problem: Physical Therapy  Goal: Physical Therapy Goal  Description: Pt will improve functional independence by performing:    Bed mobility: SBA MET  Sit to stand: SBA with rolling walker MET  Bed to chair: SBA with Stand Step  with rolling walker MET  Car Transfer: SBA with rolling walker MET  Ambulation x 200'  feet with SBA and rolling walker MET  1 Step (Curb): Min A  and rolling walker MET  3 Steps: Min A  and B HR MET  left knee AROM flexion (in degrees): 90  left knee AROM extension (in degrees): 0 MET  Independent with total knee HEP MET    Outcome: Progressing

## 2024-06-12 NOTE — PLAN OF CARE
Problem: Adult Inpatient Plan of Care  Goal: Plan of Care Review  Outcome: Progressing  Goal: Patient-Specific Goal (Individualized)  Outcome: Progressing  Goal: Absence of Hospital-Acquired Illness or Injury  Outcome: Progressing  Goal: Optimal Comfort and Wellbeing  Outcome: Progressing  Goal: Readiness for Transition of Care  Outcome: Progressing     Problem: Infection  Goal: Absence of Infection Signs and Symptoms  Outcome: Progressing     Problem: Wound  Goal: Optimal Coping  Outcome: Progressing  Goal: Optimal Functional Ability  Outcome: Progressing  Goal: Absence of Infection Signs and Symptoms  Outcome: Progressing  Goal: Improved Oral Intake  Outcome: Progressing  Goal: Optimal Pain Control and Function  Outcome: Progressing  Goal: Skin Health and Integrity  Outcome: Progressing  Goal: Optimal Wound Healing  Outcome: Progressing     Problem: Knee Arthroplasty  Goal: Optimal Coping  Outcome: Progressing  Goal: Absence of Bleeding  Outcome: Progressing  Goal: Effective Bowel Elimination  Outcome: Progressing  Goal: Fluid and Electrolyte Balance  Outcome: Progressing  Goal: Optimal Functional Ability  Outcome: Progressing  Goal: Absence of Infection Signs and Symptoms  Outcome: Progressing  Goal: Intact Neurovascular Status  Outcome: Progressing  Goal: Anesthesia/Sedation Recovery  Outcome: Progressing  Goal: Optimal Pain Control and Function  Outcome: Progressing  Goal: Nausea and Vomiting Relief  Outcome: Progressing  Goal: Effective Urinary Elimination  Outcome: Progressing  Goal: Effective Oxygenation and Ventilation  Outcome: Progressing     Problem: Comorbidity Management  Goal: Blood Pressure in Desired Range  Outcome: Progressing

## 2024-06-12 NOTE — PLAN OF CARE
Problem: Adult Inpatient Plan of Care  Goal: Plan of Care Review  Outcome: Progressing  Flowsheets (Taken 6/12/2024 2961)  Plan of Care Reviewed With:   patient   spouse  Goal: Optimal Comfort and Wellbeing  Outcome: Progressing     Problem: Infection  Goal: Absence of Infection Signs and Symptoms  Outcome: Progressing

## 2024-06-12 NOTE — NURSING
RX: scripts given to patient/family  Supplies: Coverlet, TEDs, ACE, Nozin    Educated on post op instructions, home care, f/u, meds., therapy, wound care, complication prevention, when to seek medical attention, ect. See AVS for specifics.     Educated on whelan care at home. Provided leg bags and full drainage bag for home use.    Questions/concerns addressed. Stable and ready for discharge.     Nurse Note:       6/12/2024   1:25 PM      Perception of Care - Joint Replacement Population Total Joint Surgery List: KNEE    Patient able to verbalize one way to treat/prevent SWELLING at home   [x] Yes   [] No   [] Further Education Provided        Attending Nurse:  Kari

## 2024-06-12 NOTE — PLAN OF CARE
Problem: Adult Inpatient Plan of Care  Goal: Plan of Care Review  6/11/2024 1916 by Keysha Ross RN  Outcome: Progressing  6/11/2024 1113 by Keysha Ross RN  Outcome: Progressing  Goal: Patient-Specific Goal (Individualized)  6/11/2024 1916 by Keysha Ross RN  Outcome: Progressing  6/11/2024 1113 by Keysha Ross RN  Outcome: Progressing  Goal: Absence of Hospital-Acquired Illness or Injury  6/11/2024 1916 by Keysha Ross RN  Outcome: Progressing  6/11/2024 1113 by Keysha Ross RN  Outcome: Progressing  Goal: Optimal Comfort and Wellbeing  6/11/2024 1916 by Keysha Ross RN  Outcome: Progressing  6/11/2024 1113 by Keysha Ross RN  Outcome: Progressing  Goal: Readiness for Transition of Care  6/11/2024 1916 by Keysha Ross RN  Outcome: Progressing  6/11/2024 1113 by Keysha Ross RN  Outcome: Progressing     Problem: Infection  Goal: Absence of Infection Signs and Symptoms  6/11/2024 1916 by Keysha Ross RN  Outcome: Progressing  6/11/2024 1113 by Keysha Ross RN  Outcome: Progressing     Problem: Wound  Goal: Optimal Coping  6/11/2024 1916 by Keysha Ross RN  Outcome: Progressing  6/11/2024 1113 by Keysha Ross RN  Outcome: Progressing  Goal: Optimal Functional Ability  6/11/2024 1916 by Keysha Ross RN  Outcome: Progressing  6/11/2024 1113 by Keysha Ross RN  Outcome: Progressing  Goal: Absence of Infection Signs and Symptoms  6/11/2024 1916 by Keysha Ross RN  Outcome: Progressing  6/11/2024 1113 by Keysha Ross RN  Outcome: Progressing  Goal: Improved Oral Intake  6/11/2024 1916 by Keysha Ross RN  Outcome: Progressing  6/11/2024 1113 by Keysha Ross RN  Outcome: Progressing  Goal: Optimal Pain Control and Function  6/11/2024 1916 by Tamporello, Keysha, RN  Outcome: Progressing  6/11/2024 1113 by Keysha Ross, RN  Outcome: Progressing  Goal: Skin Health and  Integrity  6/11/2024 1916 by Keysha Ross RN  Outcome: Progressing  6/11/2024 1113 by Keysha Ross, RN  Outcome: Progressing  Goal: Optimal Wound Healing  6/11/2024 1916 by Keysha Ross RN  Outcome: Progressing  6/11/2024 1113 by Keysha Ross RN  Outcome: Progressing     Problem: Knee Arthroplasty  Goal: Optimal Coping  6/11/2024 1916 by Keysha Ross, RN  Outcome: Progressing  6/11/2024 1113 by Keysha Ross RN  Outcome: Progressing  Goal: Absence of Bleeding  6/11/2024 1916 by Keysha Ross, RN  Outcome: Progressing  6/11/2024 1113 by Keysha Ross RN  Outcome: Progressing  Goal: Effective Bowel Elimination  6/11/2024 1916 by Keysha Ross RN  Outcome: Progressing  6/11/2024 1113 by Keysha Ross, RN  Outcome: Progressing  Goal: Fluid and Electrolyte Balance  6/11/2024 1916 by Keysha Ross, RN  Outcome: Progressing  6/11/2024 1113 by Keysha Ross RN  Outcome: Progressing  Goal: Optimal Functional Ability  6/11/2024 1916 by Keysha Ross RN  Outcome: Progressing  6/11/2024 1113 by Keysha Ross RN  Outcome: Progressing  Goal: Absence of Infection Signs and Symptoms  6/11/2024 1916 by Keysha Ross, RN  Outcome: Progressing  6/11/2024 1113 by Keysha Ross RN  Outcome: Progressing  Goal: Intact Neurovascular Status  6/11/2024 1916 by Keysha Ross RN  Outcome: Progressing  6/11/2024 1113 by Keysha Ross RN  Outcome: Progressing  Goal: Anesthesia/Sedation Recovery  6/11/2024 1916 by Keysha Ross, RN  Outcome: Progressing  6/11/2024 1113 by Keysha Ross RN  Outcome: Progressing  Goal: Optimal Pain Control and Function  6/11/2024 1916 by Keysha Ross, RN  Outcome: Progressing  6/11/2024 1113 by Keysha Ross RN  Outcome: Progressing  Goal: Nausea and Vomiting Relief  6/11/2024 1916 by Keysha Ross, RN  Outcome: Progressing  6/11/2024 1113 by Keysha Ross, RN  Outcome:  Progressing  Goal: Effective Urinary Elimination  6/11/2024 1916 by Keysha Ross RN  Outcome: Progressing  6/11/2024 1113 by Keysha Ross RN  Outcome: Progressing  Goal: Effective Oxygenation and Ventilation  6/11/2024 1916 by Keysha Ross RN  Outcome: Progressing  6/11/2024 1113 by Keysha Ross RN  Outcome: Progressing

## 2024-06-13 ENCOUNTER — PATIENT OUTREACH (OUTPATIENT)
Dept: ADMINISTRATIVE | Facility: CLINIC | Age: 64
End: 2024-06-13
Payer: COMMERCIAL

## 2024-06-13 NOTE — PROGRESS NOTES
C3 nurse spoke with Zachery Cordero  for a TCC post hospital discharge follow up call. The patient has a scheduled HOSFU appointment with Dr. Pablito Abad on 06/14/2024 @ 830 am. Appointment with Dr. Scott Del Angel on 06/20/2024 @ 930 am.  The patient does not have a scheduled HOSFU appointment with Josué Bass MD  within 5-7 days post hospital discharge date 06/12/2024.    Message sent to PCP staff requesting they contact patient and schedule follow up appointment.

## 2024-06-14 ENCOUNTER — OFFICE VISIT (OUTPATIENT)
Dept: ORTHOPEDICS | Facility: CLINIC | Age: 64
End: 2024-06-14
Payer: COMMERCIAL

## 2024-06-14 VITALS
HEIGHT: 76 IN | HEART RATE: 111 BPM | SYSTOLIC BLOOD PRESSURE: 146 MMHG | DIASTOLIC BLOOD PRESSURE: 88 MMHG | WEIGHT: 257.06 LBS | BODY MASS INDEX: 31.3 KG/M2

## 2024-06-14 DIAGNOSIS — Z96.652 HISTORY OF TOTAL LEFT KNEE REPLACEMENT: Primary | ICD-10-CM

## 2024-06-14 PROCEDURE — 1159F MED LIST DOCD IN RCRD: CPT | Mod: CPTII,,, | Performed by: ORTHOPAEDIC SURGERY

## 2024-06-14 PROCEDURE — 1101F PT FALLS ASSESS-DOCD LE1/YR: CPT | Mod: CPTII,,, | Performed by: ORTHOPAEDIC SURGERY

## 2024-06-14 PROCEDURE — 3079F DIAST BP 80-89 MM HG: CPT | Mod: CPTII,,, | Performed by: ORTHOPAEDIC SURGERY

## 2024-06-14 PROCEDURE — 3288F FALL RISK ASSESSMENT DOCD: CPT | Mod: CPTII,,, | Performed by: ORTHOPAEDIC SURGERY

## 2024-06-14 PROCEDURE — 4010F ACE/ARB THERAPY RXD/TAKEN: CPT | Mod: CPTII,,, | Performed by: ORTHOPAEDIC SURGERY

## 2024-06-14 PROCEDURE — 99024 POSTOP FOLLOW-UP VISIT: CPT | Mod: ,,, | Performed by: ORTHOPAEDIC SURGERY

## 2024-06-14 PROCEDURE — 3077F SYST BP >= 140 MM HG: CPT | Mod: CPTII,,, | Performed by: ORTHOPAEDIC SURGERY

## 2024-06-14 RX ORDER — TRAMADOL HYDROCHLORIDE 50 MG/1
50 TABLET ORAL EVERY 8 HOURS PRN
Qty: 20 TABLET | Refills: 0 | Status: SHIPPED | OUTPATIENT
Start: 2024-06-14

## 2024-06-14 NOTE — PROGRESS NOTES
Chief Complaint:   Chief Complaint   Patient presents with    Post-op Evaluation     3 days since Lt Tka sx 6/11/24- Reports soreness and swelling in knee. Denies any redness. States Pt has been going well.        History of present illness:  06/11/2024: Left total knee arthroplasty     He returns today.  His pain is under good control.  His Ames is still in.  He is working in therapy.    Musculoskeletal:   Incision healing.  Distally he is neurovascularly intact.  Range of motion 10-90 degrees    Imaging:        Assessment: History of total left knee replacement        Plan:  Doing well status post above.  Continue rehab.  I will see him back in a couple weeks for wound check

## 2024-06-17 LAB — VIEW PATHOLOGY REPORT (RELIAPATH): NORMAL

## 2024-06-20 ENCOUNTER — PATIENT MESSAGE (OUTPATIENT)
Dept: ADMINISTRATIVE | Facility: OTHER | Age: 64
End: 2024-06-20
Payer: COMMERCIAL

## 2024-06-22 ENCOUNTER — PATIENT MESSAGE (OUTPATIENT)
Dept: ADMINISTRATIVE | Facility: OTHER | Age: 64
End: 2024-06-22
Payer: COMMERCIAL

## 2024-06-24 ENCOUNTER — OFFICE VISIT (OUTPATIENT)
Dept: INTERNAL MEDICINE | Facility: CLINIC | Age: 64
End: 2024-06-24
Payer: COMMERCIAL

## 2024-06-24 VITALS
RESPIRATION RATE: 16 BRPM | SYSTOLIC BLOOD PRESSURE: 140 MMHG | BODY MASS INDEX: 31.05 KG/M2 | HEART RATE: 115 BPM | OXYGEN SATURATION: 98 % | WEIGHT: 255 LBS | DIASTOLIC BLOOD PRESSURE: 86 MMHG | HEIGHT: 76 IN

## 2024-06-24 DIAGNOSIS — Z98.890 S/P ARTHROSCOPIC SURGERY OF LEFT KNEE: ICD-10-CM

## 2024-06-24 DIAGNOSIS — I10 PRIMARY HYPERTENSION: ICD-10-CM

## 2024-06-24 DIAGNOSIS — R33.9 URINARY RETENTION: ICD-10-CM

## 2024-06-24 DIAGNOSIS — M17.12 PRIMARY OSTEOARTHRITIS OF LEFT KNEE: ICD-10-CM

## 2024-06-24 DIAGNOSIS — Z09 HOSPITAL DISCHARGE FOLLOW-UP: Primary | ICD-10-CM

## 2024-06-24 DIAGNOSIS — R00.0 TACHYCARDIA: ICD-10-CM

## 2024-06-24 DIAGNOSIS — E87.1 HYPONATREMIA: ICD-10-CM

## 2024-06-24 PROBLEM — E03.9 HYPOTHYROIDISM: Status: ACTIVE | Noted: 2024-06-24

## 2024-06-24 PROBLEM — E78.5 DYSLIPIDEMIA: Status: ACTIVE | Noted: 2024-06-24

## 2024-06-24 PROBLEM — K57.30 DIVERTICULOSIS OF LARGE INTESTINE WITHOUT PERFORATION OR ABSCESS WITHOUT BLEEDING: Status: ACTIVE | Noted: 2023-06-27

## 2024-06-24 PROBLEM — R97.20 HIGH PROSTATE SPECIFIC ANTIGEN (PSA): Status: ACTIVE | Noted: 2024-06-24

## 2024-06-24 PROBLEM — Z86.0100 PERSONAL HISTORY OF COLONIC POLYPS: Status: ACTIVE | Noted: 2023-06-06

## 2024-06-24 PROBLEM — K57.30 DVRTCLOS OF LG INT W/O PERFORATION OR ABSCESS W/O BLEEDING: Status: ACTIVE | Noted: 2018-06-20

## 2024-06-24 PROBLEM — E78.5 HYPERLIPIDEMIA: Status: ACTIVE | Noted: 2023-09-13

## 2024-06-24 PROBLEM — Z80.0 FAMILY HISTORY OF COLON CANCER: Status: ACTIVE | Noted: 2018-04-06

## 2024-06-24 PROBLEM — E78.00 PURE HYPERCHOLESTEROLEMIA: Status: ACTIVE | Noted: 2024-06-24

## 2024-06-24 PROBLEM — E83.52 HYPERCALCEMIA: Status: ACTIVE | Noted: 2024-06-24

## 2024-06-24 PROBLEM — Z86.010 PERSONAL HISTORY OF COLONIC POLYPS: Status: ACTIVE | Noted: 2023-06-06

## 2024-06-24 PROBLEM — R50.9 FEVER: Status: ACTIVE | Noted: 2021-09-20

## 2024-06-24 PROCEDURE — 1159F MED LIST DOCD IN RCRD: CPT | Mod: CPTII,,, | Performed by: NURSE PRACTITIONER

## 2024-06-24 PROCEDURE — 3077F SYST BP >= 140 MM HG: CPT | Mod: CPTII,,, | Performed by: NURSE PRACTITIONER

## 2024-06-24 PROCEDURE — 3288F FALL RISK ASSESSMENT DOCD: CPT | Mod: CPTII,,, | Performed by: NURSE PRACTITIONER

## 2024-06-24 PROCEDURE — 3079F DIAST BP 80-89 MM HG: CPT | Mod: CPTII,,, | Performed by: NURSE PRACTITIONER

## 2024-06-24 PROCEDURE — 4010F ACE/ARB THERAPY RXD/TAKEN: CPT | Mod: CPTII,,, | Performed by: NURSE PRACTITIONER

## 2024-06-24 PROCEDURE — 1101F PT FALLS ASSESS-DOCD LE1/YR: CPT | Mod: CPTII,,, | Performed by: NURSE PRACTITIONER

## 2024-06-24 PROCEDURE — 99495 TRANSJ CARE MGMT MOD F2F 14D: CPT | Mod: ,,, | Performed by: NURSE PRACTITIONER

## 2024-06-24 RX ORDER — FINASTERIDE 5 MG/1
5 TABLET, FILM COATED ORAL
COMMUNITY

## 2024-06-24 NOTE — PROGRESS NOTES
Subjective:      Patient ID: Zachery Cordero is a 64 y.o. male.    Chief Complaint: Follow-up (Hospital f/u /Labs done )    Family and/or Caretaker present at visit?  Yes.  Diagnostic tests reviewed/disposition: No diagnosic tests pending after this hospitalization.  Disease/illness education: Yes  Home health/community services discussion/referrals: Patient does not have home health established from hospital visit.  They do not need home health.  If needed, we will set up home health for the patient.   Establishment or re-establishment of referral orders for community resources: No other necessary community resources.   Discussion with other health care providers: No discussion with other health care providers necessary.  I reviewed and reconciled the medications from hospital discharge.    HPI: Patient here today for hospital discharge follow up. Admitted 6/11 for elective Left Total Knee arthroplasty with watAgame robot assistance. Hospital stay uneventful with d/c noted 6/12. He saw Dr. Abad 6/14 and has been attending PT with [facility]. Some urinary retention for which he was referred to Urology. Urinary catheter present with follow up per Dr. Del Angel in near future. He is anxiously awaiting driving and activity. I did advise him that questions surrounding this would need to be deferred to surgeon.    Review of patient's allergies indicates:  No Known Allergies    Review of Systems  Constitutional: No fever, No chills, No sweats, No fatigue, weight loss.  Ear/Nose/Mouth/Throat: No nasal congestion, No vertigo.  Respiratory: No shortness of breath, No cough, No sputum production, No wheezing, No sleep apnea, No exertional dyspnea.   Cardiovascular: No chest pain, No palpitations, No claudication, No orthopnea, No peripheral edema.  Gastrointestinal: No nausea, No vomiting, No diarrhea, No rectal bleeding, No constipation, No abdominal pain.  Genitourinary: Catheter in place  Musculoskeletal: L knee pain  Integumentary: No  "rash, No ecchymosis.    Objective:   Visit Vitals  BP (!) 140/86 (BP Location: Right arm, Patient Position: Sitting, BP Method: Medium (Manual))   Pulse (!) 115   Resp 16   Ht 6' 4" (1.93 m)   Wt 115.7 kg (255 lb)   SpO2 98%   BMI 31.04 kg/m²     The patient's weight trend is below:   Wt Readings from Last 4 Encounters:   06/24/24 115.7 kg (255 lb)   06/14/24 116.6 kg (257 lb 0.9 oz)   06/11/24 116.6 kg (257 lb)   05/08/24 73.5 kg (162 lb)        Physical Exam  Vitals and nursing note reviewed.   Constitutional:       General: He is not in acute distress.     Appearance: Normal appearance. He is normal weight. He is not ill-appearing, toxic-appearing or diaphoretic.   HENT:      Head: Normocephalic and atraumatic.   Cardiovascular:      Rate and Rhythm: Normal rate.      Heart sounds: Normal heart sounds.   Pulmonary:      Effort: Pulmonary effort is normal.      Breath sounds: Normal breath sounds.   Skin:     General: Skin is warm and dry.   Neurological:      General: No focal deficit present.      Mental Status: He is alert and oriented to person, place, and time. Mental status is at baseline.   Psychiatric:         Mood and Affect: Mood normal.         Behavior: Behavior normal.         Thought Content: Thought content normal.         Judgment: Judgment normal.         Assessment/Plan:   1. Hospital discharge follow-up  Personally reviewed hospital records, diagnostic studies, and discharge summary     2. Hyponatremia  Reviewed labs from hospitalization with mild dec in Na  No dizziness or weakness  Appetite stable  Continue to monitor    3. Tachycardia  Reports feeling nervous today  Continue to monitor  Adequate hydration    4. Primary hypertension  HYPERTENSION RECOMMENDATIONS:  Continue current treatment regimen.  Dietary sodium restriction.  Regular aerobic exercise.  Weight loss.  Reduce stress.  Goal BP <130/80; Encouraged to monitor blood pressure at home      5. S/P arthroscopic surgery of left " knee  Follow up with specialist that is also caring for this problem.    6. Primary osteoarthritis of left knee  See #5    7. Urinary retention  Catheter in place  Follow up with specialist that is also caring for this problem.      Medication List with Changes/Refills   Current Medications    ASCORBIC ACID, VITAMIN C, (VITAMIN C) 500 MG TABLET    Take 500 mg by mouth once daily.    ASPIRIN (ECOTRIN) 81 MG EC TABLET    Take 1 tablet (81 mg total) by mouth 2 (two) times a day.    B COMPLEX VITAMINS TABLET    Take 1 tablet by mouth once daily.    FINASTERIDE (PROSCAR) 5 MG TABLET    Take 5 mg by mouth.    FLUTICASONE PROPIONATE (FLONASE) 50 MCG/ACTUATION NASAL SPRAY    1 spray by Each Nostril route as needed.    FOLIC ACID (FOLVITE) 1 MG TABLET    Take 1 mg by mouth once daily.    GLUCOSAMINE-CHONDROITIN 500-400 MG TABLET    Take 1 tablet by mouth 2 (two) times a day.    LEVOTHYROXINE (SYNTHROID) 50 MCG TABLET    TAKE 1 TABLET BY MOUTH DAILY    MULTIVITAMIN (ONE DAILY MULTIVITAMIN) PER TABLET    Take 1 tablet by mouth once daily.    NON FORMULARY MEDICATION    Take 1 tablet by mouth Daily.    NON FORMULARY MEDICATION    Take 1 tablet by mouth Daily. L- argintine    OFLOXACIN (OCUFLOX) 0.3 % OPHTHALMIC SOLUTION    INSTILL 1 DROP INTO RIGHT EYE THREE TIMES DAILY    OMEGA-3 FATTY ACIDS/FISH OIL (FISH OIL-OMEGA-3 FATTY ACIDS) 300-1,000 MG CAPSULE    Take by mouth once daily. 4 a day    SAW PALMETTO 500 MG CAPSULE    Take 500 mg by mouth once daily.    SIMVASTATIN (ZOCOR) 20 MG TABLET    TAKE 1 TABLET BY MOUTH EVERY DAY AT BEDTIME    TADALAFIL (CIALIS) 20 MG TAB    Take 20 mg by mouth once daily.    TAMSULOSIN (FLOMAX) 0.4 MG CAP    Take 0.4 mg by mouth 2 (two) times a day.    TELMISARTAN (MICARDIS) 80 MG TAB    TAKE 1 TABLET BY MOUTH DAILY    TESTOSTERONE CYPIONATE (DEPOTESTOTERONE CYPIONATE) 200 MG/ML INJECTION    Inject 1 mL (200 mg total) into the muscle every 14 (fourteen) days.    TRAMADOL (ULTRAM) 50 MG TABLET     "Take 1 tablet (50 mg total) by mouth every 8 (eight) hours as needed for Pain.    VITAMIN D (VITAMIN D3) 1000 UNITS TAB    Take 1,000 Units by mouth once daily.    ZINC GLUCONATE 50 MG TABLET    Take 50 mg by mouth once daily.   Discontinued Medications    PREDNISOLONE ACETATE (PRED FORTE) 1 % DRPS    Place 1 drop into the right eye 4 (four) times daily.        No follow-ups on file.    Chemistry:  Lab Results   Component Value Date     (L) 06/12/2024    K 4.2 06/12/2024    BUN 22.5 06/12/2024    CREATININE 0.84 06/12/2024    EGFRNORACEVR >60 06/12/2024    GLUCOSE 93 06/12/2024    CALCIUM 8.9 06/12/2024    ALKPHOS 52 05/15/2024    LABPROT 6.5 05/15/2024    ALBUMIN 3.8 05/15/2024    BILIDIR 0.3 03/08/2022    IBILI 0.40 03/08/2022    AST 46 (H) 05/15/2024    ALT 43 05/15/2024        No results found for: "HGBA1C", "MICROALBCREA"     Hematology:  Lab Results   Component Value Date    WBC 7.45 05/15/2024    HGB 13.7 (L) 05/15/2024    HCT 43.2 05/15/2024     05/15/2024       Lipid Panel:  Lab Results   Component Value Date    CHOL 159 02/22/2024    HDL 45 02/22/2024    LDL 69 02/20/2020    TRIG 70 02/22/2024    TOTALCHOLEST 2.7 02/20/2020        Urine:  Lab Results   Component Value Date    APPEARANCEUA Clear 05/15/2024    SGUA >=1.030 05/15/2024    PROTEINUA Negative 05/15/2024    KETONESUA Negative 05/15/2024    LEUKOCYTESUR Negative 05/15/2024    RBCUA 0-2 05/15/2024    WBCUA 6-10 (A) 05/15/2024    BACTERIA None Seen 05/15/2024        "

## 2024-06-28 ENCOUNTER — PATIENT MESSAGE (OUTPATIENT)
Dept: ADMINISTRATIVE | Facility: OTHER | Age: 64
End: 2024-06-28
Payer: COMMERCIAL

## 2024-06-28 ENCOUNTER — OFFICE VISIT (OUTPATIENT)
Dept: ORTHOPEDICS | Facility: CLINIC | Age: 64
End: 2024-06-28
Payer: COMMERCIAL

## 2024-06-28 VITALS
SYSTOLIC BLOOD PRESSURE: 129 MMHG | DIASTOLIC BLOOD PRESSURE: 93 MMHG | HEIGHT: 76 IN | HEART RATE: 105 BPM | WEIGHT: 255.06 LBS | BODY MASS INDEX: 31.06 KG/M2

## 2024-06-28 DIAGNOSIS — Z96.652 HISTORY OF TOTAL LEFT KNEE REPLACEMENT: Primary | ICD-10-CM

## 2024-06-28 NOTE — PROGRESS NOTES
Chief Complaint:   Chief Complaint   Patient presents with    Post-op Evaluation     2 wks Lt tka sx 6/11/24- Stated swelling has gone down. Stated PT has been good well other than some stiffness.        History of present illness:  06/11/2024: Left total knee arthroplasty     He returns today.  His pain is under good control.  His Ames is still in.  He is working in therapy.    Musculoskeletal:   Incision healing.  Distally he is neurovascularly intact.  Range of motion 0-90 degrees    Imaging:        Assessment: History of total left knee replacement        Plan:  Doing well status post above.  Continue rehab.  I will see him back in 4 weeks with radiographs left knee

## 2024-06-29 ENCOUNTER — HOSPITAL ENCOUNTER (EMERGENCY)
Facility: HOSPITAL | Age: 64
Discharge: HOME | End: 2024-06-29
Attending: STUDENT IN AN ORGANIZED HEALTH CARE EDUCATION/TRAINING PROGRAM
Payer: COMMERCIAL

## 2024-06-29 VITALS
WEIGHT: 255 LBS | OXYGEN SATURATION: 98 % | BODY MASS INDEX: 31.05 KG/M2 | TEMPERATURE: 99 F | SYSTOLIC BLOOD PRESSURE: 145 MMHG | HEIGHT: 76 IN | RESPIRATION RATE: 20 BRPM | HEART RATE: 99 BPM | DIASTOLIC BLOOD PRESSURE: 86 MMHG

## 2024-06-29 DIAGNOSIS — N39.0 URINARY TRACT INFECTION ASSOCIATED WITH INDWELLING URETHRAL CATHETER, INITIAL ENCOUNTER: ICD-10-CM

## 2024-06-29 DIAGNOSIS — T83.018A URINARY CATHETER DYSFUNCTION, INITIAL ENCOUNTER: Primary | ICD-10-CM

## 2024-06-29 DIAGNOSIS — T83.511A URINARY TRACT INFECTION ASSOCIATED WITH INDWELLING URETHRAL CATHETER, INITIAL ENCOUNTER: ICD-10-CM

## 2024-06-29 LAB
BACTERIA #/AREA URNS AUTO: ABNORMAL /HPF
BILIRUB UR QL STRIP.AUTO: NEGATIVE
CLARITY UR: ABNORMAL
COLOR UR AUTO: YELLOW
GLUCOSE UR QL STRIP: NEGATIVE
HGB UR QL STRIP: ABNORMAL
KETONES UR QL STRIP: NEGATIVE
LEUKOCYTE ESTERASE UR QL STRIP: ABNORMAL
NITRITE UR QL STRIP: NEGATIVE
PH UR STRIP: 8.5 [PH]
PROT UR QL STRIP: NEGATIVE
RBC #/AREA URNS AUTO: ABNORMAL /HPF
SP GR UR STRIP.AUTO: 1.01 (ref 1–1.03)
SQUAMOUS #/AREA URNS AUTO: ABNORMAL /HPF
UROBILINOGEN UR STRIP-ACNC: 0.2
WBC #/AREA URNS AUTO: ABNORMAL /HPF

## 2024-06-29 PROCEDURE — 25000003 PHARM REV CODE 250: Performed by: STUDENT IN AN ORGANIZED HEALTH CARE EDUCATION/TRAINING PROGRAM

## 2024-06-29 PROCEDURE — 81003 URINALYSIS AUTO W/O SCOPE: CPT | Performed by: STUDENT IN AN ORGANIZED HEALTH CARE EDUCATION/TRAINING PROGRAM

## 2024-06-29 PROCEDURE — 96372 THER/PROPH/DIAG INJ SC/IM: CPT | Performed by: STUDENT IN AN ORGANIZED HEALTH CARE EDUCATION/TRAINING PROGRAM

## 2024-06-29 PROCEDURE — 81001 URINALYSIS AUTO W/SCOPE: CPT | Performed by: STUDENT IN AN ORGANIZED HEALTH CARE EDUCATION/TRAINING PROGRAM

## 2024-06-29 PROCEDURE — 99284 EMERGENCY DEPT VISIT MOD MDM: CPT | Mod: 25

## 2024-06-29 PROCEDURE — 63600175 PHARM REV CODE 636 W HCPCS: Performed by: STUDENT IN AN ORGANIZED HEALTH CARE EDUCATION/TRAINING PROGRAM

## 2024-06-29 PROCEDURE — 87086 URINE CULTURE/COLONY COUNT: CPT | Performed by: STUDENT IN AN ORGANIZED HEALTH CARE EDUCATION/TRAINING PROGRAM

## 2024-06-29 RX ORDER — CEFTRIAXONE 1 G/1
1 INJECTION, POWDER, FOR SOLUTION INTRAMUSCULAR; INTRAVENOUS
Status: COMPLETED | OUTPATIENT
Start: 2024-06-29 | End: 2024-06-29

## 2024-06-29 RX ORDER — CIPROFLOXACIN 500 MG/1
500 TABLET ORAL 2 TIMES DAILY
Qty: 20 TABLET | Refills: 0 | Status: SHIPPED | OUTPATIENT
Start: 2024-06-29 | End: 2024-07-09

## 2024-06-29 RX ORDER — HYOSCYAMINE SULFATE 0.12 MG/1
0.25 TABLET SUBLINGUAL ONCE
Status: DISCONTINUED | OUTPATIENT
Start: 2024-06-29 | End: 2024-06-29

## 2024-06-29 RX ORDER — HYOSCYAMINE SULFATE 0.12 MG/1
0.25 TABLET SUBLINGUAL ONCE
Status: COMPLETED | OUTPATIENT
Start: 2024-06-29 | End: 2024-06-29

## 2024-06-29 RX ORDER — HYOSCYAMINE SULFATE 0.12 MG/1
0.12 TABLET, ORALLY DISINTEGRATING SUBLINGUAL EVERY 6 HOURS PRN
Qty: 28 TABLET | Refills: 0 | Status: SHIPPED | OUTPATIENT
Start: 2024-06-29 | End: 2024-07-06

## 2024-06-29 RX ADMIN — CEFTRIAXONE SODIUM 1 G: 1 INJECTION, POWDER, FOR SOLUTION INTRAMUSCULAR; INTRAVENOUS at 08:06

## 2024-06-29 RX ADMIN — HYOSCYAMINE SULFATE 0.25 MG: 0.12 TABLET SUBLINGUAL at 08:06

## 2024-06-30 NOTE — ED PROVIDER NOTES
Encounter Date: 6/29/2024       History     Chief Complaint   Patient presents with    Urinary Retention     Pt states has a urinary cath in place x 1 week post total knee surgery and saw urology yesterday and they had to flush his catheter -continues to have issues with urinary retention states ran fever last night 102.0     64 M Pt states has a urinary cath in place x 1 week post total knee surgery and saw urology yesterday and they had to flush his catheter -continues to have issues with urinary retention states ran fever last night 102.0F  Denies any other symptoms.  No chest pain shortness of breath abdominal pain nausea vomiting back pain or any other symptoms.        Review of patient's allergies indicates:  No Known Allergies  Past Medical History:   Diagnosis Date    Arthritis     Dry eye     ED (erectile dysfunction)     Hearing loss     wears hearing aids    HLD (hyperlipidemia)     HTN (hypertension)     Hypothyroidism, unspecified     Personal history of colonic polyps 06/20/2018    Colonoscopy Report    Unspecified sensorineural hearing loss      Past Surgical History:   Procedure Laterality Date    COLONOSCOPY  06/20/2018    COLONOSCOPY W/ POLYPECTOMY  06/27/2023    EYE SURGERY      hearing aids      KNEE ARTHROSCOPY Right     MOUTH SURGERY      salivary gland    ROBOTIC ARTHROPLASTY, KNEE Left 06/11/2024    Procedure: SDD- ROBOTIC ARTHROPLASTY, KNEE, TOTAL;  Surgeon: Pablito Abad Jr., MD;  Location: [facility];  Service: Orthopedics;  Laterality: Left;     Family History   Problem Relation Name Age of Onset    Hypertension Mother      Hypertension Father Jaren Garland Consuelo     Brain aneurysm Father Jaren Garland Consuelo     Arthritis Father Jaren Garland Consuelo      Social History     Tobacco Use    Smoking status: Some Days     Types: Cigars     Passive exposure: Yes    Smokeless tobacco: Never    Tobacco comments:     1-2 cigars a month.   Substance Use Topics    Alcohol use: Yes     Alcohol/week:  10.0 standard drinks of alcohol     Types: 10 Cans of beer per week     Comment: 1-2 a day and sometimes none    Drug use: Never     Review of Systems   Constitutional:  Positive for fever.   HENT:  Negative for sore throat.    Respiratory:  Negative for shortness of breath.    Cardiovascular:  Negative for chest pain.   Gastrointestinal:  Negative for nausea.   Genitourinary:  Negative for dysuria.        Negative except as stated   Musculoskeletal:  Negative for back pain.   Skin:  Negative for rash.   Neurological:  Negative for weakness.   Hematological:  Does not bruise/bleed easily.       Physical Exam     Initial Vitals [06/29/24 0732]   BP Pulse Resp Temp SpO2   (!) 145/86 (!) 119 (!) 22 98.6 °F (37 °C) 98 %      MAP       --         Physical Exam    Nursing note and vitals reviewed.  Constitutional: He appears well-developed and well-nourished.   HENT:   Head: Normocephalic.   Eyes: EOM are normal. Pupils are equal, round, and reactive to light.   Neck:   Normal range of motion.  Cardiovascular:  Normal rate, regular rhythm and normal pulses.           Pulmonary/Chest: Breath sounds normal. No respiratory distress.   Abdominal: Abdomen is soft. Bowel sounds are normal. There is no abdominal tenderness.   Ames catheter in place with dark urine   Musculoskeletal:         General: Normal range of motion.      Cervical back: Normal range of motion.     Neurological: He is alert and oriented to person, place, and time. He has normal strength. GCS score is 15. GCS eye subscore is 4. GCS verbal subscore is 5. GCS motor subscore is 6.   Skin: Skin is warm. Capillary refill takes less than 2 seconds.   Psychiatric: He has a normal mood and affect.         ED Course   Procedures  Labs Reviewed   URINALYSIS, REFLEX TO URINE CULTURE - Abnormal; Notable for the following components:       Result Value    Appearance, UA Cloudy (*)     Blood, UA Moderate (*)     Leukocyte Esterase, UA Large (*)     All other components  within normal limits   URINALYSIS, MICROSCOPIC - Abnormal; Notable for the following components:    Bacteria, UA Many (*)     WBC, UA 11-20 (*)     All other components within normal limits   CULTURE, URINE          Imaging Results    None          Medications   cefTRIAXone injection 1 g (1 g Intramuscular Given 6/29/24 0832)   hyoscyamine SL tablet 0.25 mg (0.25 mg Sublingual Given 6/29/24 0844)     Medical Decision Making  Differential diagnosis UTI, catheter obstruction, other issue    Amount and/or Complexity of Data Reviewed  Labs: ordered. Decision-making details documented in ED Course.  Discussion of management or test interpretation with external provider(s): Exchanged by nursing clot removed.  Posterior placement CT is well functioning no issues.  Patient with UTI given IM Rocephin and also prescribed p.o. antibiotics advised to keep follow-up with Urology on Monday July 1st.  The patient is resting comfortably in no acute distress.  Patient is hemodynamically stable and is without objective evidence for acute process requiring urgent intervention or hospitalization. I provided counseling to patient with regard to condition, the treatment plan, specific conditions for return to ER, and the importance of follow up. Detailed written and verbal instructions provided to patient/caregiver and they expressed a verbal understanding of the discharge instructions and overall management plan. Reiterated the importance of medication administration and safety and advised patient to follow up with PCP/specialist recommended in 3-5 days or sooner if needed.  Answered questions at this time. The patient is stable for discharge.       Risk  Prescription drug management.                                      Clinical Impression:  Final diagnoses:  [T83.018A] Urinary catheter dysfunction, initial encounter (Primary)  [T83.511A, N39.0] Urinary tract infection associated with indwelling urethral catheter, initial encounter           ED Disposition Condition    Discharge Stable          ED Prescriptions       Medication Sig Dispense Start Date End Date Auth. Provider    ciprofloxacin HCl (CIPRO) 500 MG tablet Take 1 tablet (500 mg total) by mouth 2 (two) times daily. for 10 days 20 tablet 6/29/2024 7/9/2024 Marcia Taylor MD    hyoscyamine (OSCIMIN SL) 0.125 mg Subl Place 1 tablet (0.125 mg total) under the tongue every 6 (six) hours as needed (bladder spasm). 28 tablet 6/29/2024 7/6/2024 Marcia Taylor MD          Follow-up Information       Follow up With Specialties Details Why Contact Info    Urology  On 7/1/2024 keep scheduled appointment              Marcia Taylor MD  06/30/24 7534

## 2024-07-01 LAB — BACTERIA UR CULT: NORMAL

## 2024-07-04 ENCOUNTER — PATIENT MESSAGE (OUTPATIENT)
Dept: ADMINISTRATIVE | Facility: OTHER | Age: 64
End: 2024-07-04
Payer: COMMERCIAL

## 2024-07-31 ENCOUNTER — OFFICE VISIT (OUTPATIENT)
Dept: ORTHOPEDICS | Facility: CLINIC | Age: 64
End: 2024-07-31
Payer: COMMERCIAL

## 2024-07-31 ENCOUNTER — HOSPITAL ENCOUNTER (OUTPATIENT)
Dept: RADIOLOGY | Facility: CLINIC | Age: 64
Discharge: HOME | End: 2024-07-31
Attending: ORTHOPAEDIC SURGERY
Payer: COMMERCIAL

## 2024-07-31 VITALS
BODY MASS INDEX: 31.06 KG/M2 | SYSTOLIC BLOOD PRESSURE: 132 MMHG | DIASTOLIC BLOOD PRESSURE: 90 MMHG | HEART RATE: 91 BPM | WEIGHT: 255.06 LBS | HEIGHT: 76 IN

## 2024-07-31 DIAGNOSIS — Z96.652 S/P TOTAL KNEE ARTHROPLASTY, LEFT: ICD-10-CM

## 2024-07-31 DIAGNOSIS — M17.12 PRIMARY OSTEOARTHRITIS OF LEFT KNEE: ICD-10-CM

## 2024-07-31 DIAGNOSIS — Z96.652 S/P TOTAL KNEE ARTHROPLASTY, LEFT: Primary | ICD-10-CM

## 2024-07-31 PROCEDURE — 73562 X-RAY EXAM OF KNEE 3: CPT | Mod: LT,,, | Performed by: ORTHOPAEDIC SURGERY

## 2024-07-31 RX ORDER — MELOXICAM 15 MG/1
15 TABLET ORAL DAILY
Qty: 30 TABLET | Refills: 1 | Status: SHIPPED | OUTPATIENT
Start: 2024-07-31

## 2024-07-31 NOTE — PROGRESS NOTES
Chief Complaint:   Chief Complaint   Patient presents with    Left Knee - Follow-up     1 month flu Lt TKA - w/ xrays - sx 6/11/24 gl 7/26/24,    Post-op Evaluation     1 month flu Lt TKA - w/ xrays - sx 6/11/24 gl 7/26/24, Doing great. Finished up with PT today. BP - elevated - pain and just left PT.        History of present illness:  06/11/2024: Left total knee arthroplasty     He returns today.  His pain is under good control.  He has finished his therapy..    Musculoskeletal:   Incision healed.  Distally he is neurovascularly intact.  Range of motion 0-120 degrees.  +effusion    Imaging:  3 radiographs left knee show appropriate implant position       Assessment: S/P total knee arthroplasty, left  -     X-Ray Knee 3 View Left; Future; Expected date: 07/31/2024    Other orders  -     meloxicam (MOBIC) 15 MG tablet; Take 1 tablet (15 mg total) by mouth once daily.  Dispense: 30 tablet; Refill: 1        Plan:  Doing well status post above.  He is going to continue his home exercise program.  I will see him back in 6 weeks for radiographs left knee

## 2024-08-02 ENCOUNTER — TELEPHONE (OUTPATIENT)
Dept: INTERNAL MEDICINE | Facility: CLINIC | Age: 64
End: 2024-08-02
Payer: COMMERCIAL

## 2024-08-02 NOTE — TELEPHONE ENCOUNTER
----- Message from Josee Dukes sent at 8/2/2024 11:39 AM CDT -----  .Type:  Patient Returning Call    Who Called:PT  Who Left Message for Patient:PT  Does the patient know what this is regarding?:Paper work  Would the patient rather a call back or a response via [facility]?   Best Call Back Number:[phone]  Additional Information: Please call back about paper work

## 2024-09-12 ENCOUNTER — TELEPHONE (OUTPATIENT)
Dept: INTERNAL MEDICINE | Facility: CLINIC | Age: 64
End: 2024-09-12
Payer: COMMERCIAL

## 2024-09-12 ENCOUNTER — OFFICE VISIT (OUTPATIENT)
Dept: ORTHOPEDICS | Facility: CLINIC | Age: 64
End: 2024-09-12
Payer: COMMERCIAL

## 2024-09-12 ENCOUNTER — HOSPITAL ENCOUNTER (OUTPATIENT)
Dept: RADIOLOGY | Facility: CLINIC | Age: 64
Discharge: HOME | End: 2024-09-12
Attending: NURSE PRACTITIONER
Payer: COMMERCIAL

## 2024-09-12 VITALS — HEART RATE: 102 BPM | OXYGEN SATURATION: 92 % | DIASTOLIC BLOOD PRESSURE: 92 MMHG | SYSTOLIC BLOOD PRESSURE: 129 MMHG

## 2024-09-12 DIAGNOSIS — Z96.652 S/P TOTAL KNEE ARTHROPLASTY, LEFT: ICD-10-CM

## 2024-09-12 DIAGNOSIS — Z96.652 S/P TOTAL KNEE ARTHROPLASTY, LEFT: Primary | ICD-10-CM

## 2024-09-12 DIAGNOSIS — R79.89 LOW TESTOSTERONE IN MALE: Primary | ICD-10-CM

## 2024-09-12 PROCEDURE — 1101F PT FALLS ASSESS-DOCD LE1/YR: CPT | Mod: CPTII,,, | Performed by: NURSE PRACTITIONER

## 2024-09-12 PROCEDURE — 1126F AMNT PAIN NOTED NONE PRSNT: CPT | Mod: CPTII,,, | Performed by: NURSE PRACTITIONER

## 2024-09-12 PROCEDURE — 73562 X-RAY EXAM OF KNEE 3: CPT | Mod: LT,,, | Performed by: NURSE PRACTITIONER

## 2024-09-12 PROCEDURE — 99213 OFFICE O/P EST LOW 20 MIN: CPT | Mod: ,,, | Performed by: NURSE PRACTITIONER

## 2024-09-12 PROCEDURE — 1159F MED LIST DOCD IN RCRD: CPT | Mod: CPTII,,, | Performed by: NURSE PRACTITIONER

## 2024-09-12 PROCEDURE — 3074F SYST BP LT 130 MM HG: CPT | Mod: CPTII,,, | Performed by: NURSE PRACTITIONER

## 2024-09-12 PROCEDURE — 3288F FALL RISK ASSESSMENT DOCD: CPT | Mod: CPTII,,, | Performed by: NURSE PRACTITIONER

## 2024-09-12 PROCEDURE — 4010F ACE/ARB THERAPY RXD/TAKEN: CPT | Mod: CPTII,,, | Performed by: NURSE PRACTITIONER

## 2024-09-12 PROCEDURE — 3080F DIAST BP >= 90 MM HG: CPT | Mod: CPTII,,, | Performed by: NURSE PRACTITIONER

## 2024-09-12 NOTE — PROGRESS NOTES
Chief Complaint:   Chief Complaint   Patient presents with    Left Knee - Follow-up     Patient is here today for follow up for  6 week flu Lt TKA .  Has been taking meds; pain: Tylenol #3 and molicam but dont take anymore. Which has as needed for pain, which does help.  Patient has also been doing home exercise which  has been helping.        Consulting Physician: No ref. provider found    History of present illness:  06/11/2024: Left total knee arthroplasty      He returns today. Overall doing well. He has some continued swelling and warmth, taking Mobic and Tylenol.     Past Medical History:   Diagnosis Date    Arthritis     Dry eye     ED (erectile dysfunction)     Hearing loss     wears hearing aids    HLD (hyperlipidemia)     HTN (hypertension)     Hypothyroidism, unspecified     Personal history of colonic polyps 06/20/2018    Colonoscopy Report    Unspecified sensorineural hearing loss        Past Surgical History:   Procedure Laterality Date    COLONOSCOPY  06/20/2018    COLONOSCOPY W/ POLYPECTOMY  06/27/2023    EYE SURGERY      hearing aids      KNEE ARTHROSCOPY Right     MOUTH SURGERY      salivary gland    ROBOTIC ARTHROPLASTY, KNEE Left 06/11/2024    Procedure: SDD- ROBOTIC ARTHROPLASTY, KNEE, TOTAL;  Surgeon: Pabliot Abad Jr., MD;  Location: [facility];  Service: Orthopedics;  Laterality: Left;       Current Outpatient Medications   Medication Sig    ascorbic acid, vitamin C, (VITAMIN C) 500 MG tablet Take 500 mg by mouth once daily.    b complex vitamins tablet Take 1 tablet by mouth once daily.    finasteride (PROSCAR) 5 mg tablet Take 5 mg by mouth.    fluticasone propionate (FLONASE) 50 mcg/actuation nasal spray 1 spray by Each Nostril route as needed.    folic acid (FOLVITE) 1 MG tablet Take 1 mg by mouth once daily.    glucosamine-chondroitin 500-400 mg tablet Take 1 tablet by mouth 2 (two) times a day.    levothyroxine (SYNTHROID) 50 MCG tablet TAKE 1 TABLET BY MOUTH DAILY (Patient taking  differently: Take 50 mcg by mouth before breakfast.)    meloxicam (MOBIC) 15 MG tablet Take 1 tablet (15 mg total) by mouth once daily.    multivitamin (ONE DAILY MULTIVITAMIN) per tablet Take 1 tablet by mouth once daily.    NON FORMULARY MEDICATION Take 1 tablet by mouth Daily. L- argintine    ofloxacin (OCUFLOX) 0.3 % ophthalmic solution INSTILL 1 DROP INTO RIGHT EYE THREE TIMES DAILY    omega-3 fatty acids/fish oil (FISH OIL-OMEGA-3 FATTY ACIDS) 300-1,000 mg capsule Take by mouth once daily. 4 a day    saw palmetto 500 MG capsule Take 500 mg by mouth once daily.    simvastatin (ZOCOR) 20 MG tablet TAKE 1 TABLET BY MOUTH EVERY DAY AT BEDTIME (Patient taking differently: 3 times a week)    tadalafiL (CIALIS) 20 MG Tab Take 20 mg by mouth once daily.    tamsulosin (FLOMAX) 0.4 mg Cap Take 0.4 mg by mouth 2 (two) times a day.    telmisartan (MICARDIS) 80 MG Tab TAKE 1 TABLET BY MOUTH DAILY    testosterone cypionate (DEPOTESTOTERONE CYPIONATE) 200 mg/mL injection Inject 1 mL (200 mg total) into the muscle every 14 (fourteen) days. (Patient taking differently: Inject 200 mg into the muscle every 7 days.)    vitamin D (VITAMIN D3) 1000 units Tab Take 1,000 Units by mouth once daily.    zinc gluconate 50 mg tablet Take 50 mg by mouth once daily.    aspirin (ECOTRIN) 81 MG EC tablet Take 1 tablet (81 mg total) by mouth 2 (two) times a day.    hyoscyamine (OSCIMIN SL) 0.125 mg Subl Place 1 tablet (0.125 mg total) under the tongue every 6 (six) hours as needed (bladder spasm).    traMADoL (ULTRAM) 50 mg tablet Take 1 tablet (50 mg total) by mouth every 8 (eight) hours as needed for Pain. (Patient not taking: Reported on 7/31/2024)     No current facility-administered medications for this visit.       Review of patient's allergies indicates:  No Known Allergies    Family History   Problem Relation Name Age of Onset    Hypertension Mother      Hypertension Father Jaren Garland Consuelo     Brain aneurysm Father Jaren Garland Consuelo      Arthritis Father Jaren Cordero        Social History     Socioeconomic History    Marital status:    Tobacco Use    Smoking status: Some Days     Types: Cigars     Passive exposure: Yes    Smokeless tobacco: Never    Tobacco comments:     1-2 cigars a month.   Substance and Sexual Activity    Alcohol use: Yes     Alcohol/week: 10.0 standard drinks of alcohol     Types: 10 Cans of beer per week     Comment: 1-2 a day and sometimes none    Drug use: Never    Sexual activity: Yes     Partners: Female     Birth control/protection: Post-menopausal     Social Determinants of Health     Financial Resource Strain: Low Risk  (9/11/2024)    Overall Financial Resource Strain (CARDIA)     Difficulty of Paying Living Expenses: Not hard at all   Food Insecurity: No Food Insecurity (9/11/2024)    Hunger Vital Sign     Worried About Running Out of Food in the Last Year: Never true     Ran Out of Food in the Last Year: Never true   Transportation Needs: No Transportation Needs (6/24/2024)    TRANSPORTATION NEEDS     Transportation : No   Physical Activity: Sufficiently Active (9/11/2024)    Exercise Vital Sign     Days of Exercise per Week: 4 days     Minutes of Exercise per Session: 50 min   Recent Concern: Physical Activity - Insufficiently Active (6/24/2024)    Exercise Vital Sign     Days of Exercise per Week: 2 days     Minutes of Exercise per Session: 10 min   Stress: No Stress Concern Present (9/11/2024)    [employer] - Occupational Stress Questionnaire     Feeling of Stress : Not at all   Housing Stability: Low Risk  (9/11/2024)    Housing Stability Vital Sign     Unable to Pay for Housing in the Last Year: No     Homeless in the Last Year: No       Review of Systems:    Constitution:   Denies chills, fever, and sweats.  HENT:   Denies headaches or blurry vision.  Cardiovascular:  Denies chest pain or irregular heart beat.  Respiratory:   Denies cough or shortness of  breath.  Gastrointestinal:  Denies abdominal pain, nausea, or vomiting.  Musculoskeletal:   Denies muscle cramps.  Neurological:   Denies dizziness or focal weakness.  Psychiatric/Behavior: Normal mental status.  Hematology/Lymph:  Denies bleeding problem or easy bruising/bleeding.  Skin:    Denies rash or suspicious lesions.    Examination:    Vital Signs:    Vitals:    09/12/24 1029   BP: (!) 129/92   Pulse: 102   SpO2: (!) 92%   PainSc: 0-No pain       There is no height or weight on file to calculate BMI.    Constitution:   Well-developed, well nourished patient in no acute distress.  Neurological:   Alert and oriented x 3 and cooperative to examination.     Psychiatric/Behavior: Normal mental status.  Respiratory:   No shortness of breath.  Cards:    Pulses palpable and symmetric, brisk cap refill   Eyes:    Extraoccular muscles intact  Skin:    No scars, rash or suspicious lesions.    MSK:   Standing exam  stance: normal alignment, no significant leg-length discrepancy  gait: normal     Knee examination  - General comments: unremarkable appearance    - Tenderness: none     Knee                  RIGHT    LEFT  Skin:                  Intact      Intact  ROM:                 0-130      0-120  Effusion:             Neg         +  MJL TTP:           Neg         Neg  LJL TTP:            Neg         Neg  David:         Neg         Neg  Pat crep:            Neg         Neg  Patella TTPs:     Neg         Neg  Patella grind:      Neg        Neg  Lachman:           Neg        Neg  Pivot shift:          Neg        Neg  Valgus stress:    Neg        Neg  Varus stress:      Neg        Neg  Posterior drawer: Neg       Neg    N-V intact intact  Hip: nml nml    Lower extremity edema:Negative       Imaging: X-rays ordered and images interpreted today personally by me of 3 views of the left knee show appropriate implant position       Assessment: S/P total knee arthroplasty, left  -     X-Ray Knee 3 View Left; Future; Expected  date: 09/12/2024        Plan:  Doing well s/p above. Continue home exercise program. Follow up in May with xrays of bilateral knees. He would like to plan right knee arthroplasty in summer 2025.

## 2024-09-12 NOTE — TELEPHONE ENCOUNTER
----- Message from Cherri Larson sent at 9/12/2024  8:12 AM CDT -----  Regarding: med advice  Who Called: Zachery Cordero    Caller is requesting assistance/information from provider's office.    Symptoms (please be specific):    How long has patient had these symptoms:    List of preferred pharmacies on file (remove unneeded): [unfilled]  If different, enter pharmacy into here including location and phone number:       Preferred Method of Contact: Phone Call  Patient's Preferred Phone Number on File: [phone]   Best Call Back Number, if different:  Additional Information: pt is requesting to have lab orders printed, says he'll pass by to pick it up

## 2024-09-16 ENCOUNTER — DOCUMENTATION ONLY (OUTPATIENT)
Dept: INTERNAL MEDICINE | Facility: CLINIC | Age: 64
End: 2024-09-16

## 2024-09-16 ENCOUNTER — OFFICE VISIT (OUTPATIENT)
Dept: INTERNAL MEDICINE | Facility: CLINIC | Age: 64
End: 2024-09-16
Payer: COMMERCIAL

## 2024-09-16 VITALS
WEIGHT: 261.38 LBS | DIASTOLIC BLOOD PRESSURE: 84 MMHG | HEART RATE: 113 BPM | BODY MASS INDEX: 31.83 KG/M2 | RESPIRATION RATE: 18 BRPM | OXYGEN SATURATION: 96 % | SYSTOLIC BLOOD PRESSURE: 122 MMHG | HEIGHT: 76 IN

## 2024-09-16 DIAGNOSIS — R79.89 LOW TESTOSTERONE IN MALE: ICD-10-CM

## 2024-09-16 DIAGNOSIS — E06.3 HYPOTHYROIDISM DUE TO HASHIMOTO'S THYROIDITIS: ICD-10-CM

## 2024-09-16 DIAGNOSIS — R97.20 HIGH PROSTATE SPECIFIC ANTIGEN (PSA): ICD-10-CM

## 2024-09-16 DIAGNOSIS — E78.2 MIXED HYPERLIPIDEMIA: ICD-10-CM

## 2024-09-16 DIAGNOSIS — E03.8 HYPOTHYROIDISM DUE TO HASHIMOTO'S THYROIDITIS: ICD-10-CM

## 2024-09-16 DIAGNOSIS — I10 PRIMARY HYPERTENSION: Primary | ICD-10-CM

## 2024-09-16 PROBLEM — M17.10 ARTHROPATHY OF KNEE: Status: RESOLVED | Noted: 2024-09-16 | Resolved: 2024-09-16

## 2024-09-16 PROBLEM — R50.9 FEVER: Status: RESOLVED | Noted: 2021-09-20 | Resolved: 2024-09-16

## 2024-09-16 PROBLEM — Z09 HOSPITAL DISCHARGE FOLLOW-UP: Status: RESOLVED | Noted: 2024-06-24 | Resolved: 2024-09-16

## 2024-09-16 PROBLEM — D12.2 BENIGN NEOPLASM OF ASCENDING COLON: Status: ACTIVE | Noted: 2018-06-20

## 2024-09-16 PROCEDURE — 3008F BODY MASS INDEX DOCD: CPT | Mod: CPTII,,, | Performed by: NURSE PRACTITIONER

## 2024-09-16 PROCEDURE — 3074F SYST BP LT 130 MM HG: CPT | Mod: CPTII,,, | Performed by: NURSE PRACTITIONER

## 2024-09-16 PROCEDURE — 3079F DIAST BP 80-89 MM HG: CPT | Mod: CPTII,,, | Performed by: NURSE PRACTITIONER

## 2024-09-16 PROCEDURE — 1159F MED LIST DOCD IN RCRD: CPT | Mod: CPTII,,, | Performed by: NURSE PRACTITIONER

## 2024-09-16 PROCEDURE — 1160F RVW MEDS BY RX/DR IN RCRD: CPT | Mod: CPTII,,, | Performed by: NURSE PRACTITIONER

## 2024-09-16 PROCEDURE — 1126F AMNT PAIN NOTED NONE PRSNT: CPT | Mod: CPTII,,, | Performed by: NURSE PRACTITIONER

## 2024-09-16 PROCEDURE — 3288F FALL RISK ASSESSMENT DOCD: CPT | Mod: CPTII,,, | Performed by: NURSE PRACTITIONER

## 2024-09-16 PROCEDURE — 99214 OFFICE O/P EST MOD 30 MIN: CPT | Mod: ,,, | Performed by: NURSE PRACTITIONER

## 2024-09-16 PROCEDURE — 4010F ACE/ARB THERAPY RXD/TAKEN: CPT | Mod: CPTII,,, | Performed by: NURSE PRACTITIONER

## 2024-09-16 PROCEDURE — 1101F PT FALLS ASSESS-DOCD LE1/YR: CPT | Mod: CPTII,,, | Performed by: NURSE PRACTITIONER

## 2024-09-16 NOTE — ASSESSMENT & PLAN NOTE
Most recent testosterone level 1588.    Continue testosterone cypionate 200 mg weekly   Repeat testosterone with next wellness labs.

## 2024-09-16 NOTE — PROGRESS NOTES
Internal Medicine    Patient Name:  Zachery Cordero   Patient ID: [id number]     Chief Complaint: Hypertension (Patient is here for a 6 month htn follow up)      HPI:     Zachery Cordero is a 64 y.o. male, known to Dr Bass, is here today for 6-month follow-up.  Labs reviewed and discussed with the patient.  Overall doing well with no acute issues or concerns.  Patient reports he had left knee surgery in June and has worked with physical therapy.  He also reports possible plans for prostate procedure later this month.  States he is having trickling urination and increased episodes of urinating at night.  Established with Urology.    Last AWV: 2/29/24      Past Medical History:   Diagnosis Date    Arthritis     Dry eye     ED (erectile dysfunction)     Hearing loss     wears hearing aids    HLD (hyperlipidemia)     HTN (hypertension)     Hypothyroidism, unspecified     Personal history of colonic polyps 06/20/2018    Colonoscopy Report    Unspecified sensorineural hearing loss         Past Surgical History:   Procedure Laterality Date    COLONOSCOPY  06/20/2018    COLONOSCOPY W/ POLYPECTOMY  06/27/2023    EYE SURGERY      hearing aids      KNEE ARTHROSCOPY Right     MOUTH SURGERY      salivary gland    ROBOTIC ARTHROPLASTY, KNEE Left 06/11/2024    Procedure: SDD- ROBOTIC ARTHROPLASTY, KNEE, TOTAL;  Surgeon: Pablito Abad Jr., MD;  Location: [facility];  Service: Orthopedics;  Laterality: Left;        Social History     Tobacco Use    Smoking status: Some Days     Types: Cigars     Passive exposure: Yes    Smokeless tobacco: Never    Tobacco comments:     1-2 cigars a month.   Substance and Sexual Activity    Alcohol use: Yes     Alcohol/week: 10.0 standard drinks of alcohol     Types: 10 Cans of beer per week     Comment: 1-2 a day and sometimes none    Drug use: Never    Sexual activity: Yes     Partners: Female     Birth control/protection: Post-menopausal        Current Outpatient Medications   Medication  Instructions    ascorbic acid (vitamin C) (VITAMIN C) 500 mg, Oral, Daily    b complex vitamins tablet 1 tablet, Oral, Daily    finasteride (PROSCAR) 5 mg, Oral    fluticasone propionate (FLONASE) 50 mcg/actuation nasal spray 1 spray, Each Nostril, As needed (PRN)    folic acid (FOLVITE) 1 mg, Oral, Daily    glucosamine-chondroitin 500-400 mg tablet 1 tablet, Oral, 2 times daily    levothyroxine (SYNTHROID) 50 MCG tablet TAKE 1 TABLET BY MOUTH DAILY    meloxicam (MOBIC) 15 mg, Oral, Daily    multivitamin (ONE DAILY MULTIVITAMIN) per tablet 1 tablet, Oral, Daily    NON FORMULARY MEDICATION 1 tablet, Oral, Daily, L- argintine     ofloxacin (OCUFLOX) 0.3 % ophthalmic solution INSTILL 1 DROP INTO RIGHT EYE THREE TIMES DAILY    omega-3 fatty acids/fish oil (FISH OIL-OMEGA-3 FATTY ACIDS) 300-1,000 mg capsule Oral, Daily, 4 a day    saw palmetto 500 mg, Oral, Daily    simvastatin (ZOCOR) 20 MG tablet TAKE 1 TABLET BY MOUTH EVERY DAY AT BEDTIME    tadalafiL (CIALIS) 20 mg, Oral, Daily    tamsulosin (FLOMAX) 0.4 mg, Oral, 2 times daily    telmisartan (MICARDIS) 80 MG Tab TAKE 1 TABLET BY MOUTH DAILY    testosterone cypionate (DEPOTESTOTERONE CYPIONATE) 200 mg, Intramuscular, Every 14 days    vitamin D (VITAMIN D3) 1,000 Units, Oral, Daily    zinc gluconate 50 mg, Oral, Daily       Review of patient's allergies indicates:  No Known Allergies     Patient Care Team:  Josué Bass MD as PCP - General (Internal Medicine)     Subjective:     Review of Systems   Constitutional:  Negative for appetite change, chills, diaphoresis and fever.   HENT:  Negative for ear pain, sinus pain and sore throat.    Eyes:  Negative for pain and visual disturbance.   Respiratory:  Negative for cough, chest tightness, shortness of breath and wheezing.    Cardiovascular:  Negative for chest pain, palpitations and leg swelling.   Gastrointestinal:  Negative for abdominal pain, constipation, diarrhea, nausea and vomiting.   Endocrine: Negative for  "cold intolerance and heat intolerance.   Genitourinary:  Positive for frequency. Negative for difficulty urinating, dysuria and hematuria.   Musculoskeletal:  Negative for arthralgias and myalgias.   Skin:  Negative for color change and rash.   Allergic/Immunologic: Negative.    Neurological: Negative.  Negative for dizziness, syncope, light-headedness and numbness.   Hematological: Negative.  Does not bruise/bleed easily.   Psychiatric/Behavioral: Negative.  Negative for dysphoric mood. The patient is not nervous/anxious.    All other systems reviewed and are negative.      Objective:     Visit Vitals  /84 (BP Location: Left arm, Patient Position: Sitting, BP Method: Large (Manual))   Pulse (!) 113   Resp 18   Ht 6' 4" (1.93 m)   Wt 118.6 kg (261 lb 6.4 oz)   SpO2 96%   BMI 31.82 kg/m²       Physical Exam  Vitals and nursing note reviewed.   Constitutional:       General: He is not in acute distress.     Appearance: He is obese. He is not ill-appearing.   HENT:      Head: Normocephalic and atraumatic.      Mouth/Throat:      Mouth: Mucous membranes are moist.      Pharynx: Oropharynx is clear.   Eyes:      General: No scleral icterus.     Extraocular Movements: Extraocular movements intact.      Conjunctiva/sclera: Conjunctivae normal.      Pupils: Pupils are equal, round, and reactive to light.   Neck:      Vascular: No carotid bruit.   Cardiovascular:      Rate and Rhythm: Normal rate and regular rhythm.      Heart sounds: Normal heart sounds. No murmur heard.     No friction rub. No gallop.   Pulmonary:      Effort: Pulmonary effort is normal. No respiratory distress.      Breath sounds: Normal breath sounds. No wheezing, rhonchi or rales.   Abdominal:      General: Bowel sounds are normal. There is no distension.      Palpations: Abdomen is soft. There is no mass.      Tenderness: There is no abdominal tenderness.   Musculoskeletal:         General: Normal range of motion.      Cervical back: Normal range " of motion and neck supple.   Lymphadenopathy:      Cervical: No cervical adenopathy.   Skin:     General: Skin is warm and dry.      Capillary Refill: Capillary refill takes less than 2 seconds.   Neurological:      General: No focal deficit present.      Mental Status: He is alert and oriented to person, place, and time.   Psychiatric:         Mood and Affect: Mood normal.         Behavior: Behavior normal.         Labs Reviewed:     Labs drawn at clinical pathology laboratories on 09/12/2024.  Scanned results reviewed with the patient.      Chemistry:  Lab Results   Component Value Date     (L) 06/12/2024    K 4.2 06/12/2024    BUN 22.5 06/12/2024    CREATININE 0.84 06/12/2024    EGFRNORACEVR >60 06/12/2024    GLUCOSE 93 06/12/2024    CALCIUM 8.9 06/12/2024    ALKPHOS 52 05/15/2024    LABPROT 6.5 05/15/2024    ALBUMIN 3.8 05/15/2024    BILIDIR 0.3 03/08/2022    IBILI 0.40 03/08/2022    AST 46 (H) 05/15/2024    ALT 43 05/15/2024    TSH 2.610 02/20/2020    PSA 33.90 (H) 02/20/2020        Hematology:  Lab Results   Component Value Date    WBC 7.45 05/15/2024    HGB 13.7 (L) 05/15/2024    HCT 43.2 05/15/2024     05/15/2024       Lipid Panel:  Lab Results   Component Value Date    CHOL 159 02/22/2024    HDL 45 02/22/2024    LDL 69 02/20/2020    TRIG 70 02/22/2024    TOTALCHOLEST 2.7 02/20/2020        Urine:  Lab Results   Component Value Date    APPEARANCEUA Cloudy (A) 06/29/2024    SGUA 1.015 06/29/2024    PROTEINUA Negative 06/29/2024    KETONESUA Negative 06/29/2024    LEUKOCYTESUR Large (A) 06/29/2024    RBCUA 0-5 06/29/2024    WBCUA 11-20 (A) 06/29/2024    BACTERIA Many (A) 06/29/2024        Assessment:       ICD-10-CM ICD-9-CM   1. Primary hypertension  I10 401.9   2. Mixed hyperlipidemia  E78.2 272.2   3. High prostate specific antigen (PSA)  R97.20 790.93   4. Low testosterone in male  R79.89 790.99   5. Hypothyroidism due to Hashimoto's thyroiditis  E03.8 244.8    E06.3 245.2        Plan:     1.  Primary hypertension  Assessment & Plan:  Well-controlled   Currently on telmisartan 80 mg daily, continue   Encouraged low-sodium diet      2. Mixed hyperlipidemia  Assessment & Plan:  Continue simvastatin 20 mg 3 times a week   Repeat lipid panel with next wellness labs.      3. High prostate specific antigen (PSA)  Assessment & Plan:  Followed by urology  Reports upcoming prostate procedure later this month      4. Low testosterone in male  Assessment & Plan:  Most recent testosterone level 1588.    Continue testosterone cypionate 200 mg weekly   Repeat testosterone with next wellness labs.      5. Hypothyroidism due to Hashimoto's thyroiditis  Assessment & Plan:  Continue Synthroid 50 mcg daily   Repeat TSH with next wellness labs.              Follow up for Previously scheduled and PRN if need. In addition to their scheduled follow up, the patient has also been instructed to follow up on as needed basis.       Future Appointments   Date Time Provider Department Center   3/5/2025 11:00 AM Josué Bass MD [id number]   5/7/2025  2:00 PM Pablito Abad Jr., MD [facility] [place]          ARINA De La Cruz

## 2024-09-18 ENCOUNTER — TELEPHONE (OUTPATIENT)
Dept: ORTHOPEDICS | Facility: CLINIC | Age: 64
End: 2024-09-18
Payer: COMMERCIAL

## 2024-09-18 NOTE — TELEPHONE ENCOUNTER
Patient called and left a VM regarding postponing a surgery. However, I attempted to call patient to get a clarification on that and he did not answer.     Awaiting patient call at this time.

## 2024-09-19 NOTE — TELEPHONE ENCOUNTER
Patient called and is requesting to possibly have a R TKA around the week of  Dec 18. When he came in for his last follow up. He was informed that he was doing well that he didn't have to wait till 6mths from his L TKA.     Patient was informed that we would have to discuss this with Dr. Abad. Would there possibly be a date around Dec

## 2024-09-22 DIAGNOSIS — E06.3 HYPOTHYROIDISM DUE TO HASHIMOTO'S THYROIDITIS: ICD-10-CM

## 2024-09-22 DIAGNOSIS — E03.8 HYPOTHYROIDISM DUE TO HASHIMOTO'S THYROIDITIS: ICD-10-CM

## 2024-09-23 RX ORDER — LEVOTHYROXINE SODIUM 50 UG/1
TABLET ORAL
Qty: 90 TABLET | Refills: 3 | Status: SHIPPED | OUTPATIENT
Start: 2024-09-23

## 2024-10-08 DIAGNOSIS — Z96.652 S/P TOTAL KNEE ARTHROPLASTY, LEFT: Primary | ICD-10-CM

## 2024-10-08 RX ORDER — MELOXICAM 15 MG/1
15 TABLET ORAL DAILY
Qty: 30 TABLET | Refills: 1 | Status: SHIPPED | OUTPATIENT
Start: 2024-10-08

## 2024-11-06 ENCOUNTER — HOSPITAL ENCOUNTER (OUTPATIENT)
Dept: RADIOLOGY | Facility: CLINIC | Age: 64
Discharge: HOME | End: 2024-11-06
Attending: ORTHOPAEDIC SURGERY
Payer: COMMERCIAL

## 2024-11-06 ENCOUNTER — OFFICE VISIT (OUTPATIENT)
Dept: ORTHOPEDICS | Facility: CLINIC | Age: 64
End: 2024-11-06
Payer: COMMERCIAL

## 2024-11-06 VITALS
DIASTOLIC BLOOD PRESSURE: 101 MMHG | HEIGHT: 76 IN | BODY MASS INDEX: 31.84 KG/M2 | WEIGHT: 261.44 LBS | SYSTOLIC BLOOD PRESSURE: 144 MMHG | HEART RATE: 91 BPM

## 2024-11-06 DIAGNOSIS — M25.561 RIGHT KNEE PAIN, UNSPECIFIED CHRONICITY: ICD-10-CM

## 2024-11-06 DIAGNOSIS — M17.11 PRIMARY OSTEOARTHRITIS OF RIGHT KNEE: Primary | ICD-10-CM

## 2024-11-06 PROCEDURE — 99214 OFFICE O/P EST MOD 30 MIN: CPT | Mod: ,,, | Performed by: ORTHOPAEDIC SURGERY

## 2024-11-06 PROCEDURE — 3077F SYST BP >= 140 MM HG: CPT | Mod: CPTII,,, | Performed by: ORTHOPAEDIC SURGERY

## 2024-11-06 PROCEDURE — 4010F ACE/ARB THERAPY RXD/TAKEN: CPT | Mod: CPTII,,, | Performed by: ORTHOPAEDIC SURGERY

## 2024-11-06 PROCEDURE — 3008F BODY MASS INDEX DOCD: CPT | Mod: CPTII,,, | Performed by: ORTHOPAEDIC SURGERY

## 2024-11-06 PROCEDURE — 1159F MED LIST DOCD IN RCRD: CPT | Mod: CPTII,,, | Performed by: ORTHOPAEDIC SURGERY

## 2024-11-06 PROCEDURE — 73564 X-RAY EXAM KNEE 4 OR MORE: CPT | Mod: RT,,, | Performed by: ORTHOPAEDIC SURGERY

## 2024-11-06 PROCEDURE — 1101F PT FALLS ASSESS-DOCD LE1/YR: CPT | Mod: CPTII,,, | Performed by: ORTHOPAEDIC SURGERY

## 2024-11-06 PROCEDURE — 3288F FALL RISK ASSESSMENT DOCD: CPT | Mod: CPTII,,, | Performed by: ORTHOPAEDIC SURGERY

## 2024-11-06 PROCEDURE — 3080F DIAST BP >= 90 MM HG: CPT | Mod: CPTII,,, | Performed by: ORTHOPAEDIC SURGERY

## 2024-11-06 RX ORDER — SCOLOPAMINE TRANSDERMAL SYSTEM 1 MG/1
1 PATCH, EXTENDED RELEASE TRANSDERMAL ONCE AS NEEDED
OUTPATIENT
Start: 2024-11-06 | End: 2036-04-04

## 2024-11-06 RX ORDER — ACETAMINOPHEN 500 MG
1000 TABLET ORAL
OUTPATIENT
Start: 2024-11-06

## 2024-11-06 RX ORDER — ONDANSETRON 4 MG/1
4 TABLET, ORALLY DISINTEGRATING ORAL
OUTPATIENT
Start: 2024-11-06

## 2024-11-06 RX ORDER — GABAPENTIN 100 MG/1
300 CAPSULE ORAL
OUTPATIENT
Start: 2024-11-06

## 2024-11-06 RX ORDER — TRANEXAMIC ACID 650 MG/1
1950 TABLET ORAL
OUTPATIENT
Start: 2024-11-06 | End: 2024-11-06

## 2024-11-06 RX ORDER — SODIUM CHLORIDE, SODIUM GLUCONATE, SODIUM ACETATE, POTASSIUM CHLORIDE AND MAGNESIUM CHLORIDE 30; 37; 368; 526; 502 MG/100ML; MG/100ML; MG/100ML; MG/100ML; MG/100ML
INJECTION, SOLUTION INTRAVENOUS CONTINUOUS
OUTPATIENT
Start: 2024-11-06

## 2024-11-06 RX ORDER — KETOROLAC TROMETHAMINE 10 MG/1
10 TABLET, FILM COATED ORAL
OUTPATIENT
Start: 2024-11-06 | End: 2024-11-06

## 2024-11-06 NOTE — PROGRESS NOTES
Chief Complaint:   Chief Complaint   Patient presents with    Follow-up     Lt tka sx 6/11/24 & rt knee pain- Stated would like to discuss sx on rt knee. Reports minimal pain with lt knee and is taking mobic which does help with some of the pain. Denies any swelling.        Consulting Physician: No ref. provider found    History of present illness:    06/11/2024: Left total knee arthroplasty      He returns today.  His left knee is doing great.  He does have some continued pain over the right knee.  The pain is located mainly medial.  He knows it worse with activity.  It is somewhat better with rest.  He has tried formal physical therapy and Mobic in the past.  He had a osteochondral allograft transfer in 2019 to his medial femoral condyle.    Past Medical History:   Diagnosis Date    Arthritis     Dry eye     ED (erectile dysfunction)     Hearing loss     wears hearing aids    HLD (hyperlipidemia)     HTN (hypertension)     Hypothyroidism, unspecified     Personal history of colonic polyps 06/20/2018    Colonoscopy Report    Unspecified sensorineural hearing loss        Past Surgical History:   Procedure Laterality Date    COLONOSCOPY  06/20/2018    COLONOSCOPY W/ POLYPECTOMY  06/27/2023    EYE SURGERY      hearing aids      KNEE ARTHROSCOPY Right     MOUTH SURGERY      salivary gland    ROBOTIC ARTHROPLASTY, KNEE Left 06/11/2024    Procedure: SDD- ROBOTIC ARTHROPLASTY, KNEE, TOTAL;  Surgeon: Pablito Abad Jr., MD;  Location: [facility];  Service: Orthopedics;  Laterality: Left;       Current Outpatient Medications   Medication Sig    ascorbic acid, vitamin C, (VITAMIN C) 500 MG tablet Take 500 mg by mouth once daily.    b complex vitamins tablet Take 1 tablet by mouth once daily.    finasteride (PROSCAR) 5 mg tablet Take 5 mg by mouth.    folic acid (FOLVITE) 1 MG tablet Take 1 mg by mouth once daily.    glucosamine-chondroitin 500-400 mg tablet Take 1 tablet by mouth 2 (two) times a day.    levothyroxine  (SYNTHROID) 50 MCG tablet TAKE 1 TABLET BY MOUTH DAILY    meloxicam (MOBIC) 15 MG tablet Take 1 tablet (15 mg total) by mouth once daily.    multivitamin (ONE DAILY MULTIVITAMIN) per tablet Take 1 tablet by mouth once daily.    NON FORMULARY MEDICATION Take 1 tablet by mouth Daily. L- argintine    ofloxacin (OCUFLOX) 0.3 % ophthalmic solution INSTILL 1 DROP INTO RIGHT EYE THREE TIMES DAILY    omega-3 fatty acids/fish oil (FISH OIL-OMEGA-3 FATTY ACIDS) 300-1,000 mg capsule Take by mouth once daily. 4 a day    saw palmetto 500 MG capsule Take 500 mg by mouth once daily.    simvastatin (ZOCOR) 20 MG tablet TAKE 1 TABLET BY MOUTH EVERY DAY AT BEDTIME (Patient taking differently: 3 times a week)    tadalafiL (CIALIS) 20 MG Tab Take 20 mg by mouth once daily.    tamsulosin (FLOMAX) 0.4 mg Cap Take 0.4 mg by mouth 2 (two) times a day.    telmisartan (MICARDIS) 80 MG Tab TAKE 1 TABLET BY MOUTH DAILY    testosterone cypionate (DEPOTESTOTERONE CYPIONATE) 200 mg/mL injection Inject 1 mL (200 mg total) into the muscle every 14 (fourteen) days. (Patient taking differently: Inject 200 mg into the muscle every 7 days.)    vitamin D (VITAMIN D3) 1000 units Tab Take 1,000 Units by mouth once daily.    zinc gluconate 50 mg tablet Take 50 mg by mouth once daily.    fluticasone propionate (FLONASE) 50 mcg/actuation nasal spray 1 spray by Each Nostril route as needed.     No current facility-administered medications for this visit.       Review of patient's allergies indicates:  No Known Allergies    Family History   Problem Relation Name Age of Onset    Hypertension Mother      Hypertension Father Jaren Moseleyrodrigue     Brain aneurysm Father Jaren Cordero     Arthritis Father Jaren Moseleyrodrigue        Social History     Socioeconomic History    Marital status:    Tobacco Use    Smoking status: Some Days     Types: Cigars     Passive exposure: Yes    Smokeless tobacco: Never    Tobacco comments:     1-2 cigars a month.    Substance and Sexual Activity    Alcohol use: Yes     Alcohol/week: 10.0 standard drinks of alcohol     Types: 10 Cans of beer per week     Comment: 1-2 a day and sometimes none    Drug use: Never    Sexual activity: Yes     Partners: Female     Birth control/protection: Post-menopausal     Social Drivers of Health     Financial Resource Strain: Low Risk  (9/11/2024)    Overall Financial Resource Strain (CARDIA)     Difficulty of Paying Living Expenses: Not hard at all   Food Insecurity: No Food Insecurity (9/11/2024)    Hunger Vital Sign     Worried About Running Out of Food in the Last Year: Never true     Ran Out of Food in the Last Year: Never true   Transportation Needs: No Transportation Needs (6/24/2024)    TRANSPORTATION NEEDS     Transportation : No   Physical Activity: Sufficiently Active (9/11/2024)    Exercise Vital Sign     Days of Exercise per Week: 4 days     Minutes of Exercise per Session: 50 min   Recent Concern: Physical Activity - Insufficiently Active (6/24/2024)    Exercise Vital Sign     Days of Exercise per Week: 2 days     Minutes of Exercise per Session: 10 min   Stress: No Stress Concern Present (9/11/2024)    [employer] [place] - Occupational Stress Questionnaire     Feeling of Stress : Not at all   Housing Stability: Low Risk  (9/11/2024)    Housing Stability Vital Sign     Unable to Pay for Housing in the Last Year: No     Homeless in the Last Year: No       Review of Systems:    Constitution:   Denies chills, fever, and sweats.  HENT:   Denies headaches or blurry vision.  Cardiovascular:  Denies chest pain or irregular heart beat.  Respiratory:   Denies cough or shortness of breath.  Gastrointestinal:  Denies abdominal pain, nausea, or vomiting.  Musculoskeletal:   Denies muscle cramps.  Neurological:   Denies dizziness or focal weakness.  Psychiatric/Behavior: Normal mental status.  Hematology/Lymph:  Denies bleeding problem or easy bruising/bleeding.  Skin:  "   Denies rash or suspicious lesions.    Examination:    Vital Signs:    Vitals:    11/06/24 1600   BP: (!) 144/101   Pulse: 91   Weight: 118.6 kg (261 lb 7.5 oz)   Height: 6' 4" (1.93 m)       Body mass index is 31.83 kg/m².    Constitution:   Well-developed, well nourished patient in no acute distress.  Neurological:   Alert and oriented x 3 and cooperative to examination.     Psychiatric/Behavior: Normal mental status.  Respiratory:   No shortness of breath.  Cards:   Pulses palpable, brisk cap refill  Eyes:    Extraoccular muscles intact  Skin:    No scars, rash or suspicious lesions.    MSK:   Standing exam  stance:  Correctable varus medially alignment, no significant leg-length discrepancy  gait:  Mild antalgic    Knee examination  - General comments: unremarkable appearance    - Tenderness:  Right medial    Knee                  RIGHT    LEFT  Skin:                  Intact      Intact  ROM:                 0-120      0-130  Effusion:              +           Neg  MJL TTP:             +         Neg  LJL TTP:            Neg         Neg  David:           +         Neg  Pat crep:            Neg         Neg  Patella TTPs:     Neg         Neg  Patella grind:      Neg        Neg  Lachman:           Neg        Neg  Pivot shift:          Neg        Neg  Valgus stress:    Neg        Neg  Varus stress:      Neg        Neg  Posterior drawer: Neg       Neg    N-V intact intact  Hip: nml nml    Lower extremity edema:Negative     Imaging: X-rays ordered and images interpreted today personally by me of four views of the right knee advanced knee osteoarthritis.  Four views left knee show appropriate implant position   Assessment: Primary osteoarthritis of right knee  -     CT Knee w/o Contrast Right w/Willie Protocol; Future; Expected date: 11/06/2024  -     Vital signs; Standing  -     Insert peripheral IV; Standing  -     Clip and Prep Other (please specifiy) (Operative site); Standing  -     Cleanse with Chlorhexidine " (CHG); Standing  -     Apply Nozin; Standing  -     Diet NPO; Standing  -     POCT glucose; Standing  -     CBC auto differential; Future  -     Comprehensive metabolic panel; Future  -     Urinalysis; Future  -     Hemoglobin A1c; Future  -     X-Ray Chest PA And Lateral; Future; Expected date: 11/06/2024  -     EKG 12-lead; Future  -     Inpatient consult to Anesthesiology; Standing  -     Case Request Operating Room: SDD - ROBOTIC ARTHROPLASTY, KNEE, TOTAL  -     Place in Outpatient; Standing  -     Place sequential compression device; Standing  -     MRSA PCR; Standing    Right knee pain, unspecified chronicity  -     X-Ray Knee Complete 4 Or More Views Right; Future; Expected date: 11/06/2024    Other orders  -     electrolyte-A infusion  -     IP VTE LOW RISK PATIENT; Standing  -     ceFAZolin (Ancef) 2 g in D5W 50 mL IVPB  -     acetaminophen tablet 1,000 mg  -     ketorolac tablet 10 mg  -     gabapentin capsule 300 mg  -     ondansetron disintegrating tablet 4 mg  -     scopolamine 1.3-1.5 mg (1 mg over 3 days) 1 patch  -     tranexamic acid (LYSTEDA) tablet 1,950 mg        Plan:  We are going to get a CT scan to better characterize his femoral deformity     I have recommended surgical intervention: Right total knee arthroplasty with Willie robot assistance.  We discussed the details of the procedure and expected postoperative course.  We discussed the benefits of surgery which would be to decrease his pain and increase his function.  We discussed the risks of surgery which is small but could be significant if he has continued pain, stiffness, infection.  After discussion he would like to proceed.  Plans for surgery December 12th

## 2024-11-12 DIAGNOSIS — M17.11 PRIMARY OSTEOARTHRITIS OF RIGHT KNEE: Primary | ICD-10-CM

## 2024-11-19 ENCOUNTER — CLINICAL SUPPORT (OUTPATIENT)
Dept: LAB | Facility: HOSPITAL | Age: 64
End: 2024-11-19
Attending: NURSE PRACTITIONER
Payer: COMMERCIAL

## 2024-11-19 ENCOUNTER — HOSPITAL ENCOUNTER (OUTPATIENT)
Dept: RADIOLOGY | Facility: HOSPITAL | Age: 64
Discharge: HOME | End: 2024-11-19
Attending: NURSE PRACTITIONER
Payer: COMMERCIAL

## 2024-11-19 ENCOUNTER — HOSPITAL ENCOUNTER (OUTPATIENT)
Dept: RADIOLOGY | Facility: HOSPITAL | Age: 64
Discharge: HOME | End: 2024-11-19
Attending: ORTHOPAEDIC SURGERY
Payer: COMMERCIAL

## 2024-11-19 DIAGNOSIS — M17.11 PRIMARY OSTEOARTHRITIS OF RIGHT KNEE: ICD-10-CM

## 2024-11-19 LAB
OHS QRS DURATION: 140 MS
OHS QTC CALCULATION: 425 MS

## 2024-11-19 PROCEDURE — 93010 ELECTROCARDIOGRAM REPORT: CPT | Mod: ,,, | Performed by: INTERNAL MEDICINE

## 2024-11-19 PROCEDURE — 71046 X-RAY EXAM CHEST 2 VIEWS: CPT | Mod: TC

## 2024-11-19 PROCEDURE — 73700 CT LOWER EXTREMITY W/O DYE: CPT | Mod: TC,RT

## 2024-11-19 PROCEDURE — 93005 ELECTROCARDIOGRAM TRACING: CPT

## 2024-11-20 RX ORDER — CIPROFLOXACIN 500 MG/1
500 TABLET ORAL 2 TIMES DAILY
Qty: 14 TABLET | Refills: 0 | Status: SHIPPED | OUTPATIENT
Start: 2024-11-20

## 2024-12-03 ENCOUNTER — TELEPHONE (OUTPATIENT)
Dept: ORTHOPEDICS | Facility: CLINIC | Age: 64
End: 2024-12-03
Payer: COMMERCIAL

## 2024-12-03 ENCOUNTER — ANESTHESIA EVENT (OUTPATIENT)
Dept: SURGERY | Facility: HOSPITAL | Age: 64
End: 2024-12-03
Payer: COMMERCIAL

## 2024-12-03 NOTE — TELEPHONE ENCOUNTER
Patient would like post op medications sent to pharmacy - don't want to wait until after with his wife (dementia).

## 2024-12-04 DIAGNOSIS — M17.11 PRIMARY OSTEOARTHRITIS OF RIGHT KNEE: Primary | ICD-10-CM

## 2024-12-04 RX ORDER — KETOROLAC TROMETHAMINE 10 MG/1
10 TABLET, FILM COATED ORAL 3 TIMES DAILY
Qty: 15 TABLET | Refills: 0 | Status: SHIPPED | OUTPATIENT
Start: 2024-12-04 | End: 2024-12-09

## 2024-12-04 RX ORDER — ASPIRIN 81 MG/1
81 TABLET ORAL DAILY
Qty: 60 TABLET | Refills: 0 | Status: SHIPPED | OUTPATIENT
Start: 2024-12-04 | End: 2025-12-04

## 2024-12-04 RX ORDER — OXYCODONE AND ACETAMINOPHEN 5; 325 MG/1; MG/1
1 TABLET ORAL EVERY 6 HOURS PRN
Qty: 20 TABLET | Refills: 0 | Status: SHIPPED | OUTPATIENT
Start: 2024-12-04

## 2024-12-04 RX ORDER — METHOCARBAMOL 750 MG/1
750 TABLET, FILM COATED ORAL 3 TIMES DAILY
Qty: 21 TABLET | Refills: 0 | Status: SHIPPED | OUTPATIENT
Start: 2024-12-04 | End: 2024-12-11

## 2024-12-08 RX ADMIN — BUPIVACAINE 10 MG: 13.3 INJECTION, SUSPENSION, LIPOSOMAL INFILTRATION at 09:12

## 2024-12-09 NOTE — ANESTHESIA PREPROCEDURE EVALUATION
12/09/2024  Zachery Cordero is a 64 y.o., male with   -------------------------------------    Arthritis    Dry eye    ED (erectile dysfunction)    Hearing loss    wears hearing aids    HLD (hyperlipidemia)    HTN (hypertension)    Hypothyroidism, unspecified    Personal history of colonic polyps    Colonoscopy Report    Unspecified sensorineural hearing loss       And   ----------------------------    Cataract extraction w/  intraocular lens implant    Colonoscopy    Colonoscopy w/ polypectomy    Hearing aids    Knee arthroscopy    Mouth surgery    salivary gland    Robotic arthroplasty, knee    Procedure: SDD- ROBOTIC ARTHROPLASTY, KNEE, TOTAL;  Surgeon: Pablito Abad Jr., MD;  Location: [facility];  Service: Orthopedics;  Laterality: Left;       Presents for right SDD TKA     Had previous with bupiv 1.7ml spinal but did have urinary retention - will give po flomax preop and in pacu if needed          Pre-op Assessment    I have reviewed the NPO Status.      Review of Systems  Cardiovascular:     Hypertension                                    Hypertension         Musculoskeletal:  Arthritis        Arthritis          Neurological:      Arthritis                           Endocrine:   Hypothyroidism       Hypothyroidism              Physical Exam  General: Well nourished, Cooperative, Alert and Oriented    Airway:  Mallampati: II   Mouth Opening: Normal  TM Distance: Normal  Tongue: Normal  Neck ROM: Normal ROM    Dental:  Intact    Chest/Lungs:  Clear to auscultation, Normal Respiratory Rate    Heart:  Rate: Normal  Rhythm: Regular Rhythm        Anesthesia Plan  Type of Anesthesia, risks & benefits discussed:    Anesthesia Type: Spinal  Intra-op Monitoring Plan: Standard ASA Monitors  Post Op Pain Control Plan: multimodal analgesia  Informed Consent: Patient consented to blood products? Yes  ASA Score: 3  Day  of Surgery Review of History & Physical: H&P Update referred to the surgeon/provider.  Anesthesia Plan Notes: ERAS ordered by surgeon according to [facility] protocol  Flomax in preop to decrease urinary retention   Goal directed IV Fluid therapy  will give po flomax preop so that he has a total of 0.8mg this morning - did have his home dose  No whelan necessary unless hx of BPH/urinary retention - pt has gone home with catheter in past - is ok with going home with it today  In PACU will perform postop pain adductor canal block for postop pain control with Bupiv 0.25% wExparel  as requested by primary surgeon    Ready For Surgery From Anesthesia Perspective.     .

## 2024-12-10 ENCOUNTER — HOSPITAL ENCOUNTER (OUTPATIENT)
Facility: HOSPITAL | Age: 64
Discharge: HOME | End: 2024-12-10
Attending: ORTHOPAEDIC SURGERY | Admitting: ORTHOPAEDIC SURGERY
Payer: COMMERCIAL

## 2024-12-10 ENCOUNTER — ANESTHESIA (OUTPATIENT)
Dept: SURGERY | Facility: HOSPITAL | Age: 64
End: 2024-12-10
Payer: COMMERCIAL

## 2024-12-10 VITALS
WEIGHT: 259.69 LBS | DIASTOLIC BLOOD PRESSURE: 78 MMHG | OXYGEN SATURATION: 95 % | TEMPERATURE: 98 F | HEIGHT: 76 IN | HEART RATE: 112 BPM | BODY MASS INDEX: 31.62 KG/M2 | SYSTOLIC BLOOD PRESSURE: 128 MMHG | RESPIRATION RATE: 18 BRPM

## 2024-12-10 DIAGNOSIS — M17.11 PRIMARY OSTEOARTHRITIS OF RIGHT KNEE: ICD-10-CM

## 2024-12-10 LAB — POCT GLUCOSE: 105 MG/DL (ref 70–110)

## 2024-12-10 PROCEDURE — 82962 GLUCOSE BLOOD TEST: CPT | Performed by: ORTHOPAEDIC SURGERY

## 2024-12-10 PROCEDURE — C1713 ANCHOR/SCREW BN/BN,TIS/BN: HCPCS | Performed by: ORTHOPAEDIC SURGERY

## 2024-12-10 PROCEDURE — 25000003 PHARM REV CODE 250: Performed by: STUDENT IN AN ORGANIZED HEALTH CARE EDUCATION/TRAINING PROGRAM

## 2024-12-10 PROCEDURE — 63600175 PHARM REV CODE 636 W HCPCS: Performed by: NURSE PRACTITIONER

## 2024-12-10 PROCEDURE — 27447 TOTAL KNEE ARTHROPLASTY: CPT | Mod: RT,,, | Performed by: ORTHOPAEDIC SURGERY

## 2024-12-10 PROCEDURE — C9290 INJ, BUPIVACAINE LIPOSOME: HCPCS | Performed by: ANESTHESIOLOGY

## 2024-12-10 PROCEDURE — G0378 HOSPITAL OBSERVATION PER HR: HCPCS

## 2024-12-10 PROCEDURE — 37000009 HC ANESTHESIA EA ADD 15 MINS: Performed by: ORTHOPAEDIC SURGERY

## 2024-12-10 PROCEDURE — D9220A PRA ANESTHESIA: Mod: ANES,,, | Performed by: ANESTHESIOLOGY

## 2024-12-10 PROCEDURE — 63600175 PHARM REV CODE 636 W HCPCS: Performed by: ORTHOPAEDIC SURGERY

## 2024-12-10 PROCEDURE — 64447 NJX AA&/STRD FEMORAL NRV IMG: CPT | Mod: 59,RT,, | Performed by: ANESTHESIOLOGY

## 2024-12-10 PROCEDURE — 27447 TOTAL KNEE ARTHROPLASTY: CPT | Mod: AS,RT,, | Performed by: NURSE PRACTITIONER

## 2024-12-10 PROCEDURE — 99900031 HC PATIENT EDUCATION (STAT)

## 2024-12-10 PROCEDURE — 36000712 HC OR TIME LEV V 1ST 15 MIN: Performed by: ORTHOPAEDIC SURGERY

## 2024-12-10 PROCEDURE — 97162 PT EVAL MOD COMPLEX 30 MIN: CPT

## 2024-12-10 PROCEDURE — 94799 UNLISTED PULMONARY SVC/PX: CPT

## 2024-12-10 PROCEDURE — C1776 JOINT DEVICE (IMPLANTABLE): HCPCS | Performed by: ORTHOPAEDIC SURGERY

## 2024-12-10 PROCEDURE — 94761 N-INVAS EAR/PLS OXIMETRY MLT: CPT

## 2024-12-10 PROCEDURE — 25000003 PHARM REV CODE 250: Performed by: NURSE PRACTITIONER

## 2024-12-10 PROCEDURE — 63600175 PHARM REV CODE 636 W HCPCS: Performed by: STUDENT IN AN ORGANIZED HEALTH CARE EDUCATION/TRAINING PROGRAM

## 2024-12-10 PROCEDURE — 51798 US URINE CAPACITY MEASURE: CPT | Performed by: ORTHOPAEDIC SURGERY

## 2024-12-10 PROCEDURE — 64447 NJX AA&/STRD FEMORAL NRV IMG: CPT | Performed by: ANESTHESIOLOGY

## 2024-12-10 PROCEDURE — 25000003 PHARM REV CODE 250: Performed by: ORTHOPAEDIC SURGERY

## 2024-12-10 PROCEDURE — 63600175 PHARM REV CODE 636 W HCPCS: Mod: JZ,JG | Performed by: ANESTHESIOLOGY

## 2024-12-10 PROCEDURE — 51702 INSERT TEMP BLADDER CATH: CPT

## 2024-12-10 PROCEDURE — 37000008 HC ANESTHESIA 1ST 15 MINUTES: Performed by: ORTHOPAEDIC SURGERY

## 2024-12-10 PROCEDURE — 0055T BONE SRGRY CMPTR CT/MRI IMAG: CPT | Mod: ,,, | Performed by: ORTHOPAEDIC SURGERY

## 2024-12-10 PROCEDURE — 71000033 HC RECOVERY, INTIAL HOUR: Performed by: ORTHOPAEDIC SURGERY

## 2024-12-10 PROCEDURE — D9220A PRA ANESTHESIA: Mod: CRNA,,, | Performed by: STUDENT IN AN ORGANIZED HEALTH CARE EDUCATION/TRAINING PROGRAM

## 2024-12-10 PROCEDURE — 27201423 OPTIME MED/SURG SUP & DEVICES STERILE SUPPLY: Performed by: ORTHOPAEDIC SURGERY

## 2024-12-10 PROCEDURE — 27000221 HC OXYGEN, UP TO 24 HOURS

## 2024-12-10 PROCEDURE — 36000713 HC OR TIME LEV V EA ADD 15 MIN: Performed by: ORTHOPAEDIC SURGERY

## 2024-12-10 DEVICE — TIBIAL COMPONENT
Type: IMPLANTABLE DEVICE | Site: KNEE | Status: FUNCTIONAL
Brand: TRIATHLON

## 2024-12-10 DEVICE — TIBIAL BEARING INSERT - CS
Type: IMPLANTABLE DEVICE | Site: KNEE | Status: FUNCTIONAL
Brand: TRIATHLON

## 2024-12-10 DEVICE — CRUCIATE RETAINING FEMORAL
Type: IMPLANTABLE DEVICE | Site: KNEE | Status: FUNCTIONAL
Brand: TRIATHLON

## 2024-12-10 RX ORDER — BUPIVACAINE HYDROCHLORIDE 7.5 MG/ML
INJECTION, SOLUTION INTRASPINAL
Status: DISCONTINUED
Start: 2024-12-10 | End: 2024-12-10 | Stop reason: HOSPADM

## 2024-12-10 RX ORDER — BUPIVACAINE 13.3 MG/ML
10 INJECTION, SUSPENSION, LIPOSOMAL INFILTRATION ONCE
Status: DISCONTINUED | OUTPATIENT
Start: 2024-12-10 | End: 2024-12-10 | Stop reason: HOSPADM

## 2024-12-10 RX ORDER — BUPIVACAINE HYDROCHLORIDE 7.5 MG/ML
INJECTION, SOLUTION EPIDURAL; RETROBULBAR
Status: COMPLETED | OUTPATIENT
Start: 2024-12-10 | End: 2024-12-10

## 2024-12-10 RX ORDER — SODIUM CHLORIDE 0.9 % (FLUSH) 0.9 %
SYRINGE (ML) INJECTION
Status: DISCONTINUED
Start: 2024-12-10 | End: 2024-12-10 | Stop reason: HOSPADM

## 2024-12-10 RX ORDER — VALSARTAN 160 MG/1
320 TABLET ORAL DAILY
Status: CANCELLED | OUTPATIENT
Start: 2024-12-11

## 2024-12-10 RX ORDER — HALOPERIDOL 5 MG/ML
0.5 INJECTION INTRAMUSCULAR EVERY 10 MIN PRN
Status: DISCONTINUED | OUTPATIENT
Start: 2024-12-10 | End: 2024-12-10 | Stop reason: HOSPADM

## 2024-12-10 RX ORDER — VANCOMYCIN HYDROCHLORIDE 1 G/20ML
INJECTION, POWDER, LYOPHILIZED, FOR SOLUTION INTRAVENOUS
Status: DISCONTINUED
Start: 2024-12-10 | End: 2024-12-10 | Stop reason: HOSPADM

## 2024-12-10 RX ORDER — HYDROCODONE BITARTRATE AND ACETAMINOPHEN 5; 325 MG/1; MG/1
1 TABLET ORAL EVERY 4 HOURS PRN
Status: DISCONTINUED | OUTPATIENT
Start: 2024-12-10 | End: 2024-12-10

## 2024-12-10 RX ORDER — HYDROCODONE BITARTRATE AND ACETAMINOPHEN 5; 325 MG/1; MG/1
1 TABLET ORAL
Status: DISCONTINUED | OUTPATIENT
Start: 2024-12-10 | End: 2024-12-10

## 2024-12-10 RX ORDER — KETOROLAC TROMETHAMINE 30 MG/ML
INJECTION, SOLUTION INTRAMUSCULAR; INTRAVENOUS
Status: DISCONTINUED | OUTPATIENT
Start: 2024-12-10 | End: 2024-12-10 | Stop reason: HOSPADM

## 2024-12-10 RX ORDER — PROPOFOL 10 MG/ML
VIAL (ML) INTRAVENOUS
Status: DISCONTINUED | OUTPATIENT
Start: 2024-12-10 | End: 2024-12-10

## 2024-12-10 RX ORDER — METOCLOPRAMIDE HYDROCHLORIDE 5 MG/ML
10 INJECTION INTRAMUSCULAR; INTRAVENOUS
Status: DISCONTINUED | OUTPATIENT
Start: 2024-12-10 | End: 2024-12-10 | Stop reason: HOSPADM

## 2024-12-10 RX ORDER — EPINEPHRINE 1 MG/ML
INJECTION, SOLUTION, CONCENTRATE INTRAVENOUS
Status: DISCONTINUED | OUTPATIENT
Start: 2024-12-10 | End: 2024-12-10 | Stop reason: HOSPADM

## 2024-12-10 RX ORDER — CEFAZOLIN 2 G/1
2 INJECTION, POWDER, FOR SOLUTION INTRAMUSCULAR; INTRAVENOUS
Status: DISCONTINUED | OUTPATIENT
Start: 2024-12-10 | End: 2024-12-10 | Stop reason: HOSPADM

## 2024-12-10 RX ORDER — CEFADROXIL 500 MG/1
500 CAPSULE ORAL EVERY 12 HOURS
Qty: 28 CAPSULE | Refills: 0 | Status: SHIPPED | OUTPATIENT
Start: 2024-12-10 | End: 2024-12-24

## 2024-12-10 RX ORDER — MIDAZOLAM HYDROCHLORIDE 1 MG/ML
INJECTION INTRAMUSCULAR; INTRAVENOUS
Status: DISCONTINUED | OUTPATIENT
Start: 2024-12-10 | End: 2024-12-10

## 2024-12-10 RX ORDER — SODIUM CHLORIDE, SODIUM GLUCONATE, SODIUM ACETATE, POTASSIUM CHLORIDE AND MAGNESIUM CHLORIDE 30; 37; 368; 526; 502 MG/100ML; MG/100ML; MG/100ML; MG/100ML; MG/100ML
INJECTION, SOLUTION INTRAVENOUS CONTINUOUS
Status: DISCONTINUED | OUTPATIENT
Start: 2024-12-10 | End: 2024-12-10

## 2024-12-10 RX ORDER — KETOROLAC TROMETHAMINE 10 MG/1
10 TABLET, FILM COATED ORAL EVERY 6 HOURS
Status: DISCONTINUED | OUTPATIENT
Start: 2024-12-10 | End: 2024-12-10 | Stop reason: HOSPADM

## 2024-12-10 RX ORDER — FAMOTIDINE 20 MG/1
20 TABLET, FILM COATED ORAL 2 TIMES DAILY
Status: DISCONTINUED | OUTPATIENT
Start: 2024-12-10 | End: 2024-12-10 | Stop reason: HOSPADM

## 2024-12-10 RX ORDER — KETOROLAC TROMETHAMINE 10 MG/1
10 TABLET, FILM COATED ORAL 3 TIMES DAILY
Qty: 15 TABLET | Refills: 0 | Status: SHIPPED | OUTPATIENT
Start: 2024-12-10 | End: 2024-12-15

## 2024-12-10 RX ORDER — METHOCARBAMOL 750 MG/1
750 TABLET, FILM COATED ORAL 3 TIMES DAILY PRN
Status: DISCONTINUED | OUTPATIENT
Start: 2024-12-10 | End: 2024-12-10 | Stop reason: HOSPADM

## 2024-12-10 RX ORDER — EPHEDRINE SULFATE 50 MG/ML
INJECTION, SOLUTION INTRAVENOUS
Status: DISCONTINUED | OUTPATIENT
Start: 2024-12-10 | End: 2024-12-10

## 2024-12-10 RX ORDER — ROPIVACAINE HYDROCHLORIDE 5 MG/ML
INJECTION, SOLUTION EPIDURAL; INFILTRATION; PERINEURAL
Status: DISCONTINUED | OUTPATIENT
Start: 2024-12-10 | End: 2024-12-10 | Stop reason: HOSPADM

## 2024-12-10 RX ORDER — CALCIUM CHLORIDE INJECTION 100 MG/ML
INJECTION, SOLUTION INTRAVENOUS
Status: DISCONTINUED | OUTPATIENT
Start: 2024-12-10 | End: 2024-12-10

## 2024-12-10 RX ORDER — HYDROMORPHONE HYDROCHLORIDE 2 MG/ML
0.4 INJECTION, SOLUTION INTRAMUSCULAR; INTRAVENOUS; SUBCUTANEOUS EVERY 5 MIN PRN
Status: DISCONTINUED | OUTPATIENT
Start: 2024-12-10 | End: 2024-12-10 | Stop reason: HOSPADM

## 2024-12-10 RX ORDER — BUPIVACAINE HYDROCHLORIDE 2.5 MG/ML
30 INJECTION, SOLUTION EPIDURAL; INFILTRATION; INTRACAUDAL ONCE
Status: DISCONTINUED | OUTPATIENT
Start: 2024-12-10 | End: 2024-12-10 | Stop reason: HOSPADM

## 2024-12-10 RX ORDER — SODIUM CHLORIDE, SODIUM LACTATE, POTASSIUM CHLORIDE, CALCIUM CHLORIDE 600; 310; 30; 20 MG/100ML; MG/100ML; MG/100ML; MG/100ML
INJECTION, SOLUTION INTRAVENOUS CONTINUOUS
Status: DISCONTINUED | OUTPATIENT
Start: 2024-12-10 | End: 2024-12-10

## 2024-12-10 RX ORDER — KETOROLAC TROMETHAMINE 30 MG/ML
INJECTION, SOLUTION INTRAMUSCULAR; INTRAVENOUS
Status: DISCONTINUED
Start: 2024-12-10 | End: 2024-12-10 | Stop reason: HOSPADM

## 2024-12-10 RX ORDER — DOCUSATE SODIUM 100 MG/1
200 CAPSULE, LIQUID FILLED ORAL
Status: DISCONTINUED | OUTPATIENT
Start: 2024-12-11 | End: 2024-12-10 | Stop reason: HOSPADM

## 2024-12-10 RX ORDER — ONDANSETRON 4 MG/1
4 TABLET, ORALLY DISINTEGRATING ORAL
Status: COMPLETED | OUTPATIENT
Start: 2024-12-10 | End: 2024-12-10

## 2024-12-10 RX ORDER — BUPIVACAINE 13.3 MG/ML
INJECTION, SUSPENSION, LIPOSOMAL INFILTRATION
Status: DISCONTINUED | OUTPATIENT
Start: 2024-12-08 | End: 2024-12-10

## 2024-12-10 RX ORDER — OXYCODONE AND ACETAMINOPHEN 5; 325 MG/1; MG/1
1 TABLET ORAL EVERY 4 HOURS PRN
Status: DISCONTINUED | OUTPATIENT
Start: 2024-12-10 | End: 2024-12-10 | Stop reason: HOSPADM

## 2024-12-10 RX ORDER — TRANEXAMIC ACID 650 MG/1
1950 TABLET ORAL
Status: DISCONTINUED | OUTPATIENT
Start: 2024-12-10 | End: 2024-12-10 | Stop reason: HOSPADM

## 2024-12-10 RX ORDER — CEFAZOLIN 2 G/1
2 INJECTION, POWDER, FOR SOLUTION INTRAMUSCULAR; INTRAVENOUS
Status: COMPLETED | OUTPATIENT
Start: 2024-12-10 | End: 2024-12-10

## 2024-12-10 RX ORDER — BISACODYL 10 MG/1
10 SUPPOSITORY RECTAL DAILY PRN
Status: DISCONTINUED | OUTPATIENT
Start: 2024-12-10 | End: 2024-12-10 | Stop reason: HOSPADM

## 2024-12-10 RX ORDER — GABAPENTIN 300 MG/1
300 CAPSULE ORAL
Status: COMPLETED | OUTPATIENT
Start: 2024-12-10 | End: 2024-12-10

## 2024-12-10 RX ORDER — ROPIVACAINE HYDROCHLORIDE 5 MG/ML
INJECTION, SOLUTION EPIDURAL; INFILTRATION; PERINEURAL
Status: DISCONTINUED
Start: 2024-12-10 | End: 2024-12-10 | Stop reason: HOSPADM

## 2024-12-10 RX ORDER — BUPIVACAINE HYDROCHLORIDE 2.5 MG/ML
INJECTION, SOLUTION EPIDURAL; INFILTRATION; INTRACAUDAL
Status: DISCONTINUED | OUTPATIENT
Start: 2024-12-10 | End: 2024-12-10

## 2024-12-10 RX ORDER — TAMSULOSIN HYDROCHLORIDE 0.4 MG/1
0.4 CAPSULE ORAL 2 TIMES DAILY
Status: CANCELLED | OUTPATIENT
Start: 2024-12-10

## 2024-12-10 RX ORDER — METHOCARBAMOL 100 MG/ML
1000 INJECTION, SOLUTION INTRAMUSCULAR; INTRAVENOUS ONCE AS NEEDED
Status: DISCONTINUED | OUTPATIENT
Start: 2024-12-10 | End: 2024-12-10 | Stop reason: HOSPADM

## 2024-12-10 RX ORDER — BUPIVACAINE 13.3 MG/ML
INJECTION, SUSPENSION, LIPOSOMAL INFILTRATION
Status: COMPLETED
Start: 2024-12-10 | End: 2024-12-10

## 2024-12-10 RX ORDER — BUPIVACAINE HYDROCHLORIDE 2.5 MG/ML
INJECTION, SOLUTION EPIDURAL; INFILTRATION; INTRACAUDAL
Status: COMPLETED
Start: 2024-12-10 | End: 2024-12-10

## 2024-12-10 RX ORDER — MORPHINE SULFATE 10 MG/ML
INJECTION INTRAMUSCULAR; INTRAVENOUS; SUBCUTANEOUS
Status: DISCONTINUED
Start: 2024-12-10 | End: 2024-12-10 | Stop reason: HOSPADM

## 2024-12-10 RX ORDER — EPINEPHRINE 1 MG/ML
INJECTION, SOLUTION, CONCENTRATE INTRAVENOUS
Status: DISCONTINUED
Start: 2024-12-10 | End: 2024-12-10 | Stop reason: HOSPADM

## 2024-12-10 RX ORDER — SCOLOPAMINE TRANSDERMAL SYSTEM 1 MG/1
1 PATCH, EXTENDED RELEASE TRANSDERMAL ONCE AS NEEDED
Status: DISCONTINUED | OUTPATIENT
Start: 2024-12-10 | End: 2024-12-10 | Stop reason: HOSPADM

## 2024-12-10 RX ORDER — MIDAZOLAM HYDROCHLORIDE 2 MG/2ML
.5-4 INJECTION, SOLUTION INTRAMUSCULAR; INTRAVENOUS
Status: DISCONTINUED | OUTPATIENT
Start: 2024-12-10 | End: 2024-12-10 | Stop reason: HOSPADM

## 2024-12-10 RX ORDER — TALC
6 POWDER (GRAM) TOPICAL NIGHTLY PRN
Status: DISCONTINUED | OUTPATIENT
Start: 2024-12-10 | End: 2024-12-10 | Stop reason: HOSPADM

## 2024-12-10 RX ORDER — MORPHINE SULFATE 10 MG/ML
INJECTION INTRAMUSCULAR; INTRAVENOUS; SUBCUTANEOUS
Status: DISCONTINUED | OUTPATIENT
Start: 2024-12-10 | End: 2024-12-10 | Stop reason: HOSPADM

## 2024-12-10 RX ORDER — MORPHINE SULFATE 4 MG/ML
4 INJECTION, SOLUTION INTRAMUSCULAR; INTRAVENOUS
Status: DISCONTINUED | OUTPATIENT
Start: 2024-12-10 | End: 2024-12-10 | Stop reason: HOSPADM

## 2024-12-10 RX ORDER — ADHESIVE BANDAGE
30 BANDAGE TOPICAL DAILY PRN
Status: DISCONTINUED | OUTPATIENT
Start: 2024-12-10 | End: 2024-12-10 | Stop reason: HOSPADM

## 2024-12-10 RX ORDER — VASOPRESSIN 20 [USP'U]/ML
INJECTION, SOLUTION INTRAMUSCULAR; SUBCUTANEOUS
Status: DISCONTINUED | OUTPATIENT
Start: 2024-12-10 | End: 2024-12-10

## 2024-12-10 RX ORDER — SODIUM CHLORIDE 9 MG/ML
INJECTION, SOLUTION INTRAVENOUS CONTINUOUS
Status: DISCONTINUED | OUTPATIENT
Start: 2024-12-10 | End: 2024-12-10 | Stop reason: HOSPADM

## 2024-12-10 RX ORDER — KETOROLAC TROMETHAMINE 10 MG/1
10 TABLET, FILM COATED ORAL
Status: COMPLETED | OUTPATIENT
Start: 2024-12-10 | End: 2024-12-10

## 2024-12-10 RX ORDER — AMOXICILLIN 250 MG
2 CAPSULE ORAL 2 TIMES DAILY
Status: DISCONTINUED | OUTPATIENT
Start: 2024-12-10 | End: 2024-12-10 | Stop reason: HOSPADM

## 2024-12-10 RX ORDER — TAMSULOSIN HYDROCHLORIDE 0.4 MG/1
0.4 CAPSULE ORAL ONCE
Status: COMPLETED | OUTPATIENT
Start: 2024-12-10 | End: 2024-12-10

## 2024-12-10 RX ORDER — NAPROXEN SODIUM 220 MG/1
81 TABLET, FILM COATED ORAL 2 TIMES DAILY
Status: DISCONTINUED | OUTPATIENT
Start: 2024-12-11 | End: 2024-12-10 | Stop reason: HOSPADM

## 2024-12-10 RX ORDER — PHENYLEPHRINE HCL IN 0.9% NACL 1 MG/10 ML
SYRINGE (ML) INTRAVENOUS
Status: DISCONTINUED | OUTPATIENT
Start: 2024-12-10 | End: 2024-12-10

## 2024-12-10 RX ORDER — FINASTERIDE 5 MG/1
5 TABLET, FILM COATED ORAL DAILY
Status: CANCELLED | OUTPATIENT
Start: 2024-12-11

## 2024-12-10 RX ORDER — LIDOCAINE HYDROCHLORIDE 10 MG/ML
INJECTION, SOLUTION EPIDURAL; INFILTRATION; INTRACAUDAL; PERINEURAL
Status: DISCONTINUED | OUTPATIENT
Start: 2024-12-10 | End: 2024-12-10

## 2024-12-10 RX ORDER — POLYETHYLENE GLYCOL 3350 17 G/17G
17 POWDER, FOR SOLUTION ORAL NIGHTLY
Status: DISCONTINUED | OUTPATIENT
Start: 2024-12-10 | End: 2024-12-10 | Stop reason: HOSPADM

## 2024-12-10 RX ORDER — ONDANSETRON HYDROCHLORIDE 2 MG/ML
4 INJECTION, SOLUTION INTRAVENOUS EVERY 6 HOURS PRN
Status: DISCONTINUED | OUTPATIENT
Start: 2024-12-10 | End: 2024-12-10 | Stop reason: HOSPADM

## 2024-12-10 RX ORDER — MEPERIDINE HYDROCHLORIDE 25 MG/ML
6.25 INJECTION INTRAMUSCULAR; INTRAVENOUS; SUBCUTANEOUS ONCE AS NEEDED
Status: DISCONTINUED | OUTPATIENT
Start: 2024-12-10 | End: 2024-12-10 | Stop reason: HOSPADM

## 2024-12-10 RX ORDER — PROCHLORPERAZINE EDISYLATE 5 MG/ML
5 INJECTION INTRAMUSCULAR; INTRAVENOUS EVERY 30 MIN PRN
Status: DISCONTINUED | OUTPATIENT
Start: 2024-12-10 | End: 2024-12-10 | Stop reason: HOSPADM

## 2024-12-10 RX ORDER — LEVOTHYROXINE SODIUM 50 UG/1
50 TABLET ORAL
Status: CANCELLED | OUTPATIENT
Start: 2024-12-11

## 2024-12-10 RX ORDER — ACETAMINOPHEN 500 MG
1000 TABLET ORAL
Status: COMPLETED | OUTPATIENT
Start: 2024-12-10 | End: 2024-12-10

## 2024-12-10 RX ADMIN — VASOPRESSIN 2 UNITS: 20 INJECTION INTRAVENOUS at 08:12

## 2024-12-10 RX ADMIN — Medication 100 MCG: at 07:12

## 2024-12-10 RX ADMIN — KETOROLAC TROMETHAMINE 10 MG: 10 TABLET, FILM COATED ORAL at 10:12

## 2024-12-10 RX ADMIN — VASOPRESSIN 3 UNITS: 20 INJECTION INTRAVENOUS at 08:12

## 2024-12-10 RX ADMIN — CEFAZOLIN 2 G: 2 INJECTION, POWDER, FOR SOLUTION INTRAMUSCULAR; INTRAVENOUS at 07:12

## 2024-12-10 RX ADMIN — KETOROLAC TROMETHAMINE 10 MG: 10 TABLET, FILM COATED ORAL at 05:12

## 2024-12-10 RX ADMIN — HYDROCODONE BITARTRATE AND ACETAMINOPHEN 1 TABLET: 5; 325 TABLET ORAL at 10:12

## 2024-12-10 RX ADMIN — PROPOFOL 100 MG: 10 INJECTION, EMULSION INTRAVENOUS at 07:12

## 2024-12-10 RX ADMIN — SODIUM CHLORIDE: 9 INJECTION, SOLUTION INTRAVENOUS at 07:12

## 2024-12-10 RX ADMIN — CALCIUM CHLORIDE INJECTION 0.5 G: 100 INJECTION, SOLUTION INTRAVENOUS at 08:12

## 2024-12-10 RX ADMIN — PHENYLEPHRINE HYDROCHLORIDE 25 MCG/MIN: 10 INJECTION INTRAVENOUS at 07:12

## 2024-12-10 RX ADMIN — BUPIVACAINE HYDROCHLORIDE 20 ML: 2.5 INJECTION, SOLUTION EPIDURAL; INFILTRATION; INTRACAUDAL; PERINEURAL at 09:12

## 2024-12-10 RX ADMIN — Medication 150 MCG: at 07:12

## 2024-12-10 RX ADMIN — SODIUM CHLORIDE: 9 INJECTION, SOLUTION INTRAVENOUS at 06:12

## 2024-12-10 RX ADMIN — LIDOCAINE HYDROCHLORIDE 50 MG: 10 INJECTION, SOLUTION EPIDURAL; INFILTRATION; INTRACAUDAL; PERINEURAL at 07:12

## 2024-12-10 RX ADMIN — VASOPRESSIN 1 UNITS: 20 INJECTION INTRAVENOUS at 08:12

## 2024-12-10 RX ADMIN — CEFAZOLIN 2 G: 2 INJECTION, POWDER, FOR SOLUTION INTRAMUSCULAR; INTRAVENOUS at 10:12

## 2024-12-10 RX ADMIN — ONDANSETRON 4 MG: 4 TABLET, ORALLY DISINTEGRATING ORAL at 05:12

## 2024-12-10 RX ADMIN — PROPOFOL 100 MCG/KG/MIN: 10 INJECTION, EMULSION INTRAVENOUS at 07:12

## 2024-12-10 RX ADMIN — VASOPRESSIN 1 UNITS: 20 INJECTION INTRAVENOUS at 07:12

## 2024-12-10 RX ADMIN — BUPIVACAINE HYDROCHLORIDE 1.4 ML: 7.5 INJECTION, SOLUTION EPIDURAL; RETROBULBAR at 06:12

## 2024-12-10 RX ADMIN — METOCLOPRAMIDE 10 MG: 5 INJECTION, SOLUTION INTRAMUSCULAR; INTRAVENOUS at 03:12

## 2024-12-10 RX ADMIN — MIDAZOLAM 2 MG: 1 INJECTION INTRAMUSCULAR; INTRAVENOUS at 06:12

## 2024-12-10 RX ADMIN — EPHEDRINE SULFATE 50 MG: 50 INJECTION INTRAVENOUS at 09:12

## 2024-12-10 RX ADMIN — GABAPENTIN 300 MG: 300 CAPSULE ORAL at 05:12

## 2024-12-10 RX ADMIN — ACETAMINOPHEN 1000 MG: 500 TABLET ORAL at 05:12

## 2024-12-10 NOTE — ANESTHESIA POSTPROCEDURE EVALUATION
Anesthesia Post Evaluation    Patient: Zachery Cordero    Procedure(s) Performed: Procedure(s) (LRB):  SDD - ROBOTIC ARTHROPLASTY, KNEE, TOTAL (Right)    Final Anesthesia Type: spinal      Patient location during evaluation: PACU  Patient participation: Yes- Able to Participate  Level of consciousness: awake and alert  Post-procedure vital signs: reviewed and stable  Pain management: adequate (Adductor canal block in pacu)  Airway patency: patent    PONV status at discharge: No PONV  Anesthetic complications: no      Cardiovascular status: blood pressure returned to baseline and hemodynamically stable  Respiratory status: unassisted and spontaneous ventilation                Vitals Value Taken Time   /81 12/10/24 1259   Temp 36.5 °C (97.7 °F) 12/10/24 0911   Pulse 90 12/10/24 1259   Resp 18 12/10/24 1059   SpO2 96 % 12/10/24 1259         Event Time   Out of Recovery 09:53:57         Pain/Sheree Score: Pain Rating Prior to Med Admin: 5 (12/10/2024 10:54 AM)  Pain Rating Post Med Admin: 3 (12/10/2024 11:54 AM)  Sheree Score: 9 (12/10/2024  9:50 AM)

## 2024-12-10 NOTE — PLAN OF CARE
Problem: Adult Inpatient Plan of Care  Goal: Plan of Care Review  12/10/2024 1713 by Chance Ray RN  Outcome: Met  12/10/2024 1431 by Chance Ray RN  Outcome: Progressing  Goal: Patient-Specific Goal (Individualized)  12/10/2024 1713 by Chance Ray RN  Outcome: Met  12/10/2024 1431 by Chance Ray, RN  Outcome: Progressing  Goal: Absence of Hospital-Acquired Illness or Injury  12/10/2024 1713 by Chance Ray RN  Outcome: Met  12/10/2024 1431 by Chance Ray RN  Outcome: Progressing  Goal: Optimal Comfort and Wellbeing  12/10/2024 1713 by Chance Ray RN  Outcome: Met  12/10/2024 1431 by Chance Ray RN  Outcome: Progressing  Goal: Readiness for Transition of Care  12/10/2024 1713 by Chance Ray RN  Outcome: Met  12/10/2024 1431 by Chance Ray RN  Outcome: Progressing     Problem: Wound  Goal: Optimal Coping  12/10/2024 1713 by Chance Ray, GARCIA  Outcome: Met  12/10/2024 1431 by Chance Ray RN  Outcome: Progressing  Goal: Optimal Functional Ability  12/10/2024 1713 by Chance Ray, GARCIA  Outcome: Met  12/10/2024 1431 by Chance Ray RN  Outcome: Progressing  Goal: Absence of Infection Signs and Symptoms  12/10/2024 1713 by Chance Ray, GARCIA  Outcome: Met  12/10/2024 1431 by Chance aRy, RN  Outcome: Progressing  Goal: Improved Oral Intake  12/10/2024 1713 by Chance Ray, GARCIA  Outcome: Met  12/10/2024 1431 by Chance Ray, RN  Outcome: Progressing  Goal: Optimal Pain Control and Function  12/10/2024 1713 by Chance Ray, GARCIA  Outcome: Met  12/10/2024 1431 by Chance Ray RN  Outcome: Progressing  Goal: Skin Health and Integrity  12/10/2024 1713 by Chance Ray, AGRCIA  Outcome: Met  12/10/2024 1431 by Chance Ray, RN  Outcome: Progressing  Goal: Optimal Wound Healing  12/10/2024 1713 by Chance Ray, GARCIA  Outcome: Met  12/10/2024 1431 by Chance Ray, RN  Outcome: Progressing     Problem: Infection  Goal: Absence of Infection Signs and Symptoms  12/10/2024 1713 by Chance Ray, RN  Outcome: Met  12/10/2024  1431 by Chance Ray, RN  Outcome: Progressing

## 2024-12-10 NOTE — DISCHARGE SUMMARY
[facility] - Periop Services  Discharge Note  Short Stay    Procedure(s) (LRB):  SDD - ROBOTIC ARTHROPLASTY, KNEE, TOTAL (Right)      OUTCOME: Patient tolerated treatment/procedure well without complication and is now ready for discharge.    DISPOSITION: Home or Self Care    FINAL DIAGNOSIS:  <principal problem not specified>    FOLLOWUP: In clinic    DISCHARGE INSTRUCTIONS:  No discharge procedures on file.     TIME SPENT ON DISCHARGE: 10 minutes

## 2024-12-10 NOTE — ANESTHESIA PROCEDURE NOTES
Spinal    Diagnosis: Osteoarthritis  Patient location during procedure: OR  Start time: 12/10/2024 6:53 AM  Timeout: 12/10/2024 6:52 AM  End time: 12/10/2024 6:57 AM    Staffing  Authorizing Provider: Lisa Correia MD  Performing Provider: Lisa Correia MD    Staffing  Performed by: Lisa Correia MD  Authorized by: Lisa Correia MD    Preanesthetic Checklist  Completed: patient identified, IV checked, site marked, risks and benefits discussed, surgical consent, monitors and equipment checked, pre-op evaluation and timeout performed  Spinal Block  Patient position: sitting  Prep: ChloraPrep  Patient monitoring: heart rate, continuous pulse ox and frequent blood pressure checks  Approach: midline  Location: L3-4  Injection technique: single shot  CSF Fluid: clear free-flowing CSF  Needle  Needle type: pencil-tip   Needle gauge: 25 G  Needle length: 3.5 in  Additional Documentation: incremental injection and negative aspiration for heme  Needle localization: anatomical landmarks  Assessment  Ease of block: moderate  Patient's tolerance of the procedure: comfortable throughout block and no complaints  Medications:    Medications: bupivacaine (pf) (MARCAINE) injection 0.75% - Intraspinal   1.4 mL - 12/10/2024 6:57:00 AM

## 2024-12-10 NOTE — TRANSFER OF CARE
"Anesthesia Transfer of Care Note    Patient: Zachery Cordero    Procedure(s) Performed: Procedure(s) (LRB):  SDD - ROBOTIC ARTHROPLASTY, KNEE, TOTAL (Right)    Patient location: PACU    Anesthesia Type: spinal and MAC    Transport from OR: Transported from OR on room air with adequate spontaneous ventilation    Post pain: adequate analgesia    Post assessment: no apparent anesthetic complications    Post vital signs: stable    Level of consciousness: sedated    Nausea/Vomiting: no nausea/vomiting    Complications: none    Transfer of care protocol was followed      Last vitals: Visit Vitals  BP (!) 120/93   Pulse 90   Temp 36.4 °C (97.5 °F) (Tympanic)   Resp 18   Ht 6' 4" (1.93 m)   Wt 117.8 kg (259 lb 11.2 oz)   SpO2 96%   BMI 31.61 kg/m²     "

## 2024-12-10 NOTE — ANESTHESIA PROCEDURE NOTES
Peripheral Block    Patient location during procedure: post-op   Block not for primary anesthetic.  Reason for block: at surgeon's request and post-op pain management   Post-op Pain Location: Knee Right   Start time: 12/10/2024 9:22 AM  Timeout: 12/10/2024 9:21 AM   End time: 12/10/2024 9:23 AM    Staffing  Authorizing Provider: Lisa Correia MD  Performing Provider: Lisa Correia MD    Staffing  Performed by: Lisa Correia MD  Authorized by: Lisa Correia MD    Preanesthetic Checklist  Completed: patient identified, IV checked, site marked, risks and benefits discussed, surgical consent, monitors and equipment checked, pre-op evaluation and timeout performed  Peripheral Block  Patient position: supine  Prep: ChloraPrep  Patient monitoring: heart rate, cardiac monitor, continuous pulse ox, continuous capnometry and frequent blood pressure checks  Block type: adductor canal  Laterality: left  Injection technique: single shot  Needle  Needle type: Stimuplex   Needle gauge: 20 G  Needle length: 4 in  Needle localization: ultrasound guidance and anatomical landmarks   -ultrasound image captured on disc.  Assessment  Injection assessment: negative aspiration, negative parasthesia and local visualized surrounding nerve  Paresthesia pain: none  Heart rate change: no  Slow fractionated injection: yes  Pain Tolerance: comfortable throughout block and no complaints  Medications:    Medications: bupivacaine (pf) (MARCAINE) injection 0.25% - Perineural   20 mL - 12/10/2024 9:21:00 AM    Additional Notes  VSS.  RN monitoring vitals throughout procedure.  Patient tolerated procedure well.    Ultrasound guidance used to visualize the nerve bundle and sheath as well as confirm needle placement and deposition of the local anesthetic.  (1) Under ultrasound guidance, needle was inserted and placed in close proximity to the nerve bundle  (2) Ultrasound was also used to visualize the spread of the anesthetic in  close proximity to the femoral artery  (3) The nerves appeared anatomically normal  (4) There were no apparent abnormal pathological findings    Ultrasound photos archived.  Pt tolerated procedure well. There were no immediate complications.

## 2024-12-10 NOTE — PT/OT/SLP EVAL
Physical Therapy Evaluation    Patient Name:  Zachery Cordero   MRN:  [id number]    Recommendations:     Discharge Recommendations: Low Intensity Therapy   Discharge Equipment Recommendations: walker, rolling   Barriers to discharge: None    Assessment:     Zachery Cordero is a 64 y.o. male admitted with a medical diagnosis of Primary osteoarthritis of right knee.  He presents with the following impairments/functional limitations: decreased lower extremity function, pain, decreased ROM, edema, orthopedic precautions .    Rehab Prognosis: Good; patient would benefit from acute skilled PT services to address these deficits and reach maximum level of function.    Recent Surgery: Procedure(s) (LRB):  SDD - ROBOTIC ARTHROPLASTY, KNEE, TOTAL (Right) Day of Surgery    Plan:     During this hospitalization, patient to be seen BID to address the identified rehab impairments via gait training, therapeutic activities, therapeutic exercises and progress toward the following goals:    Plan of Care Expires:  12/16/24    Subjective     Chief Complaint: R knee pain  Patient/Family Comments/goals:   Pain/Comfort:  Location - Side 1: Right  Location 1: knee  Pain Addressed 1: Pre-medicate for activity, Reposition, Distraction, Cessation of Activity    Patients cultural, spiritual, Mandaen conflicts given the current situation:      Living Environment:  Pt lives in single story home with wife, only small threshold to enter   Prior to admission, patients level of function was ind.  Equipment used at home: none.  DME owned (not currently used): rolling walker.  Upon discharge, patient will have assistance from wife.    Objective:     Communicated with nurse prior to session.  Patient found supine with peripheral IV, telemetry  upon PT entry to room.    General Precautions: Standard, fall  Orthopedic Precautions:RLE weight bearing as tolerated   Braces:    Respiratory Status: Room air    Exams:  RLE ROM:     (In degrees) AROM PROM   R  "knee flexion 100 105   R knee extension 0       RLE Strength: NT dt sx side  LLE ROM: WFL  LLE Strength: WFL    Functional Mobility:  Bed Mobility:     Supine to Sit: stand by assistance  Transfers:     Sit to Stand:  stand by assistance with rolling walker  Car Transfer: stand by assistance with  rolling walker  using  Step Transfer  Gait: Pt ambulated 250 ft,200 ft w rw and SBA, using step through gait pattern at slow pace.  Stairs:  Pt ascended/descended 3 stair(s) and 4" curb step with Rolling Walker with bilateral handrails with Contact Guard Assistance.         Treatment & Education:  Pt edu on total knee protocol and importance of frequent mobility  Pt completed seated TKA therex x10 AAROM  Pt completed prehab prior to sx    Patient left up in chair with all lines intact, call button in reach, nurse notified, and wife present.    GOALS:   Multidisciplinary Problems       Physical Therapy Goals          Problem: Physical Therapy    Goal Priority Disciplines Outcome Interventions   Physical Therapy Goal     PT, PT/OT Progressing    Description: Pt will improve functional independence by performing:    Bed mobility: SBA  Sit to stand: SBA with rolling walker  Bed to chair: SBA with Stand Step  with rolling walker   Car Transfer: SBA with rolling walker  Ambulation x 200'  feet with SBA and rolling walker  1 Step (Curb): Min A  and rolling walker  3 Steps: Min A  and B HR  right knee AROM flexion (in degrees): 90  right knee AROM extension (in degrees): 0   Independent with total knee HEP                          History:     Past Medical History:   Diagnosis Date    Arthritis     Dry eye     ED (erectile dysfunction)     Hearing loss     wears hearing aids    HLD (hyperlipidemia)     HTN (hypertension)     Hypothyroidism, unspecified     Personal history of colonic polyps 06/20/2018    Colonoscopy Report    Unspecified sensorineural hearing loss        Past Surgical History:   Procedure Laterality Date    " CATARACT EXTRACTION W/  INTRAOCULAR LENS IMPLANT Right     COLONOSCOPY  06/20/2018    COLONOSCOPY W/ POLYPECTOMY  06/27/2023    hearing aids      KNEE ARTHROSCOPY Right     MOUTH SURGERY      salivary gland    ROBOTIC ARTHROPLASTY, KNEE Left 06/11/2024    Procedure: SDD- ROBOTIC ARTHROPLASTY, KNEE, TOTAL;  Surgeon: Pablito Abad Jr., MD;  [facility];  Service: Orthopedics;  Laterality: Left;       Time Tracking:     PT Received On:    PT Start Time: 1441     PT Stop Time: 1504  PT Total Time (min): 23 min     Billable Minutes: Evaluation 23      12/10/2024

## 2024-12-10 NOTE — OP NOTE
DATE OF SERVICE: 12/10/2024    SURGEON: Pablito Abad MD    ASSISTANT: Kirti Espinal, nurse practitioner.  Mrs. Espinal was essential in manipulating the knee while perform the arthroplasty, retraction, and closure    PREOPERATIVE DIAGNOSIS:   Right knee osteoarthritis    POSTOPERATIVE DIAGNOSIS:   Right knee osteoarthritis    PROCEDURE PERFORMED:  1. Right total knee arthroplasty with Willie robot assistance     ESTIMATED BLOOD LOSS: Minimal    COMPLICATIONS: None.    TOURNIQUET TIME: 82 minutes.    ANTIBIOTICS: Ancef    IMPLANTS: Vikki Triathlon: 7 Femur, 6 Tibia, 9 Poly       INDICATIONS FOR PROCEDURE: Zachery Cordero is a 64 y.o. year old who has had ongoing right knee pain. The patient has had to limit activity that is impacting daily activities.  Conservative measures have been attempted, but unfortunately the pain and limited function has persisted.  Radiographs and a CT scan show advanced arthrosis.  I discussed with the patient the risks and benefits of a knee arthroplasty.  After discussion, the patient has elected to proceed    OPERATIVE REPORT: Zachery Cordero was initially seen in the preoperative holding area where history and physical were reviewed without change. The operative leg was marked, consents were reviewed, and any questions were answered for the patient and family. The patient was then taken back to the operating room, placed supine on the operating table with all bony prominences padded. Then a nonsterile tourniquet was placed around the upper thigh. The patient was induced under spinal anesthesia. The operative lower extremity was prepped and draped in standard sterile fashion. A timeout led by the surgeon was performed, and preoperative antibiotics were given. The limp was exsanguinated with gravity. The tourniquet was raised to 100 mmHg over the systolic blood pressure.    I started with a standard approach to the knee sharply incising through skin and subcutaneous tissue.  I developed  flaps and made a medial parapatellar arthrotomy.  The knee was inspected and there was advanced arthrosis in the medial compartment and lateral compartment. The patella cartilage was intact and maintained.  After appropriate exposure, I dissected medially and lifted off the medial capsule of the knee in order to correct some of the varus deformity.  I then placed the arrays for the [employer] robot system.  I establish a hip center and ankle center.  I then mapped out the cartilage and bony anatomy.  I removed osteophytes.  Use the robot saw in order to create my femoral and tibial cuts.  Following this I trialed my components.  Trialing revealed full range of motion and stability throughout the knee.  Happy with this I irrigated out the knee fully. I then implanted my final implants.  I again irrigated out the knee and used a Chlorahexadine bath.  I injected joint juice around the capsule.  Again, I took the knee through range of motion and was stable in both flexion and extension.  Happy with this and began closure.    The medial parapatellar arthrotomy was closed with Vicryl and Stratafix sutures.  Subcutaneous layer was also closed with Monocryl sutures.  The skin was closed with a running Monocryl.  Steristrips were used.  A sterile dressing was placed, and the tourniquet was deflated after 82 minutes. The patient was awakened from anesthesia and taken to the postoperative care unit in stable condition.

## 2024-12-10 NOTE — DISCHARGE INSTRUCTIONS
[facility]  [address]  [place] [address]  Phone [phone]       /      Fax [phone]  SURGEON: Dr. Abad    After discharge, all questions or concerns should be handled at your surgeon's office ([phone]). If it is a weekend or after hours, you will get the surgeon on call.     Discharge Medications:    PAIN MANAGEMENT: Next Dose Available   Toradol/Ketorolac 10mg (Anti-inflammatory) - take every 8 hours, around the clock, for the next 5 days   12/10/2024 at 6pm   Robaxin/Methocarbamol 750mg (Muscle Relaxer) - Every 6-8 hours AS NEEDED for muscle spasms, thigh pain or additional pain control   Can take anytime.   Percocet 5/325mg (Oxycodone/Acetaminophen) (Pain Med) - every 4-6 hours AS NEEDED for pain   Can take anytime.   COMPLICATION PREVENTION MEDS: Next Dose DUE   Aspirin 81mg twice a day for 6 weeks post-op for blood clot prevention   TOMORROW MORNING   Miralax 17gm or Senokot S/Marilu-Colace 8.6/50mg - 2 tablets once or twice a day while on narcotics and muscle relaxers for constipation prevention   TONIGHT    NOZIN NASAL  - twice a day for 2 weeks or until supply runs out, whichever comes first. (Infection Prevention)    Duricef/Cefadroxil 500mg ( antibiotic) - twice a day for 2 weeks post-op for Infection Prevention  PM on 12/10/2024      PM on 12/10/24   Take a medication once or twice a day while taking TORADOL and Aspirin at the same time to prevent heartburn   PM on 12/10/2024     Total Knee Replacement  PAIN MEDICATIONS/PAIN MANAGEMENT:  Toradol/Ketorolac 10mg (anti-inflammatory) - take every 8 hours around the clock for the next 5 days.    While on Toradol and Aspirin (blood thinner) at the same time, take a medication once or twice a day to protect your stomach (for the next 5 days) (Examples: Nexium, Prilosec, Prevacid, Omeprazole, Pepcid...etc). If you start having intolerable stomach issues, discontinue the Toradol completely.      Percocet 5/325 mg (oxycodone/acetaminophen) (pain pill)- You can take 1/2 to 1 whole tablet every 4-6 hours as needed for pain.  If the pain is mild take 1/2 of a pill  Once you start taking a half of a pain pill, you can take a Tylenol 325 mg with each dose for a little extra pain relief without narcotic side effects.  Gradually decrease the use as the pain lessens.  As you decrease the Percocet, increase the Tylenol.    **NO MORE THAN 3000mg OF TYLENOL IN 24 HOURS**.     Robaxin/Methocarbamol 750mg (muscle relaxer)- you can take every 6-8 hours as needed for muscle spasms, thigh pain and stiffness, additional pain control or breakthrough pain medications. This medication is helpful for pain control while lessening your need for narcotics. Please reduce the use gradually as the pain and spasms lessen. DO NOT TAKE AT THE SAME TIME AS A PAIN PILL. YOU WILL BE BETTER SERVED WITH 2 HOURS BETWEEN PAIN PILL AND MUSCLE RELAXER.     Use the medication log in your discharge packet to keep track of your medications.    **Other things that help with pain control is WALKING, COMPRESSION WRAP, ICE and ELEVATION!!**    BLOOD CLOT PREVENTION:   Aspirin 81mg - twice a day for 6 weeks post-op. Start on the morning of 12/11/24. Stop on 1/22/24.   If you were taking a Baby Aspirin 81mg prior to surgery, ok to restart after 6 weeks - 1/23/24.    You need to continuing wearing your compression stockings (ESTUARDO Hose - ThromboEmbolic Disease Prevention Device) for the next 2-6 weeks post-op. It is ok to remove them for hygiene and at bedtime.   Hand wash and Dry. **If the swelling persists in the legs after you stop wearing the Estuardo hose, continue to wear them until the swelling decreases.**  REMOVE STOCKINGS AT LEAST DAILY FOR SKIN ASSESSMENT.   Do NOT let the stockings roll down, creating a tourniquet around the back of your knee and/or ankle. If you need to, leave the excess at the bottom of the stocking.   The best thing you can do  to prevent blood clots is to walk around as much as possible, AT LEAST EVERY 1-2 HOURS.       CONSTIPATION PREVENTION:   Miralax or Senokot S/Marilu-Colace and Stool softeners EVERY DAY while on pain meds.  Use other more aggressive over the counter LAXATIVES as needed for constipation (Examples: Milk of Magnesia, Dulcolax tabs or suppository, Magnesium Citrate, Fleet's Enema...etc.)   Drink lots of water.  Increase Fiber in diet.  Increase walking distance each day  DO NOT GO MORE THAN 2 DAYS WITHOUT HAVING A BOWEL MOVEMENT!      ACTIVITY:  You will be FULL weight-bearing on your operative leg.  Please do not take yourself off of the walker too soon.  Please allow your outpatient therapist or surgeon to guide you.  You will be range of motion as tolerated to your operative knee.  Work on bending and straightening the knee and change positions often throughout the day.  Do not put anything behind the knee, keeping it in a bent position.  Elevate your affected extremity way above the level of the heart to reduce swelling 2 to 3 times a day, 20-30 minutes at a time.  Walk around at least every 1-2 hours while awake.  No heavy lifting, pulling, pushing or straining.    SWELLING PREVENTION AND TREATMENT:   Knee - compression stockings, ace wrap and elevate operative leg way above the level of the heart - 15-30 mins, 3 times a day.   ICE AS MUCH AS POSSIBLE    THERAPY  Outpatient Physical Therapy-bring your prescription to the clinic of your choice as soon as possible.    WOUND CARE:   KNEE - BULKY ACE WRAP DRESSING - Simplify dressing in 48 hours (THURSDAY). Cut off/unwrap ace wrap and padding. You will find a Grey/Sliver Mepilex 7-day  dressing in place. This dressing can stay in place for up to 7 days. Change dressing on 12/17/24. DO NOT REMOVE UNTIL DRESSING CHANGE DATE UNLESS SOILED OR THE DRESSING IS COMPROMISED. Apply TATIANNA hose and then wrap the knee all the way up for extra added compression to the knee and  thigh.  Once Mepilex is removed, Change coverlet dressing every other day and as needed for soiling, thereafter. Notify MD of excessive wound drainage.     In 48 hours (THURSDAY, Once bulky dressing is removed) If exposed, Apply an occlusive coverlet dressing over the poke hole(s) -  Change every other day and as needed for soiling thereafter.     DO NOT WET INCISION(s) or apply any ointments, creams, lotions or antiseptics.  Ok to shower if able to keep wound from getting wet (plastic barrier, saran wrap or cling wrap and tape).   DO NOT TOUCH INCISION(s)    URINARY RETENTION:  If you start having difficulty with the catheter, notify your urologist.      PNEUMONIA PREVENTION:  Stay out of bed as much as possible and walk around every 1-2 hours.  Continue breathing exercises (Incentive Spirometry) every 1-2 hours while mobility is limited and while you are on pain pills.    FALL PREVENTION:  Wear sturdy shoes that fit well - Wearing shoes with high heels or slippery soles, or shoes that are too loose, can lead to falls. Walking around in bare feet, or only socks, can also increase your risk of falling.  Use walker as long as your surgeon and therapist recommend it  Use good lighting and  throw rugs, electrical cords, furniture and clutter (anything than can cause you to trip at home.   Non-slip rug in bathroom or shower    INFECTION PREVENTION:  Duricef/Cefadroxil 500mg (antibiotic) - twice a day for 2 weeks post-op for Infection Prevention   NOZIN ANTISEPTIC NASAL  - twice a day for 2 weeks or until supply runs out, whichever comes first. Shake bottle well, saturate cotton swab with 4 drops of antiseptic solution. Swab right nostril rim 6-8 times clockwise and counterclockwise. Take swab out, apply 2 more drops then swab left nostril rim 6-8 times clockwise and counterclockwise.   Proper handwashing before and after dressing changes. Do not wet the wound. Wound care instructions as written above.  NOTIFY MD OF EXCESSIVE WOUND DRAINAGE.  No alcohol, smoking or tobacco products  Pets should not be allowed around the wound or the dressing.   Treat UTI and skin infections as soon as possible.  Pre-medicate with antibiotics prior to dental or surgical procedures.   If you are diabetic, MAINTAIN GOOD BLOOD SUGAR CONTROL (Below 150) DURING YOUR RECOVERY. If you see high numbers, notify your primary care doctor.     Call your SURGEON'S OFFICE ([phone]) if you experience the following signs and symptoms of infection:   Unusual redness, swelling, excessive, cloudy or foul smelling drainage at the incision site.   Persistent low grade temp OR a temp greater than 102 F, unrelieved by Tylenol  Pain at surgical site, unrelieved by pain meds    Warning signs of a blood clot in your leg: (CALL YOUR SURGEON)  New onset or increasing pain in calf, new onset tenderness or redness above or below the knee or increasing swelling of your calf, ankle, or foot.  Warning signs that a blood clot has traveled to your lungs: (REPORT TO THE ER/CALL 823)  Sudden or increase in Shortness of breath, sudden onset of chest pains, or  Localized chest pain with coughing.     IF ANY ISSUES ARISE AND YOU FEEL THE NEED TO CALL YOUR PRIMARY CARE DOCTOR, PLEASE LET YOUR SURGEON KNOW AS WELL.     For emergencies, please report to OUR ([facility] or [facility] main campus) Emergency department and tell them to call YOUR SURGEON at [phone].     BEFORE MAKING ANY CHANGES TO THE MEDICAL CARE PLAN OR GOING TO THE EMERGENCY ROOM, PLEASE CONTACT THE SURGEON.    After discharge, all questions or concerns should be handled at your surgeon's office ([phone]). If it is a weekend or after hours, you will get the surgeon on call.

## 2024-12-10 NOTE — PLAN OF CARE
12/10/24 1243   Discharge Planning   Assessment Type Discharge Planning Brief Assessment   Support Systems Spouse/significant other   Equipment Currently Used at Home walker, rolling   Current Living Arrangements home   Patient/Family Anticipates Transition to home with family   Patient/Family Anticipated Services at Transition outpatient care   DME Needed Upon Discharge  walker, rolling   Discharge Plan A Home with family   Discharge Plan B Home with family     SDD. S/p TKR. Spk w pt & wife, [place] @ bedside... wife to asst w homecare. Pt has RW. Provider list given. Foc obtained.   Called referral for outpatient therapy to [facility]. Appt previously scheduled for Thurs.

## 2024-12-10 NOTE — NURSING
12/10/2024   5:12 PM    Nurse Note: Pt discharged home with wife, received all discharge instructions and verbalized understanding, pt was given leg bags to interchange for therapy days per his request, pt also given 12in Mepilex for dressing change day and multiple coverlets for reinforcement if any, pt verbalized understanding of education provided, no distress noted upon discharge.      Prior to discharge, the Patient/Support Person was educated and was able to verbalize understanding on following:    [x] Yes   [] No   [] Further Education Provided    Knee Replacement Specific Education   Pain management, Medication Management and Prescriptions  Activity level  Orthopedic precautions/braces - N/A  Complication Prevention to include: Constipation, post-op urinary retention, pneumonia, bleeding, falls, infection, DVT prophylaxis and nausea vomiting  Wound care Instructions/Bandages provided  Next dose due for pain and complication prevention medications      Perception of Care - Joint Replacement Population Total Joint Surgery List: KNEE    Patient able to verbalize one way to treat/prevent SWELLING at home   [x] Yes   [] No   [] Further Education Provided      Attending Nurse:  Chance

## 2024-12-11 ENCOUNTER — PATIENT OUTREACH (OUTPATIENT)
Dept: ADMINISTRATIVE | Facility: CLINIC | Age: 64
End: 2024-12-11
Payer: COMMERCIAL

## 2024-12-11 NOTE — PROGRESS NOTES
C3 nurse spoke with Zachery Cordero for a TCC post hospital discharge follow up call. The patient has a scheduled HOSFU appointment with Dr. Pablito Abad on 12/13/2024 @ 8 am. The patient does not have a scheduled HOSFU appointment with Josué Bass MD  within 5-7 days post hospital discharge date 12/10/2024. C3 nurse was unable to schedule HOSFU appointment in [facility].    Message sent to PCP staff requesting they contact patient and schedule follow up appointment.

## 2024-12-11 NOTE — TELEPHONE ENCOUNTER
Hospital Visit was related to knee pain, patient needs to keep Hospital F/U with specialist for this issue.  PCP OV not necessary

## 2024-12-12 ENCOUNTER — TELEPHONE (OUTPATIENT)
Dept: ORTHOPEDICS | Facility: CLINIC | Age: 64
End: 2024-12-12
Payer: COMMERCIAL

## 2024-12-12 NOTE — TELEPHONE ENCOUNTER
Ortho post op follow up call completed. ( R TKA) Pt states he had his 1st outpatient therapy session at [facility] in [place] this morning. Pain and swelling are manageable. Pt has an ice machine and states it has been a tremendous help. Pt states he is elevating his operative leg higher than heart level, wearing TATIANNA hose and ace bandage. Pt states there is a little bit of oozing on bandage, but no signs of infection. Advised he can change bandage out with no oils, creams or lotions and apply a clean bandage but pt states he prefers to let Dr Abad assess bandage and wound in the morning. Encouraged pt to walk with walker several times an hour and encouraged to call with any questions or concerns.

## 2024-12-13 ENCOUNTER — OFFICE VISIT (OUTPATIENT)
Dept: ORTHOPEDICS | Facility: CLINIC | Age: 64
End: 2024-12-13
Payer: COMMERCIAL

## 2024-12-13 VITALS — HEIGHT: 76 IN | BODY MASS INDEX: 31.62 KG/M2 | WEIGHT: 259.69 LBS

## 2024-12-13 DIAGNOSIS — Z96.651 HISTORY OF TOTAL RIGHT KNEE REPLACEMENT: Primary | ICD-10-CM

## 2024-12-13 LAB — VIEW PATHOLOGY REPORT (RELIAPATH): NORMAL

## 2024-12-13 RX ORDER — TRAMADOL HYDROCHLORIDE 50 MG/1
50 TABLET ORAL EVERY 8 HOURS PRN
Qty: 20 TABLET | Refills: 0 | Status: SHIPPED | OUTPATIENT
Start: 2024-12-13

## 2024-12-13 NOTE — PROGRESS NOTES
Chief Complaint:   Chief Complaint   Patient presents with    Right Knee - Post-op Evaluation      Rt tka sx 12/10/24 gl 01/24/25, wbat, ambulates with walker, presents with knee wrapped up, states having some drainage, states pain has been minimal, no other complaints,        History of present illness:  12/10/2024:  Right total knee arthroplasty     He returns today.  He is working in therapy.  His pain is improving.    Musculoskeletal:   His incisions healing.  He has some dry blood around the Steri-Strips.  Distally he is neurovascularly intact    Imaging:       Assessment: History of total right knee replacement        Plan:  Doing well status post above.  Continue rehab.  I will see him back in 2 weeks for wound check

## 2024-12-19 RX ORDER — HYDROCODONE BITARTRATE AND ACETAMINOPHEN 5; 325 MG/1; MG/1
1 TABLET ORAL EVERY 6 HOURS PRN
COMMUNITY

## 2024-12-19 RX ORDER — CYCLOSPORINE OPHTHALMIC SOLUTION 1 MG/ML
1 SOLUTION/ DROPS OPHTHALMIC 2 TIMES DAILY
COMMUNITY

## 2024-12-19 RX ORDER — PHENAZOPYRIDINE HYDROCHLORIDE 100 MG/1
100 TABLET, FILM COATED ORAL 3 TIMES DAILY PRN
COMMUNITY

## 2024-12-19 RX ORDER — MELOXICAM 15 MG/1
15 TABLET ORAL DAILY
COMMUNITY
End: 2025-01-08 | Stop reason: SDUPTHER

## 2024-12-19 RX ORDER — ETHYL ALCOHOL 62 %
SWAB, MEDICATED TOPICAL 2 TIMES DAILY
COMMUNITY

## 2024-12-19 RX ORDER — ONDANSETRON HYDROCHLORIDE 8 MG/1
8 TABLET, FILM COATED ORAL EVERY 8 HOURS PRN
COMMUNITY

## 2024-12-28 ENCOUNTER — HOSPITAL ENCOUNTER (EMERGENCY)
Facility: HOSPITAL | Age: 64
Discharge: HOME | End: 2024-12-28
Attending: EMERGENCY MEDICINE
Payer: COMMERCIAL

## 2024-12-28 VITALS
DIASTOLIC BLOOD PRESSURE: 92 MMHG | BODY MASS INDEX: 29.47 KG/M2 | SYSTOLIC BLOOD PRESSURE: 156 MMHG | HEART RATE: 100 BPM | OXYGEN SATURATION: 96 % | TEMPERATURE: 99 F | HEIGHT: 76 IN | WEIGHT: 242 LBS | RESPIRATION RATE: 18 BRPM

## 2024-12-28 DIAGNOSIS — N30.00 ACUTE CYSTITIS WITHOUT HEMATURIA: ICD-10-CM

## 2024-12-28 DIAGNOSIS — T83.091A OBSTRUCTION OF FOLEY CATHETER, INITIAL ENCOUNTER: Primary | ICD-10-CM

## 2024-12-28 LAB
BACTERIA #/AREA URNS AUTO: ABNORMAL /HPF
BILIRUB UR QL STRIP.AUTO: NEGATIVE
CLARITY UR: ABNORMAL
COLOR UR AUTO: ABNORMAL
GLUCOSE UR QL STRIP: NEGATIVE
HGB UR QL STRIP: ABNORMAL
KETONES UR QL STRIP: NEGATIVE
LEUKOCYTE ESTERASE UR QL STRIP: ABNORMAL
NITRITE UR QL STRIP: POSITIVE
PH UR STRIP: 6 [PH]
PROT UR QL STRIP: NEGATIVE
RBC #/AREA URNS AUTO: ABNORMAL /HPF
SP GR UR STRIP.AUTO: 1.02 (ref 1–1.03)
SQUAMOUS #/AREA URNS AUTO: ABNORMAL /HPF
UROBILINOGEN UR STRIP-ACNC: 0.2
WBC #/AREA URNS AUTO: >=100 /HPF

## 2024-12-28 PROCEDURE — 81003 URINALYSIS AUTO W/O SCOPE: CPT | Performed by: EMERGENCY MEDICINE

## 2024-12-28 PROCEDURE — 99283 EMERGENCY DEPT VISIT LOW MDM: CPT

## 2024-12-28 PROCEDURE — 87077 CULTURE AEROBIC IDENTIFY: CPT | Performed by: EMERGENCY MEDICINE

## 2024-12-28 RX ORDER — CEPHALEXIN 500 MG/1
500 CAPSULE ORAL 4 TIMES DAILY
Qty: 28 CAPSULE | Refills: 0 | Status: SHIPPED | OUTPATIENT
Start: 2024-12-28 | End: 2025-01-04

## 2024-12-28 NOTE — ED PROVIDER NOTES
NAME:  Zachery Cordero  CSN:     [id number]  MRN:    [id number]  ADMIT DATE: 12/28/2024        eMERGENCY dEPARTMENT eNCOUnter    CHIEF COMPLAINT    Chief Complaint   Patient presents with    whelan cath problem     Pt reports whelan cath is clogged       HPI      Zachery Cordero is a 64 y.o. male who presents to the ED with a Whelan catheter problem.  Patient states the catheter is clogged and he feels like his bladder is not fully emptying.  He reports recently finished antibiotics        ALLERGIES    Review of patient's allergies indicates:  No Known Allergies    PAST MEDICAL HISTORY  Past Medical History:   Diagnosis Date    Arthritis     Bladder stone     Does use hearing aid     Dry eye     ED (erectile dysfunction)     Enlarged prostate with urinary obstruction     Hearing loss     wears hearing aids    HLD (hyperlipidemia)     HTN (hypertension)     Hypothyroidism, unspecified     Nocturia     Obesity     Personal history of colonic polyps 06/20/2018    Colonoscopy Report    Unspecified sensorineural hearing loss     Urinary catheter in place     Uses walker        SURGICAL HISTORY    Past Surgical History:   Procedure Laterality Date    CATARACT EXTRACTION W/  INTRAOCULAR LENS IMPLANT Right     COLONOSCOPY  06/20/2018    COLONOSCOPY W/ POLYPECTOMY  06/27/2023    KNEE ARTHROSCOPY W/ MENISCAL REPAIR Right     MOUTH SURGERY      salivary gland    ROBOTIC ARTHROPLASTY, KNEE Left 06/11/2024    Procedure: SDD- ROBOTIC ARTHROPLASTY, KNEE, TOTAL;  Surgeon: Pablito Abad Jr., MD;  Location: [facility] OR;  Service: Orthopedics;  Laterality: Left;    ROBOTIC ARTHROPLASTY, KNEE Right 12/10/2024    Procedure: SDD - ROBOTIC ARTHROPLASTY, KNEE, TOTAL;  Surgeon: Pablito Abad Jr., MD;  Location: [facility] OR;  Service: Orthopedics;  Laterality: Right;       SOCIAL HISTORY    Social History     Socioeconomic History    Marital status:    Tobacco Use    Smoking status: Some Days     Types: Cigars     Passive exposure: Yes     Smokeless tobacco: Never    Tobacco comments:     1-2 cigars a month.   Substance and Sexual Activity    Alcohol use: Yes     Alcohol/week: 10.0 standard drinks of alcohol     Types: 10 Cans of beer per week     Comment: 1-2 a day and sometimes none    Drug use: Never    Sexual activity: Yes     Partners: Female     Birth control/protection: Post-menopausal     Social Drivers of Health     Financial Resource Strain: Low Risk  (9/11/2024)    Overall Financial Resource Strain (CARDIA)     Difficulty of Paying Living Expenses: Not hard at all   Food Insecurity: No Food Insecurity (9/11/2024)    Hunger Vital Sign     Worried About Running Out of Food in the Last Year: Never true     Ran Out of Food in the Last Year: Never true   Transportation Needs: No Transportation Needs (6/24/2024)    TRANSPORTATION NEEDS     Transportation : No   Physical Activity: Sufficiently Active (9/11/2024)    Exercise Vital Sign     Days of Exercise per Week: 4 days     Minutes of Exercise per Session: 50 min   Recent Concern: Physical Activity - Insufficiently Active (6/24/2024)    Exercise Vital Sign     Days of Exercise per Week: 2 days     Minutes of Exercise per Session: 10 min   Stress: No Stress Concern Present (9/11/2024)    [employer] - Occupational Stress Questionnaire     Feeling of Stress : Not at all   Housing Stability: Low Risk  (9/11/2024)    Housing Stability Vital Sign     Unable to Pay for Housing in the Last Year: No     Homeless in the Last Year: No       FAMILY HISTORY    Family History   Problem Relation Name Age of Onset    Hypertension Mother      Hypertension Father Jaren Dada Cordero     Brain aneurysm Father Jaren Dada Cordero     Arthritis Father Jaren Ruizreece        REVIEW OF SYSTEMS   ROS  All Systems otherwise negative except as noted in the History of Present Illness.        PHYSICAL EXAM    Reviewed Triage Note  VITAL SIGNS:   ED Triage Vitals [12/28/24 0653]   Encounter  "Vitals Group      BP (!) 156/92      Systolic BP Percentile       Diastolic BP Percentile       Pulse 100      Resp 18      Temp 98.7 °F (37.1 °C)      Temp Source Oral      SpO2 96 %      Weight 242 lb      Height 6' 4"      Head Circumference       Peak Flow       Pain Score       Pain Loc       Pain Education       Exclude from Growth Chart        Patient Vitals for the past 24 hrs:   BP Temp Temp src Pulse Resp SpO2 Height Weight   12/28/24 0653 (!) 156/92 98.7 °F (37.1 °C) Oral 100 18 96 % 6' 4" (1.93 m) 109.8 kg (242 lb)           Physical Exam    Constitutional:  Well-developed, well-nourished. No acute distress  HENT:  Normocephalic, atraumatic.  Eyes:  EOMI. Conjunctiva normal without discharge.   Neck: Normal range of motion.No stridor. No meningismus.   Respiratory:  No respiratory distress, retractions, or conversational dyspnea. Cardiovascular:  Normal heart rate. No pitting lower extremity edema.   Musculoskeletal:  No gross deformity or limited range of motion of all major joints.   Integument:  Warm and dry. No rash.  Neurologic:  Normal motor function. No focal deficits noted. Alert and Interactive.  Psychiatric:  Affect normal. Mood normal.         LABS  Pertinent labs reviewed. (See chart for details)   Labs Reviewed   URINALYSIS, REFLEX TO URINE CULTURE - Abnormal       Result Value    Color, UA Straw      Appearance, UA Cloudy (*)     Specific Gravity, UA 1.020      pH, UA 6.0      Protein, UA Negative      Glucose, UA Negative      Ketones, UA Negative      Blood, UA Large (*)     Bilirubin, UA Negative      Urobilinogen, UA 0.2      Nitrites, UA Positive (*)     Leukocyte Esterase, UA Large (*)    URINALYSIS, MICROSCOPIC - Abnormal    Bacteria, UA Many (*)     RBC, UA 0-5      WBC, UA >=100 (*)     Squamous Epithelial Cells, UA None Seen     CULTURE, URINE         RADIOLOGY    Imaging Results    None         PROCEDURES    Procedures      EKG     Interpreted by ERP:         ED COURSE & MEDICAL " DECISION MAKING    Pertinent & Imaging studies reviewed. (See chart for details and specific orders.)        Medications - No data to display              DISPOSITION  Patient discharged in stable condition at No discharge date for patient encounter.      DISCHARGE INSTRUCTIONS & MEDS       Medication List        START taking these medications      cephALEXin 500 MG capsule  Commonly known as: KEFLEX  Take 1 capsule (500 mg total) by mouth 4 (four) times daily. for 7 days            ASK your doctor about these medications      aspirin 81 MG EC tablet  Commonly known as: ECOTRIN  Take 1 tablet (81 mg total) by mouth once daily.     b complex vitamins tablet     DHEA ORAL     finasteride 5 mg tablet  Commonly known as: PROSCAR     fish oil-omega-3 fatty acids 300-1,000 mg capsule     folic acid 1 MG tablet  Commonly known as: FOLVITE     glucosamine-chondroitin 500-400 mg tablet     HYDROcodone-acetaminophen 5-325 mg per tablet  Commonly known as: NORCO     levothyroxine 50 MCG tablet  Commonly known as: SYNTHROID  TAKE 1 TABLET BY MOUTH DAILY     meloxicam 15 MG tablet  Commonly known as: MOBIC     NON FORMULARY MEDICATION     NON FORMULARY MEDICATION     NOZIN NASAL  62 % Kit  Generic drug: ethyl alcohoL     ondansetron 8 MG tablet  Commonly known as: ZOFRAN     ONE DAILY MULTIVITAMIN per tablet  Generic drug: multivitamin     oxyCODONE-acetaminophen 5-325 mg per tablet  Commonly known as: PERCOCET  Take 1 tablet by mouth every 6 (six) hours as needed for Pain.     phenazopyridine 100 MG tablet  Commonly known as: PYRIDIUM     saw palmetto 500 MG capsule     simvastatin 20 MG tablet  Commonly known as: ZOCOR  TAKE 1 TABLET BY MOUTH EVERY DAY AT BEDTIME     tadalafiL 20 MG Tab  Commonly known as: CIALIS     tamsulosin 0.4 mg Cap  Commonly known as: FLOMAX     telmisartan 80 MG Tab  Commonly known as: MICARDIS  TAKE 1 TABLET BY MOUTH DAILY     testosterone cypionate 200 mg/mL injection  Commonly known as:  DEPOTESTOTERONE CYPIONATE  Inject 1 mL (200 mg total) into the muscle every 14 (fourteen) days.     traMADoL 50 mg tablet  Commonly known as: ULTRAM  Take 1 tablet (50 mg total) by mouth every 8 (eight) hours as needed.     VEVYE 0.1 % Drop  Generic drug: cycloSPORINE     VITAMIN C 500 MG tablet  Generic drug: ascorbic acid (vitamin C)     vitamin D 1000 units Tab  Commonly known as: VITAMIN D3     zinc gluconate 50 mg tablet               Where to Get Your Medications        These medications were sent to [facility][id number] - [address] AT [facility] [address]  [address] [facility] [address]      Phone: [phone]   cephALEXin 500 MG capsule           New Prescriptions    CEPHALEXIN (KEFLEX) 500 MG CAPSULE    Take 1 capsule (500 mg total) by mouth 4 (four) times daily. for 7 days           FINAL IMPRESSION    1. Obstruction of Ames catheter, initial encounter    2. Acute cystitis without hematuria              Blood Pressure Follow-Up Advised  Patient advised to follow up with PCP within 3-5 days for blood pressure re-check if blood pressure is equal to or greater than 120/80.         Critical care time spent with this patient (not including separately billable items) was  0 minutes.     DISCLAIMER: This note was prepared with Dragon NaturallySpeaking voice recognition transcription software. Garbled syntax, mangled pronouns, and other bizarre constructions may be attributed to that software system.      Scott Simpson MD  12/28/2024  8:59 AM           Scott Simpson MD  12/28/24 0901

## 2024-12-28 NOTE — ED NOTES
Attempted to irrigate pt's existing 14 fr whelan catheter with no success.  Whelan catheter was exchanged with a 16fr 10ml indwelling without difficulty.  1400ml clear/yellow urine drained.  Pt experienced pain relief.  Will transfer pt to a leg bag upon discharge. Pt is under the  care of a urologist and has an upcoming appt scheduled.

## 2024-12-30 ENCOUNTER — OFFICE VISIT (OUTPATIENT)
Dept: ORTHOPEDICS | Facility: CLINIC | Age: 64
End: 2024-12-30
Payer: COMMERCIAL

## 2024-12-30 ENCOUNTER — ANESTHESIA EVENT (OUTPATIENT)
Dept: SURGERY | Facility: HOSPITAL | Age: 64
End: 2024-12-30
Payer: COMMERCIAL

## 2024-12-30 VITALS
HEART RATE: 102 BPM | DIASTOLIC BLOOD PRESSURE: 84 MMHG | BODY MASS INDEX: 29.48 KG/M2 | SYSTOLIC BLOOD PRESSURE: 115 MMHG | HEIGHT: 76 IN | WEIGHT: 242.06 LBS

## 2024-12-30 DIAGNOSIS — Z96.651 HISTORY OF TOTAL RIGHT KNEE REPLACEMENT: Primary | ICD-10-CM

## 2024-12-30 LAB — BACTERIA UR CULT: ABNORMAL

## 2024-12-30 PROCEDURE — 3074F SYST BP LT 130 MM HG: CPT | Mod: CPTII,,, | Performed by: NURSE PRACTITIONER

## 2024-12-30 PROCEDURE — 3079F DIAST BP 80-89 MM HG: CPT | Mod: CPTII,,, | Performed by: NURSE PRACTITIONER

## 2024-12-30 PROCEDURE — 4010F ACE/ARB THERAPY RXD/TAKEN: CPT | Mod: CPTII,,, | Performed by: NURSE PRACTITIONER

## 2024-12-30 PROCEDURE — 3288F FALL RISK ASSESSMENT DOCD: CPT | Mod: CPTII,,, | Performed by: NURSE PRACTITIONER

## 2024-12-30 PROCEDURE — 3044F HG A1C LEVEL LT 7.0%: CPT | Mod: CPTII,,, | Performed by: NURSE PRACTITIONER

## 2024-12-30 PROCEDURE — 1101F PT FALLS ASSESS-DOCD LE1/YR: CPT | Mod: CPTII,,, | Performed by: NURSE PRACTITIONER

## 2024-12-30 PROCEDURE — 1159F MED LIST DOCD IN RCRD: CPT | Mod: CPTII,,, | Performed by: NURSE PRACTITIONER

## 2024-12-30 PROCEDURE — 99024 POSTOP FOLLOW-UP VISIT: CPT | Mod: ,,, | Performed by: NURSE PRACTITIONER

## 2024-12-30 PROCEDURE — 1125F AMNT PAIN NOTED PAIN PRSNT: CPT | Mod: CPTII,,, | Performed by: NURSE PRACTITIONER

## 2024-12-30 RX ORDER — GABAPENTIN 300 MG/1
300 CAPSULE ORAL NIGHTLY
Qty: 30 CAPSULE | Refills: 0 | Status: SHIPPED | OUTPATIENT
Start: 2024-12-30

## 2024-12-30 NOTE — PRE-PROCEDURE INSTRUCTIONS
"[facility]: Outpatient Surgery  Preprocedure Check-In Instructions     Your arrival time for your surgery or procedure is _5 am_____.  We ask patients to arrive about 2 hours before surgery to allow for enough time to review your health history & medications, start your IV, complete any outstanding labwork or tests, and meet your Anesthesiologist.    Expectations: "Because of inconsistent procedure completion times, an unexpected wait may occur. The Physicians would like you to be here to prepare in the event they run ahead of time. We will make you as comfortable as possible and keep you informed. We apologize in advance if this happens."    You will arrive at [facility], [address], [place].  Enter through the [facility] entrance next to the Emergency Room, and come to the 6th floor to the Outpatient Surgery Department.     Visitory Policy:  You are allowed 2 adult visitors to be with you in the hospital. All hospital visitors should be in good current health.  No small children.     What to Bring:  Please have your ID, insurance cards, and all home medication bottles with you at check in.  Bring your CPAP machine if one is used at home.     Fasting:  Nothing to eat after midnight the night before your procedure. This includes no gum and/or tobacco products. You may have clear liquids limited to only: WATER, GATORADE, POWERADE and children can also have PEDIALYTE AND/OR APPLE JUICE , up until 2 hours before your arrival time.   Follow your doctor's instructions for taking any medications on the morning of your procedure.  If no instructions for taking medications were given, do not take any medications but bring your medications in their bottles to your procedure check in.     Follow your doctor's preoperative instructions regarding skin prep, bowel prep, bathing, or showering prior to your procedure.  If any special soaps were provided to you, please use according " to your doctor's instructions. If no instructions were given from your doctor, take a good bath or shower with antibacterial soap the night before and the morning of your procedure.  On the morning of procedure, wear loose, comfortable clothing.  No lotions, makeup, perfumes, colognes, deodorant, or jewelry to your procedure.  Removable items (glasses, contact lenses, dentures, retainers, hearing aids) need to be removed for your procedure.  Bring your storage containers for these items if you wear them.     Artificial nails, body jewelry, eyelash extensions, and/or hair extensions with metal clips are not allowed during your surgery.  If you currently wear any of these items, please arrange for them to be removed prior to your arrival to the hospital.     Outpatient or Same Day Surgeries:  Any patients receiving sedation/anesthesia are advised not to drive for 24 hours after their procedure.  We do not allow patients to drive themselves home after discharge.  If you are going home after your procedure, please have someone available to drive you home from the hospital.        You may call the Outpatient Surgery Department at [phone] with any questions or concerns.  We are looking forward to meeting you and taking great care of you for your procedure.  Thank you for choosing [facility] for your surgical needs.

## 2024-12-30 NOTE — PROGRESS NOTES
Chief Complaint:   Chief Complaint   Patient presents with    Right Knee - Wound Check     3 week sp Right TKA sx 12/10/24 gl 01/24/25, here for a wound check, present with cane, PT 3 times week, denies drainage, does have some questions: tingling issues at night (RLS), another procedure 5 am tomorrow morning (robotic prostectomy)- will that affect PT, pain in upper GI when he eats, takes tylenol 500 daily with some relief but mobic helped more       History of present illness:  12/10/2024:  Right total knee arthroplasty     He returns today.  He is working in therapy.  Pain is under good control, some tingling. Scheduled for robotic prostatectomy tomorrow, on Keflex currently.     Musculoskeletal:   His incisions healing.  He has some dry blood around the Steri-Strips.  Distally he is neurovascularly intact. No active drainage or erythema.          Assessment: History of total right knee replacement    Other orders  -     gabapentin (NEURONTIN) 300 MG capsule; Take 1 capsule (300 mg total) by mouth every evening. Take 1 capsule at night for three nights. Can increase to three times per day as needed.  Dispense: 30 capsule; Refill: 0        Plan:  Doing well status post above.  Continue rehab.  I will see him back in 3 weeks for xrays of the right knee.

## 2024-12-31 ENCOUNTER — ANESTHESIA (OUTPATIENT)
Dept: SURGERY | Facility: HOSPITAL | Age: 64
End: 2024-12-31
Payer: COMMERCIAL

## 2024-12-31 ENCOUNTER — HOSPITAL ENCOUNTER (INPATIENT)
Facility: HOSPITAL | Age: 64
LOS: 1 days | Discharge: HOME | DRG: 718 | End: 2025-01-01
Attending: UROLOGY | Admitting: UROLOGY
Payer: COMMERCIAL

## 2024-12-31 DIAGNOSIS — N13.8 ENLARGED PROSTATE WITH URINARY OBSTRUCTION: ICD-10-CM

## 2024-12-31 DIAGNOSIS — N21.0 BLADDER STONE: ICD-10-CM

## 2024-12-31 DIAGNOSIS — N40.1 BENIGN PROSTATIC HYPERPLASIA WITH URINARY FREQUENCY: Primary | ICD-10-CM

## 2024-12-31 DIAGNOSIS — R35.0 BENIGN PROSTATIC HYPERPLASIA WITH URINARY FREQUENCY: Primary | ICD-10-CM

## 2024-12-31 DIAGNOSIS — N40.1 ENLARGED PROSTATE WITH URINARY OBSTRUCTION: ICD-10-CM

## 2024-12-31 LAB
GROUP & RH: NORMAL
INDIRECT COOMBS: NORMAL
SPECIMEN OUTDATE: NORMAL

## 2024-12-31 PROCEDURE — 0TCB4ZZ EXTIRPATION OF MATTER FROM BLADDER, PERCUTANEOUS ENDOSCOPIC APPROACH: ICD-10-PCS | Performed by: UROLOGY

## 2024-12-31 PROCEDURE — 36000712 HC OR TIME LEV V 1ST 15 MIN: Performed by: UROLOGY

## 2024-12-31 PROCEDURE — 27201423 OPTIME MED/SURG SUP & DEVICES STERILE SUPPLY: Performed by: UROLOGY

## 2024-12-31 PROCEDURE — 8E0W4CZ ROBOTIC ASSISTED PROCEDURE OF TRUNK REGION, PERCUTANEOUS ENDOSCOPIC APPROACH: ICD-10-PCS | Performed by: UROLOGY

## 2024-12-31 PROCEDURE — 0VB04ZZ EXCISION OF PROSTATE, PERCUTANEOUS ENDOSCOPIC APPROACH: ICD-10-PCS | Performed by: UROLOGY

## 2024-12-31 PROCEDURE — 71000033 HC RECOVERY, INTIAL HOUR: Performed by: UROLOGY

## 2024-12-31 PROCEDURE — 25000003 PHARM REV CODE 250: Performed by: UROLOGY

## 2024-12-31 PROCEDURE — 0DBW4ZX EXCISION OF PERITONEUM, PERCUTANEOUS ENDOSCOPIC APPROACH, DIAGNOSTIC: ICD-10-PCS | Performed by: UROLOGY

## 2024-12-31 PROCEDURE — 36000713 HC OR TIME LEV V EA ADD 15 MIN: Performed by: UROLOGY

## 2024-12-31 PROCEDURE — 37000009 HC ANESTHESIA EA ADD 15 MINS: Performed by: UROLOGY

## 2024-12-31 PROCEDURE — 37000008 HC ANESTHESIA 1ST 15 MINUTES: Performed by: UROLOGY

## 2024-12-31 PROCEDURE — 25000003 PHARM REV CODE 250

## 2024-12-31 PROCEDURE — 11000001 HC ACUTE MED/SURG PRIVATE ROOM

## 2024-12-31 PROCEDURE — 86901 BLOOD TYPING SEROLOGIC RH(D): CPT | Performed by: UROLOGY

## 2024-12-31 PROCEDURE — 71000039 HC RECOVERY, EACH ADD'L HOUR: Performed by: UROLOGY

## 2024-12-31 PROCEDURE — 63600175 PHARM REV CODE 636 W HCPCS: Performed by: ANESTHESIOLOGY

## 2024-12-31 PROCEDURE — C9290 INJ, BUPIVACAINE LIPOSOME: HCPCS | Performed by: UROLOGY

## 2024-12-31 PROCEDURE — 36415 COLL VENOUS BLD VENIPUNCTURE: CPT | Performed by: UROLOGY

## 2024-12-31 PROCEDURE — 71000015 HC POSTOP RECOV 1ST HR: Performed by: UROLOGY

## 2024-12-31 PROCEDURE — 63600175 PHARM REV CODE 636 W HCPCS: Performed by: UROLOGY

## 2024-12-31 PROCEDURE — 63600175 PHARM REV CODE 636 W HCPCS: Mod: JZ,JG | Performed by: UROLOGY

## 2024-12-31 PROCEDURE — 63600175 PHARM REV CODE 636 W HCPCS

## 2024-12-31 RX ORDER — METHOCARBAMOL 100 MG/ML
1000 INJECTION, SOLUTION INTRAMUSCULAR; INTRAVENOUS ONCE
Status: COMPLETED | OUTPATIENT
Start: 2024-12-31 | End: 2024-12-31

## 2024-12-31 RX ORDER — BUPIVACAINE 13.3 MG/ML
INJECTION, SUSPENSION, LIPOSOMAL INFILTRATION
Status: DISCONTINUED | OUTPATIENT
Start: 2024-12-31 | End: 2024-12-31 | Stop reason: HOSPADM

## 2024-12-31 RX ORDER — HYDROMORPHONE HYDROCHLORIDE 2 MG/ML
INJECTION, SOLUTION INTRAMUSCULAR; INTRAVENOUS; SUBCUTANEOUS
Status: DISCONTINUED | OUTPATIENT
Start: 2024-12-31 | End: 2024-12-31

## 2024-12-31 RX ORDER — BUPIVACAINE HYDROCHLORIDE 5 MG/ML
INJECTION, SOLUTION EPIDURAL; INTRACAUDAL
Status: DISCONTINUED | OUTPATIENT
Start: 2024-12-31 | End: 2024-12-31 | Stop reason: HOSPADM

## 2024-12-31 RX ORDER — MEPERIDINE HYDROCHLORIDE 25 MG/ML
12.5 INJECTION INTRAMUSCULAR; INTRAVENOUS; SUBCUTANEOUS EVERY 10 MIN PRN
Status: DISCONTINUED | OUTPATIENT
Start: 2024-12-31 | End: 2024-12-31 | Stop reason: HOSPADM

## 2024-12-31 RX ORDER — PROPOFOL 10 MG/ML
VIAL (ML) INTRAVENOUS
Status: DISCONTINUED | OUTPATIENT
Start: 2024-12-31 | End: 2024-12-31

## 2024-12-31 RX ORDER — FENTANYL CITRATE 50 UG/ML
INJECTION, SOLUTION INTRAMUSCULAR; INTRAVENOUS
Status: DISCONTINUED | OUTPATIENT
Start: 2024-12-31 | End: 2024-12-31

## 2024-12-31 RX ORDER — MIDAZOLAM HYDROCHLORIDE 1 MG/ML
INJECTION INTRAMUSCULAR; INTRAVENOUS
Status: DISCONTINUED | OUTPATIENT
Start: 2024-12-31 | End: 2024-12-31

## 2024-12-31 RX ORDER — DEXAMETHASONE SODIUM PHOSPHATE 4 MG/ML
INJECTION, SOLUTION INTRA-ARTICULAR; INTRALESIONAL; INTRAMUSCULAR; INTRAVENOUS; SOFT TISSUE
Status: DISCONTINUED | OUTPATIENT
Start: 2024-12-31 | End: 2024-12-31

## 2024-12-31 RX ORDER — HYDROMORPHONE HYDROCHLORIDE 2 MG/ML
0.4 INJECTION, SOLUTION INTRAMUSCULAR; INTRAVENOUS; SUBCUTANEOUS EVERY 5 MIN PRN
Status: DISCONTINUED | OUTPATIENT
Start: 2024-12-31 | End: 2024-12-31 | Stop reason: HOSPADM

## 2024-12-31 RX ORDER — MUPIROCIN 20 MG/G
OINTMENT TOPICAL 2 TIMES DAILY
Status: DISCONTINUED | OUTPATIENT
Start: 2025-01-01 | End: 2025-01-01 | Stop reason: HOSPADM

## 2024-12-31 RX ORDER — HYOSCYAMINE SULFATE 0.12 MG/1
0.12 TABLET SUBLINGUAL EVERY 4 HOURS PRN
Status: DISCONTINUED | OUTPATIENT
Start: 2024-12-31 | End: 2025-01-01 | Stop reason: HOSPADM

## 2024-12-31 RX ORDER — CEFAZOLIN 2 G/1
2 INJECTION, POWDER, FOR SOLUTION INTRAMUSCULAR; INTRAVENOUS
Status: COMPLETED | OUTPATIENT
Start: 2024-12-31 | End: 2025-01-01

## 2024-12-31 RX ORDER — LABETALOL HYDROCHLORIDE 5 MG/ML
10 INJECTION, SOLUTION INTRAVENOUS ONCE
Status: COMPLETED | OUTPATIENT
Start: 2024-12-31 | End: 2024-12-31

## 2024-12-31 RX ORDER — LIDOCAINE HYDROCHLORIDE 20 MG/ML
INJECTION INTRAVENOUS
Status: DISCONTINUED | OUTPATIENT
Start: 2024-12-31 | End: 2024-12-31

## 2024-12-31 RX ORDER — DIPHENHYDRAMINE HYDROCHLORIDE 50 MG/ML
25 INJECTION INTRAMUSCULAR; INTRAVENOUS EVERY 6 HOURS PRN
Status: DISCONTINUED | OUTPATIENT
Start: 2024-12-31 | End: 2024-12-31 | Stop reason: HOSPADM

## 2024-12-31 RX ORDER — ACETAMINOPHEN 325 MG/1
650 TABLET ORAL EVERY 4 HOURS PRN
Status: DISCONTINUED | OUTPATIENT
Start: 2024-12-31 | End: 2025-01-01 | Stop reason: HOSPADM

## 2024-12-31 RX ORDER — HYDROMORPHONE HYDROCHLORIDE 2 MG/ML
1 INJECTION, SOLUTION INTRAMUSCULAR; INTRAVENOUS; SUBCUTANEOUS
Status: DISCONTINUED | OUTPATIENT
Start: 2024-12-31 | End: 2025-01-01 | Stop reason: HOSPADM

## 2024-12-31 RX ORDER — ZOLPIDEM TARTRATE 5 MG/1
5 TABLET ORAL NIGHTLY PRN
Status: DISCONTINUED | OUTPATIENT
Start: 2024-12-31 | End: 2025-01-01 | Stop reason: HOSPADM

## 2024-12-31 RX ORDER — LEVOTHYROXINE SODIUM 50 UG/1
50 TABLET ORAL DAILY
Status: DISCONTINUED | OUTPATIENT
Start: 2025-01-01 | End: 2025-01-01 | Stop reason: HOSPADM

## 2024-12-31 RX ORDER — HYDROMORPHONE HYDROCHLORIDE 2 MG/ML
0.2 INJECTION, SOLUTION INTRAMUSCULAR; INTRAVENOUS; SUBCUTANEOUS EVERY 5 MIN PRN
Status: DISCONTINUED | OUTPATIENT
Start: 2024-12-31 | End: 2024-12-31 | Stop reason: HOSPADM

## 2024-12-31 RX ORDER — HYDROCODONE BITARTRATE AND ACETAMINOPHEN 10; 325 MG/1; MG/1
1 TABLET ORAL EVERY 4 HOURS PRN
Status: DISCONTINUED | OUTPATIENT
Start: 2024-12-31 | End: 2025-01-01 | Stop reason: HOSPADM

## 2024-12-31 RX ORDER — ONDANSETRON HYDROCHLORIDE 2 MG/ML
4 INJECTION, SOLUTION INTRAVENOUS DAILY PRN
Status: DISCONTINUED | OUTPATIENT
Start: 2024-12-31 | End: 2024-12-31 | Stop reason: HOSPADM

## 2024-12-31 RX ORDER — KETOROLAC TROMETHAMINE 30 MG/ML
15 INJECTION, SOLUTION INTRAMUSCULAR; INTRAVENOUS EVERY 8 HOURS
Status: DISCONTINUED | OUTPATIENT
Start: 2024-12-31 | End: 2025-01-01 | Stop reason: HOSPADM

## 2024-12-31 RX ORDER — GABAPENTIN 300 MG/1
300 CAPSULE ORAL NIGHTLY
Status: DISCONTINUED | OUTPATIENT
Start: 2024-12-31 | End: 2025-01-01 | Stop reason: HOSPADM

## 2024-12-31 RX ORDER — ONDANSETRON 4 MG/1
8 TABLET, ORALLY DISINTEGRATING ORAL EVERY 8 HOURS PRN
Status: DISCONTINUED | OUTPATIENT
Start: 2024-12-31 | End: 2025-01-01 | Stop reason: HOSPADM

## 2024-12-31 RX ORDER — ACETAMINOPHEN 10 MG/ML
INJECTION, SOLUTION INTRAVENOUS
Status: DISCONTINUED | OUTPATIENT
Start: 2024-12-31 | End: 2024-12-31

## 2024-12-31 RX ORDER — PHENAZOPYRIDINE HYDROCHLORIDE 100 MG/1
200 TABLET, FILM COATED ORAL
Status: DISCONTINUED | OUTPATIENT
Start: 2024-12-31 | End: 2025-01-01 | Stop reason: HOSPADM

## 2024-12-31 RX ORDER — CEFAZOLIN SODIUM 2 G/50ML
2 SOLUTION INTRAVENOUS ONCE
Status: COMPLETED | OUTPATIENT
Start: 2024-12-31 | End: 2024-12-31

## 2024-12-31 RX ORDER — SODIUM CHLORIDE 9 MG/ML
INJECTION, SOLUTION INTRAVENOUS CONTINUOUS
Status: DISCONTINUED | OUTPATIENT
Start: 2024-12-31 | End: 2025-01-01 | Stop reason: HOSPADM

## 2024-12-31 RX ORDER — ROCURONIUM BROMIDE 10 MG/ML
INJECTION, SOLUTION INTRAVENOUS
Status: DISCONTINUED | OUTPATIENT
Start: 2024-12-31 | End: 2024-12-31

## 2024-12-31 RX ORDER — PHENYLEPHRINE HCL IN 0.9% NACL 1 MG/10 ML
SYRINGE (ML) INTRAVENOUS
Status: DISCONTINUED | OUTPATIENT
Start: 2024-12-31 | End: 2024-12-31

## 2024-12-31 RX ORDER — ONDANSETRON HYDROCHLORIDE 2 MG/ML
INJECTION, SOLUTION INTRAVENOUS
Status: DISCONTINUED | OUTPATIENT
Start: 2024-12-31 | End: 2024-12-31

## 2024-12-31 RX ORDER — PROMETHAZINE HYDROCHLORIDE 25 MG/1
25 TABLET ORAL EVERY 6 HOURS PRN
Status: DISCONTINUED | OUTPATIENT
Start: 2024-12-31 | End: 2025-01-01 | Stop reason: HOSPADM

## 2024-12-31 RX ADMIN — PHENAZOPYRIDINE HYDROCHLORIDE 200 MG: 100 TABLET ORAL at 02:12

## 2024-12-31 RX ADMIN — Medication 100 MCG: at 09:12

## 2024-12-31 RX ADMIN — HYDROMORPHONE HYDROCHLORIDE 0.4 MG: 2 INJECTION, SOLUTION INTRAMUSCULAR; INTRAVENOUS; SUBCUTANEOUS at 08:12

## 2024-12-31 RX ADMIN — GABAPENTIN 300 MG: 300 CAPSULE ORAL at 09:12

## 2024-12-31 RX ADMIN — LABETALOL HYDROCHLORIDE 10 MG: 5 INJECTION, SOLUTION INTRAVENOUS at 01:12

## 2024-12-31 RX ADMIN — ROCURONIUM BROMIDE 20 MG: 10 SOLUTION INTRAVENOUS at 08:12

## 2024-12-31 RX ADMIN — CEFAZOLIN 2 G: 2 INJECTION, POWDER, FOR SOLUTION INTRAMUSCULAR; INTRAVENOUS at 04:12

## 2024-12-31 RX ADMIN — SODIUM CHLORIDE, SODIUM GLUCONATE, SODIUM ACETATE, POTASSIUM CHLORIDE AND MAGNESIUM CHLORIDE: 526; 502; 368; 37; 30 INJECTION, SOLUTION INTRAVENOUS at 07:12

## 2024-12-31 RX ADMIN — HYDROMORPHONE HYDROCHLORIDE 0.2 MG: 2 INJECTION, SOLUTION INTRAMUSCULAR; INTRAVENOUS; SUBCUTANEOUS at 11:12

## 2024-12-31 RX ADMIN — ROCURONIUM BROMIDE 10 MG: 10 SOLUTION INTRAVENOUS at 09:12

## 2024-12-31 RX ADMIN — SODIUM CHLORIDE: 9 INJECTION, SOLUTION INTRAVENOUS at 02:12

## 2024-12-31 RX ADMIN — PROPOFOL 200 MG: 10 INJECTION, EMULSION INTRAVENOUS at 07:12

## 2024-12-31 RX ADMIN — ROCURONIUM BROMIDE 40 MG: 10 SOLUTION INTRAVENOUS at 08:12

## 2024-12-31 RX ADMIN — HYDROMORPHONE HYDROCHLORIDE 0.4 MG: 2 INJECTION, SOLUTION INTRAMUSCULAR; INTRAVENOUS; SUBCUTANEOUS at 10:12

## 2024-12-31 RX ADMIN — FENTANYL CITRATE 100 MCG: 50 INJECTION, SOLUTION INTRAMUSCULAR; INTRAVENOUS at 07:12

## 2024-12-31 RX ADMIN — GENTAMICIN SULFATE 120 MG: 40 INJECTION, SOLUTION INTRAMUSCULAR; INTRAVENOUS at 08:12

## 2024-12-31 RX ADMIN — Medication 100 MCG: at 08:12

## 2024-12-31 RX ADMIN — HYDROMORPHONE HYDROCHLORIDE 0.4 MG: 2 INJECTION INTRAMUSCULAR; INTRAVENOUS; SUBCUTANEOUS at 12:12

## 2024-12-31 RX ADMIN — ONDANSETRON 4 MG: 2 INJECTION INTRAMUSCULAR; INTRAVENOUS at 10:12

## 2024-12-31 RX ADMIN — ZOLPIDEM TARTRATE 5 MG: 5 TABLET ORAL at 09:12

## 2024-12-31 RX ADMIN — MIDAZOLAM HYDROCHLORIDE 2 MG: 1 INJECTION, SOLUTION INTRAMUSCULAR; INTRAVENOUS at 07:12

## 2024-12-31 RX ADMIN — Medication 200 MCG: at 08:12

## 2024-12-31 RX ADMIN — ACETAMINOPHEN 1000 MG: 10 INJECTION, SOLUTION INTRAVENOUS at 09:12

## 2024-12-31 RX ADMIN — KETOROLAC TROMETHAMINE 15 MG: 30 INJECTION, SOLUTION INTRAMUSCULAR; INTRAVENOUS at 09:12

## 2024-12-31 RX ADMIN — PHENAZOPYRIDINE HYDROCHLORIDE 200 MG: 100 TABLET ORAL at 04:12

## 2024-12-31 RX ADMIN — KETOROLAC TROMETHAMINE 15 MG: 30 INJECTION, SOLUTION INTRAMUSCULAR; INTRAVENOUS at 02:12

## 2024-12-31 RX ADMIN — HYDROMORPHONE HYDROCHLORIDE 0.2 MG: 2 INJECTION, SOLUTION INTRAMUSCULAR; INTRAVENOUS; SUBCUTANEOUS at 10:12

## 2024-12-31 RX ADMIN — HYOSCYAMINE SULFATE 0.12 MG: 0.12 TABLET SUBLINGUAL at 07:12

## 2024-12-31 RX ADMIN — DEXAMETHASONE SODIUM PHOSPHATE 8 MG: 4 INJECTION, SOLUTION INTRA-ARTICULAR; INTRALESIONAL; INTRAMUSCULAR; INTRAVENOUS; SOFT TISSUE at 07:12

## 2024-12-31 RX ADMIN — METHOCARBAMOL 1000 MG: 100 INJECTION INTRAMUSCULAR; INTRAVENOUS at 11:12

## 2024-12-31 RX ADMIN — CEFAZOLIN SODIUM 2 G: 2 SOLUTION INTRAVENOUS at 08:12

## 2024-12-31 RX ADMIN — ROCURONIUM BROMIDE 60 MG: 10 SOLUTION INTRAVENOUS at 07:12

## 2024-12-31 RX ADMIN — SUGAMMADEX 200 MG: 100 INJECTION, SOLUTION INTRAVENOUS at 10:12

## 2024-12-31 RX ADMIN — LIDOCAINE HYDROCHLORIDE 60 MG: 20 INJECTION INTRAVENOUS at 07:12

## 2024-12-31 NOTE — ANESTHESIA POSTPROCEDURE EVALUATION
Anesthesia Post Evaluation    Patient: Zachery Cordero    Procedure(s) Performed: Procedure(s) (LRB):  XI ROBOTIC PROSTATECTOMY, SIMPLE (N/A)    Final Anesthesia Type: general      Patient location during evaluation: PACU  Patient participation: Yes- Able to Participate  Level of consciousness: awake and alert  Post-procedure vital signs: reviewed and stable  Pain management: adequate  Airway patency: patent    PONV status at discharge: No PONV  Anesthetic complications: no      Cardiovascular status: hemodynamically stable  Respiratory status: unassisted and spontaneous ventilation  Hydration status: euvolemic  Follow-up not needed.              Vitals Value Taken Time   /95 12/31/24 1330   Temp  12/31/24 1400   Pulse 84 12/31/24 1330   Resp 10 12/31/24 1330   SpO2 97 % 12/31/24 1330         Event Time   Out of Recovery 13:32:00         Pain/Sheree Score: Pain Rating Prior to Med Admin: 5 (12/31/2024  1:24 PM)  Sheree Score: 9 (12/31/2024  1:32 PM)

## 2024-12-31 NOTE — ANESTHESIA PROCEDURE NOTES
Intubation    Date/Time: 12/31/2024 7:48 AM    Performed by: Eliazar Lara CRNA  Authorized by: Mark Carr,     Intubation:     Induction:  Intravenous    Intubated:  Postinduction    Mask Ventilation:  Easy with oral airway    Attempts:  2    Attempted By:  Student    Method of Intubation:  Direct    Blade:  Dukes 2    Laryngeal View Grade: Grade IIb - only the arytenoids and epiglottis seen      Attempted By (2nd Attempt):  Student    Method of Intubation (2nd Attempt):  Video laryngoscopy    Blade (2nd Attempt):  Diallo 3    Laryngeal View Grade (2nd Attempt): Grade I - full view of cords      Difficult Airway Encountered?: No      Complications:  None    Airway Device:  Oral endotracheal tube    Airway Device Size:  7.5    Style/Cuff Inflation:  Cuffed (inflated to minimal occlusive pressure)    Tube secured:  23    Secured at:  The lips    Placement Verified By:  Capnometry    Complicating Factors:  None    Findings Post-Intubation:  BS equal bilateral and atraumatic/condition of teeth unchanged

## 2024-12-31 NOTE — OP NOTE
SURGEON:  iKeran Townsend MD    PREOPERATIVE DIAGNOSIS(ES):  BPH with bladder outlet obstruction.  Bladder stones    POSTOPERATIVE DIAGNOSIS(ES):  BPH with bladder outlet obstruction.  Bladder stones  Peritoneal nodule    PROCEDURE(S)/OPERATION(S) PERFORMED:  Robot-assisted laparoscopic suprapubic prostatectomy.  Robotic assisted laparoscopic bladder stone removal  Peritoneal biopsy     ANESTHESIA:  General.    FLUIDS REPLACED:  1500 mL of crystalloid.    ESTIMATED BLOOD LOSS:  50 mL    SPECIMENS:  Prostate adenoma.   Bladder stones  Peritoneal nodule    FINDINGS:  Massive BPH with right and left lateral lobe hypertrophy and nodular BPH grossly.  The ureteral orifices were well away from the bladder neck mucosal edge.    DRAINS:  1. 22-Samoan 3-way Ames catheter with 40 mL sterile water in a balloon to gravity drainage.  2. 19-Samoan Boubacar drain in the pelvis to bulb suction.    COMPLICATIONS:  None.    SUMMARY:  After adequate general anesthetic, he was carefully positioned in low lithotomy. His arms were  tucked at his sides. All pressure points were carefully padded to prevent neuromuscular injury.  The table was placed in a steep Trendelenburg position. The abdomen and genitalia were  shaved and prepped and draped sterile fashion. A time-out was performed with two patient identifiers.  A 16-Samoan 2-way Ames catheter was placed in the bladder with 10 mL of sterile water in the  balloon, and the bladder was drained.    A Veress needle was used to access the peritoneal cavity at the umbilicus. Saline drop test and  advancement tests were negative. The abdomen was insufflated at low insufflation pressures of  CO2 gas. We insufflated with low flow and initial pressures below 4 mmHg and gradually  insufflated up to 15 mmHg. A 1 cm incision was made just above the belly button horizontally  and a 8-mm trocar was inserted. Initial laparoscopy revealed findings  as noted above.    We then placed 2 robotic trocars on  either side of the midline measuring 18 cm from the midline  at the pubic bone up to the port sites which were 9 cm lateral of the umbilicus and another robot  trocar was placed in the left lateral abdomen two finger breaths off the left ASIC. One 12 mm Surgiquest trocar in the right lateral abdomen two finger breaths off the right ASIC and another 5mm trocar in the right subcostal position. All ports were placed under direct vision.    The robot was then docked.  We then took down some adhesions of the sigmoid colon in  order to fully retract the rectum out of the pelvis.    There a peritoneal nodule that was seen in the left pelvis. This was removed and sent as a specimen.    The bladder was then incised the bladder in the midline and placed 4 2-0 vicryl sutures as stay sutures into the bladder.    There were several bladder stones which were grasped and removed. These were placed in an endocatch pouch.    We then switch the 4th arm to the robotic Tenaculum. We the incision underneath the prostate median lobe and created the poseterior dissection between the prostate capsule and the adenoma. This was then carried to the right prostate lobe then anteriorly. We dissected out the right lateral lobe using a combination of blunt dissection and electrocautery in a subcapsular fashion.     Next we exposed a portion of the left lateral lobe, and then identified the groove in the anterior commissure between the left and right lateral lobes. This was then split using blunt dissection to separate the left and right lobes in the midline anteriorly, and this exposed the catheter and urethral mucosa. Next, the apical tissue was divided under direct vision with sharp dissection using cold irene.  Once the right prostate adenoma was completely freed up it was placed in the left lower  quadrant for later specimen retrieval.    Attention was then turned to the left lateral lobe, which was then dissected out in a similar fashion as  described above. The left lateral lobe was also placed in left lower quadrant for later retrieval. The prostate fossa inside the capsule was then inspected for bleeding.     We examined the bladder neck and the ureteral orifices were well away from the bladder neck  mucosal edge and urine was seen effluxing freely from both UOs.    A Strata fix suture was used to control the bleeding and to reconstruct posterior bladder neck.  Hemostasis was excellent with the pressure lowered to 5 mmHg. The prostate fossa was irrigated and all irrigant was removed. The pressure was raised back to 15 mmHg. We placed a new catheter, a 22-Azeri 3-way catheter, into the bladder without difficulty. Next, the bladder incision was closed with 2 running 2-0 Strata fix sutures from each side. Once the bladder was closed, we placed 40 mL of sterile water in the catheter balloon and it irrigated freely of anyclot. It was  connected to continuous saline irrigation by gravity at a slow drip.    The robot patient side cart was undocked. The specimen was extracted through the assistant incision    The specimen was sent off to Pathology for permanent section. The fascia of the assistant incision was reapproximated with running 0 Polysorb suture. All wounds were infiltrated with experell and 0.5% Marcaine, a total of 50 mL. The wounds were irrigated. The deep subcutaneous layer of the umbilical incision was reapproximated with running 3-0 vicryl suture. The skin edges were reapproximated using running subcuticular 4-0 monocryl suture. Dermabond dressing was applied to all the skin incisions.     The patient tolerated procedure well. His catheter and drain were secured to the left leg. He was extubated and transported to the recovery room in stable condition. His family was  informed of the outcome following the procedure.    12.31.24  11:11

## 2024-12-31 NOTE — ANESTHESIA PREPROCEDURE EVALUATION
12/31/2024  Zachery Cordero is a 64 y.o., male.      Pre-op Assessment    I have reviewed the Patient Summary Reports.     I have reviewed the Nursing Notes. I have reviewed the NPO Status.   I have reviewed the Medications.     Review of Systems  Anesthesia Hx:  No problems with previous Anesthesia                Social:  Smoker, Alcohol Use       Cardiovascular:     Hypertension           hyperlipidemia                         Hypertension         Renal/:    BPH              Musculoskeletal:  Arthritis        Arthritis          Neurological:      Arthritis                           Endocrine:   Hypothyroidism       Hypothyroidism              Physical Exam  General: Well nourished, Cooperative and Alert    Airway:  Mallampati: II   Neck ROM: Normal ROM    Chest/Lungs:  Clear to auscultation, Normal Respiratory Rate    Heart:  Rate: Normal  Rhythm: Regular Rhythm  Sounds: Normal        Anesthesia Plan  Type of Anesthesia, risks & benefits discussed:    Anesthesia Type: Gen ETT  Intra-op Monitoring Plan: Standard ASA Monitors  Induction:  IV  Informed Consent: Informed consent signed with the Patient and all parties understand the risks and agree with anesthesia plan.  All questions answered.   ASA Score: 3  Day of Surgery Review of History & Physical: H&P Update referred to the surgeon/provider.    Ready For Surgery From Anesthesia Perspective.     .  Lab Results   Component Value Date    WBC 6.64 11/19/2024    HGB 15.3 11/19/2024    HCT 50.8 11/19/2024    MCV 90.4 11/19/2024     11/19/2024     BMP  Lab Results   Component Value Date     11/19/2024    K 4.0 11/19/2024     (H) 11/19/2024    CO2 23 11/19/2024    BUN 21.9 11/19/2024    CREATININE 1.06 11/19/2024    CALCIUM 10.8 (H) 11/19/2024    EGFRNORACEVR >60 11/19/2024   \

## 2024-12-31 NOTE — TRANSFER OF CARE
"Anesthesia Transfer of Care Note    Patient: Zachery Cordero    Procedure(s) Performed: Procedure(s) (LRB):  XI ROBOTIC PROSTATECTOMY, SIMPLE (N/A)    Patient location: PACU    Anesthesia Type: general    Transport from OR: Transported from OR on room air with adequate spontaneous ventilation    Post pain: adequate analgesia    Post assessment: no apparent anesthetic complications and tolerated procedure well    Post vital signs: stable    Level of consciousness: sedated    Nausea/Vomiting: no nausea/vomiting    Complications: none    Transfer of care protocol was followed      Last vitals: Visit Vitals  BP (!) 160/83   Pulse 90   Temp 37.3 °C (99.2 °F) (Oral)   Resp 15   Ht 6' 4" (1.93 m)   Wt 108.3 kg (238 lb 12.1 oz)   SpO2 97%   BMI 29.06 kg/m²     "

## 2025-01-01 VITALS
SYSTOLIC BLOOD PRESSURE: 132 MMHG | TEMPERATURE: 98 F | DIASTOLIC BLOOD PRESSURE: 85 MMHG | BODY MASS INDEX: 29.07 KG/M2 | WEIGHT: 238.75 LBS | RESPIRATION RATE: 18 BRPM | HEIGHT: 76 IN | OXYGEN SATURATION: 95 % | HEART RATE: 95 BPM

## 2025-01-01 PROBLEM — R35.0 BENIGN PROSTATIC HYPERPLASIA WITH URINARY FREQUENCY: Status: ACTIVE | Noted: 2024-12-31

## 2025-01-01 PROBLEM — N40.1 BENIGN PROSTATIC HYPERPLASIA WITH URINARY FREQUENCY: Status: RESOLVED | Noted: 2024-12-31 | Resolved: 2025-01-01

## 2025-01-01 PROBLEM — R35.0 BENIGN PROSTATIC HYPERPLASIA WITH URINARY FREQUENCY: Status: RESOLVED | Noted: 2024-12-31 | Resolved: 2025-01-01

## 2025-01-01 LAB
ANION GAP SERPL CALC-SCNC: 6 MEQ/L
BUN SERPL-MCNC: 19.8 MG/DL (ref 8.4–25.7)
CALCIUM SERPL-MCNC: 9.6 MG/DL (ref 8.8–10)
CHLORIDE SERPL-SCNC: 109 MMOL/L (ref 98–107)
CO2 SERPL-SCNC: 22 MMOL/L (ref 23–31)
CREAT SERPL-MCNC: 0.87 MG/DL (ref 0.72–1.25)
CREAT/UREA NIT SERPL: 23
GFR SERPLBLD CREATININE-BSD FMLA CKD-EPI: >60 ML/MIN/1.73/M2
GLUCOSE SERPL-MCNC: 89 MG/DL (ref 82–115)
HCT VFR BLD AUTO: 43.8 % (ref 42–52)
HGB BLD-MCNC: 13.7 G/DL (ref 14–18)
POTASSIUM SERPL-SCNC: 4.2 MMOL/L (ref 3.5–5.1)
SODIUM SERPL-SCNC: 137 MMOL/L (ref 136–145)

## 2025-01-01 PROCEDURE — 63600175 PHARM REV CODE 636 W HCPCS: Performed by: UROLOGY

## 2025-01-01 PROCEDURE — 36415 COLL VENOUS BLD VENIPUNCTURE: CPT | Performed by: UROLOGY

## 2025-01-01 PROCEDURE — 25000003 PHARM REV CODE 250: Performed by: UROLOGY

## 2025-01-01 PROCEDURE — 94799 UNLISTED PULMONARY SVC/PX: CPT

## 2025-01-01 PROCEDURE — 99900035 HC TECH TIME PER 15 MIN (STAT)

## 2025-01-01 PROCEDURE — 85018 HEMOGLOBIN: CPT | Performed by: UROLOGY

## 2025-01-01 PROCEDURE — 99900031 HC PATIENT EDUCATION (STAT)

## 2025-01-01 PROCEDURE — 80048 BASIC METABOLIC PNL TOTAL CA: CPT | Performed by: UROLOGY

## 2025-01-01 RX ADMIN — CEFAZOLIN 2 G: 2 INJECTION, POWDER, FOR SOLUTION INTRAMUSCULAR; INTRAVENOUS at 12:01

## 2025-01-01 RX ADMIN — KETOROLAC TROMETHAMINE 15 MG: 30 INJECTION, SOLUTION INTRAMUSCULAR; INTRAVENOUS at 04:01

## 2025-01-01 RX ADMIN — SODIUM CHLORIDE: 9 INJECTION, SOLUTION INTRAVENOUS at 12:01

## 2025-01-01 RX ADMIN — HYOSCYAMINE SULFATE 0.12 MG: 0.12 TABLET SUBLINGUAL at 12:01

## 2025-01-01 RX ADMIN — HYOSCYAMINE SULFATE 0.12 MG: 0.12 TABLET SUBLINGUAL at 04:01

## 2025-01-01 RX ADMIN — PHENAZOPYRIDINE HYDROCHLORIDE 200 MG: 100 TABLET ORAL at 09:01

## 2025-01-01 RX ADMIN — PHENAZOPYRIDINE HYDROCHLORIDE 200 MG: 100 TABLET ORAL at 12:01

## 2025-01-01 NOTE — NURSING
Patient ready for discharge, iv catheters on left and right arm removed. Patient educated on safe catheter care, warning signs to look out for and how to get help. All patients questions answered, patient verbalized no further concerns, transportation booked.

## 2025-01-01 NOTE — PLAN OF CARE
Problem: Adult Inpatient Plan of Care  Goal: Plan of Care Review  1/1/2025 1227 by Cinda Wan RN  Outcome: Met  1/1/2025 1132 by Cinda Wan RN  Outcome: Progressing  Goal: Patient-Specific Goal (Individualized)  1/1/2025 1227 by Cinda Wan RN  Outcome: Met  1/1/2025 1132 by Cinda Wan RN  Outcome: Progressing  Goal: Absence of Hospital-Acquired Illness or Injury  1/1/2025 1227 by Cinda Wan RN  Outcome: Met  1/1/2025 1132 by Cinda Wan RN  Outcome: Progressing  Goal: Optimal Comfort and Wellbeing  1/1/2025 1227 by Cinda Wan RN  Outcome: Met  1/1/2025 1132 by Cinda Wan RN  Outcome: Progressing  Goal: Readiness for Transition of Care  1/1/2025 1227 by Cinda Wan RN  Outcome: Met  1/1/2025 1132 by Cinda Wan RN  Outcome: Progressing     Problem: Wound  Goal: Optimal Coping  1/1/2025 1227 by Cinda Wan, RN  Outcome: Met  1/1/2025 1132 by Cinda Wan RN  Outcome: Progressing  Goal: Optimal Functional Ability  1/1/2025 1227 by Cinda Wan, RN  Outcome: Met  1/1/2025 1132 by Cinda Wan RN  Outcome: Progressing  Goal: Absence of Infection Signs and Symptoms  1/1/2025 1227 by Cinda Wan, RN  Outcome: Met  1/1/2025 1132 by Cinda Wan RN  Outcome: Progressing  Goal: Improved Oral Intake  1/1/2025 1227 by Cinda Wan RN  Outcome: Met  1/1/2025 1132 by Cinda Wan RN  Outcome: Progressing  Goal: Optimal Pain Control and Function  1/1/2025 1227 by Cinda Wan, RN  Outcome: Met  1/1/2025 1132 by Cinda Wan RN  Outcome: Progressing  Goal: Skin Health and Integrity  1/1/2025 1227 by Cinda Wan, RN  Outcome: Met  1/1/2025 1132 by Cinda Wan RN  Outcome: Progressing  Goal: Optimal Wound  Healing  1/1/2025 1227 by Cinda Wan RN  Outcome: Met  1/1/2025 1132 by Cinda Wan RN  Outcome: Progressing     Problem: Infection  Goal: Absence of Infection Signs and Symptoms  1/1/2025 1227 by Cinda Wan RN  Outcome: Met  1/1/2025 1132 by Cinda Wan RN  Outcome: Progressing     Problem: Fall Injury Risk  Goal: Absence of Fall and Fall-Related Injury  1/1/2025 1227 by Cinda Wan RN  Outcome: Met  1/1/2025 1132 by Cinda Wan RN  Outcome: Progressing

## 2025-01-01 NOTE — DISCHARGE SUMMARY
[facility] - [address] [facility] [address]  Urology  Discharge Summary      Patient Name: Zachery Cordero  MRN: [id number]  Admission Date: 12/31/2024  Hospital Length of Stay: 1 days  Discharge Date and Time:  01/01/2025 10:27 AM  Attending Physician: Kieran Townsend MD  Discharging Provider: Kieran Townsend MD  Primary Care Physician: Josué Bass MD    HPI:   No notes on file    Procedure(s) (LRB):  XI ROBOTIC PROSTATECTOMY, SIMPLE (N/A)     Indwelling Lines/Drains at time of discharge:   Lines/Drains/Airways       Drain  Duration                  Urethral Catheter 12/31/24 0800 Silicone 1 day                    Hospital Course (synopsis of major diagnoses, care, treatment, and services provided during the course of the hospital stay): pod1 from [facility] doing well  Tolerarting diet  Ambulating  Pain controlled  Labs and vitals are stable    Goals of Care Treatment Preferences:         Consults:     Significant Diagnostic Studies: na    Pending Diagnostic Studies:       Procedure Component Value Units Date/Time    Specimen to Pathology [[id number]] Collected: 12/31/24 0854    Order Status: Sent Lab Status: No result     Specimen: Tissue from Peritoneal, Tissue from Bladder, Tissue from Prostate             Final Active Diagnoses:      Problems Resolved During this Admission:    Diagnosis Date Noted Date Resolved POA    PRINCIPAL PROBLEM:  Benign prostatic hyperplasia with urinary frequency [N40.1, R35.0] 12/31/2024 01/01/2025 Yes         Discharged Condition: good    Disposition: Home or Self Care    Follow Up:   Follow-up Information       Kieran Townsend MD Follow up.    Specialty: Urology  Why: as scheduled  Contact information:  Maryjane Ferris [address] [id number] [phone]                           Patient Instructions:      Diet Adult Regular     Lifting restrictions     Notify your health care provider if you experience any of the following:  temperature >100.4     Notify your health  care provider if you experience any of the following:  persistent nausea and vomiting or diarrhea     Notify your health care provider if you experience any of the following:  severe uncontrolled pain     Medications:  Reconciled Home Medications:      Medication List        CONTINUE taking these medications      aspirin 81 MG EC tablet  Commonly known as: ECOTRIN  Take 1 tablet (81 mg total) by mouth once daily.     b complex vitamins tablet  Take 1 tablet by mouth once daily.     cephALEXin 500 MG capsule  Commonly known as: KEFLEX  Take 1 capsule (500 mg total) by mouth 4 (four) times daily. for 7 days     DHEA ORAL  Take 1 tablet by mouth every evening.     finasteride 5 mg tablet  Commonly known as: PROSCAR  Take 5 mg by mouth once daily.     fish oil-omega-3 fatty acids 300-1,000 mg capsule  Take by mouth once daily. 4 a day     folic acid 1 MG tablet  Commonly known as: FOLVITE  Take 1 mg by mouth once daily.     gabapentin 300 MG capsule  Commonly known as: NEURONTIN  Take 1 capsule (300 mg total) by mouth every evening. Take 1 capsule at night for three nights. Can increase to three times per day as needed.     glucosamine-chondroitin 500-400 mg tablet  Take 1 tablet by mouth 2 (two) times a day.     HYDROcodone-acetaminophen 5-325 mg per tablet  Commonly known as: NORCO  Take 1 tablet by mouth every 6 (six) hours as needed for Pain.     levothyroxine 50 MCG tablet  Commonly known as: SYNTHROID  TAKE 1 TABLET BY MOUTH DAILY     meloxicam 15 MG tablet  Commonly known as: MOBIC  Take 15 mg by mouth once daily.     NON FORMULARY MEDICATION  Take 1 tablet by mouth every evening. L- argintine     NON FORMULARY MEDICATION  Take 1 tablet by mouth once daily. Umary acid hyluonic     NOZIN NASAL  62 % Kit  Generic drug: ethyl alcohoL  Apply topically 2 (two) times a day.     ONE DAILY MULTIVITAMIN per tablet  Generic drug: multivitamin  Take 1 tablet by mouth once daily.     phenazopyridine 100 MG  tablet  Commonly known as: PYRIDIUM  Take 100 mg by mouth 3 (three) times daily as needed for Pain.     saw palmetto 500 MG capsule  Take 500 mg by mouth 2 (two) times daily.     simvastatin 20 MG tablet  Commonly known as: ZOCOR  TAKE 1 TABLET BY MOUTH EVERY DAY AT BEDTIME     tadalafiL 20 MG Tab  Commonly known as: CIALIS  Take 20 mg by mouth once daily.     tamsulosin 0.4 mg Cap  Commonly known as: FLOMAX  Take 0.4 mg by mouth 2 (two) times a day.     telmisartan 80 MG Tab  Commonly known as: MICARDIS  TAKE 1 TABLET BY MOUTH DAILY     testosterone cypionate 200 mg/mL injection  Commonly known as: DEPOTESTOTERONE CYPIONATE  Inject 1 mL (200 mg total) into the muscle every 14 (fourteen) days.     traMADoL 50 mg tablet  Commonly known as: ULTRAM  Take 1 tablet (50 mg total) by mouth every 8 (eight) hours as needed.     VEVYE 0.1 % Drop  Generic drug: cycloSPORINE  Apply 1 drop to eye 2 (two) times a day.     VITAMIN C 500 MG tablet  Generic drug: ascorbic acid (vitamin C)  Take 500 mg by mouth once daily.     vitamin D 1000 units Tab  Commonly known as: VITAMIN D3  Take 1,000 Units by mouth once daily.     zinc gluconate 50 mg tablet  Take 50 mg by mouth every evening.            ASK your doctor about these medications      ondansetron 8 MG tablet  Commonly known as: ZOFRAN  Take 8 mg by mouth every 8 (eight) hours as needed for Nausea.     oxyCODONE-acetaminophen 5-325 mg per tablet  Commonly known as: PERCOCET  Take 1 tablet by mouth every 6 (six) hours as needed for Pain.              Time spent on the discharge of patient: 15 minutes    Kieran Townsend MD  Urology  [facility] - [address] [facility]

## 2025-01-03 LAB — PSYCHE PATHOLOGY RESULT: NORMAL

## 2025-01-08 DIAGNOSIS — Z96.651 HISTORY OF TOTAL RIGHT KNEE REPLACEMENT: Primary | ICD-10-CM

## 2025-01-08 RX ORDER — MELOXICAM 15 MG/1
15 TABLET ORAL DAILY
Qty: 30 TABLET | Refills: 2 | Status: SHIPPED | OUTPATIENT
Start: 2025-01-08 | End: 2025-04-08

## 2025-01-10 DIAGNOSIS — Z96.651 HISTORY OF TOTAL RIGHT KNEE REPLACEMENT: Primary | ICD-10-CM

## 2025-01-10 RX ORDER — TRAMADOL HYDROCHLORIDE 50 MG/1
50 TABLET ORAL EVERY 8 HOURS PRN
Qty: 20 TABLET | Refills: 0 | Status: SHIPPED | OUTPATIENT
Start: 2025-01-10

## 2025-01-10 RX ORDER — KETOROLAC TROMETHAMINE 10 MG/1
10 TABLET, FILM COATED ORAL EVERY 8 HOURS
Qty: 15 TABLET | Refills: 0 | OUTPATIENT
Start: 2025-01-10 | End: 2025-01-15

## 2025-01-10 RX ORDER — KETOROLAC TROMETHAMINE 10 MG/1
10 TABLET, FILM COATED ORAL EVERY 6 HOURS
COMMUNITY

## 2025-01-13 ENCOUNTER — HOSPITAL ENCOUNTER (INPATIENT)
Facility: HOSPITAL | Age: 65
LOS: 4 days | Discharge: HOME HEALTH | DRG: 486 | End: 2025-01-17
Attending: ORTHOPAEDIC SURGERY | Admitting: ORTHOPAEDIC SURGERY
Payer: COMMERCIAL

## 2025-01-13 ENCOUNTER — HOSPITAL ENCOUNTER (OUTPATIENT)
Dept: RADIOLOGY | Facility: CLINIC | Age: 65
Discharge: HOME | End: 2025-01-13
Attending: ORTHOPAEDIC SURGERY
Payer: COMMERCIAL

## 2025-01-13 ENCOUNTER — OFFICE VISIT (OUTPATIENT)
Dept: ORTHOPEDICS | Facility: CLINIC | Age: 65
End: 2025-01-13
Payer: COMMERCIAL

## 2025-01-13 VITALS
WEIGHT: 238.75 LBS | BODY MASS INDEX: 29.07 KG/M2 | DIASTOLIC BLOOD PRESSURE: 88 MMHG | SYSTOLIC BLOOD PRESSURE: 136 MMHG | HEIGHT: 76 IN | HEART RATE: 100 BPM

## 2025-01-13 DIAGNOSIS — Z96.652 HISTORY OF TOTAL LEFT KNEE REPLACEMENT: Primary | ICD-10-CM

## 2025-01-13 DIAGNOSIS — Z96.652 S/P REVISION OF TOTAL KNEE, LEFT: Primary | ICD-10-CM

## 2025-01-13 DIAGNOSIS — Z96.659 HISTORY OF KNEE REPLACEMENT: ICD-10-CM

## 2025-01-13 DIAGNOSIS — Z96.652 HISTORY OF TOTAL LEFT KNEE REPLACEMENT: ICD-10-CM

## 2025-01-13 DIAGNOSIS — Z01.818 PRE-OPERATIVE CLEARANCE: ICD-10-CM

## 2025-01-13 DIAGNOSIS — M17.11 PRIMARY OSTEOARTHRITIS OF RIGHT KNEE: ICD-10-CM

## 2025-01-13 DIAGNOSIS — M00.9 PYOGENIC ARTHRITIS OF LEFT KNEE JOINT, DUE TO UNSPECIFIED ORGANISM: ICD-10-CM

## 2025-01-13 LAB
ALBUMIN SERPL-MCNC: 3 G/DL (ref 3.4–4.8)
ALBUMIN/GLOB SERPL: 0.7 RATIO (ref 1.1–2)
ALP SERPL-CCNC: 77 UNIT/L (ref 40–150)
ALT SERPL-CCNC: 54 UNIT/L (ref 0–55)
ANION GAP SERPL CALC-SCNC: 13 MEQ/L
AST SERPL-CCNC: 45 UNIT/L (ref 5–34)
BACTERIA #/AREA URNS AUTO: ABNORMAL /HPF
BASOPHILS # BLD AUTO: 0.02 X10(3)/MCL
BASOPHILS NFR BLD AUTO: 0.2 %
BILIRUB SERPL-MCNC: 0.8 MG/DL
BILIRUB UR QL STRIP.AUTO: NEGATIVE
BUN SERPL-MCNC: 22.5 MG/DL (ref 8.4–25.7)
CALCIUM SERPL-MCNC: 11.3 MG/DL (ref 8.8–10)
CHLORIDE SERPL-SCNC: 101 MMOL/L (ref 98–107)
CLARITY BODY FLUID (OLG): NORMAL
CLARITY UR: ABNORMAL
CO2 SERPL-SCNC: 23 MMOL/L (ref 23–31)
COLOR BODY FLUID (OLG): YELLOW
COLOR UR AUTO: ABNORMAL
CREAT SERPL-MCNC: 0.98 MG/DL (ref 0.72–1.25)
CREAT/UREA NIT SERPL: 23
CRP SERPL HS-MCNC: 250.14 MG/L
EOSINOPHIL # BLD AUTO: 0.06 X10(3)/MCL (ref 0–0.9)
EOSINOPHIL NFR BLD AUTO: 0.5 %
ERYTHROCYTE [DISTWIDTH] IN BLOOD BY AUTOMATED COUNT: 18.8 % (ref 11.5–17)
ERYTHROCYTE [SEDIMENTATION RATE] IN BLOOD: 102 MM/HR (ref 0–15)
GFR SERPLBLD CREATININE-BSD FMLA CKD-EPI: >60 ML/MIN/1.73/M2
GLOBULIN SER-MCNC: 4.5 GM/DL (ref 2.4–3.5)
GLUCOSE SERPL-MCNC: 103 MG/DL (ref 82–115)
GLUCOSE UR QL STRIP: NEGATIVE
GRAM STN SPEC: NORMAL
GRAM STN SPEC: NORMAL
HCT VFR BLD AUTO: 44.1 % (ref 42–52)
HGB BLD-MCNC: 14.1 G/DL (ref 14–18)
HGB UR QL STRIP: ABNORMAL
IMM GRANULOCYTES # BLD AUTO: 0.05 X10(3)/MCL (ref 0–0.04)
IMM GRANULOCYTES NFR BLD AUTO: 0.4 %
KETONES UR QL STRIP: 40
LACTATE SERPL-SCNC: 0.9 MMOL/L (ref 0.5–2.2)
LEUKOCYTE ESTERASE UR QL STRIP: ABNORMAL
LIPASE SERPL-CCNC: 23 U/L
LYMPHOCYTES # BLD AUTO: 1.44 X10(3)/MCL (ref 0.6–4.6)
LYMPHOCYTES NFR BLD AUTO: 12.8 %
MCH RBC QN AUTO: 25.8 PG (ref 27–31)
MCHC RBC AUTO-ENTMCNC: 32 G/DL (ref 33–36)
MCV RBC AUTO: 80.8 FL (ref 80–94)
MONOCYTE MAN % BF (OLG): 2 %
MONOCYTES # BLD AUTO: 0.74 X10(3)/MCL (ref 0.1–1.3)
MONOCYTES NFR BLD AUTO: 6.6 %
NEUTROPHILS # BLD AUTO: 8.97 X10(3)/MCL (ref 2.1–9.2)
NEUTROPHILS MAN % BF (OLG): 98 %
NEUTROPHILS NFR BLD AUTO: 79.5 %
NITRITE UR QL STRIP: POSITIVE
NRBC BLD AUTO-RTO: 0 %
PH UR STRIP: 7 [PH]
PLATELET # BLD AUTO: 293 X10(3)/MCL (ref 130–400)
PMV BLD AUTO: 8.7 FL (ref 7.4–10.4)
POTASSIUM SERPL-SCNC: 4.3 MMOL/L (ref 3.5–5.1)
PROT SERPL-MCNC: 7.5 GM/DL (ref 5.8–7.6)
PROT UR QL STRIP: 30
RBC # BLD AUTO: 5.46 X10(6)/MCL (ref 4.7–6.1)
RBC #/AREA URNS AUTO: ABNORMAL /HPF
SODIUM SERPL-SCNC: 137 MMOL/L (ref 136–145)
SP GR UR STRIP.AUTO: 1.02 (ref 1–1.03)
SQUAMOUS #/AREA URNS AUTO: ABNORMAL /HPF
UROBILINOGEN UR STRIP-ACNC: 0.2
WBC # BLD AUTO: 11.28 X10(3)/MCL (ref 4.5–11.5)
WBC # FLD AUTO: NORMAL /UL
WBC #/AREA URNS AUTO: >=100 /HPF

## 2025-01-13 PROCEDURE — 94799 UNLISTED PULMONARY SVC/PX: CPT

## 2025-01-13 PROCEDURE — 85025 COMPLETE CBC W/AUTO DIFF WBC: CPT | Performed by: NURSE PRACTITIONER

## 2025-01-13 PROCEDURE — 0S9D3ZZ DRAINAGE OF LEFT KNEE JOINT, PERCUTANEOUS APPROACH: ICD-10-PCS | Performed by: ORTHOPAEDIC SURGERY

## 2025-01-13 PROCEDURE — 3075F SYST BP GE 130 - 139MM HG: CPT | Mod: CPTII,,, | Performed by: ORTHOPAEDIC SURGERY

## 2025-01-13 PROCEDURE — 36415 COLL VENOUS BLD VENIPUNCTURE: CPT | Performed by: NURSE PRACTITIONER

## 2025-01-13 PROCEDURE — 99900031 HC PATIENT EDUCATION (STAT)

## 2025-01-13 PROCEDURE — 1101F PT FALLS ASSESS-DOCD LE1/YR: CPT | Mod: CPTII,,, | Performed by: ORTHOPAEDIC SURGERY

## 2025-01-13 PROCEDURE — 80053 COMPREHEN METABOLIC PANEL: CPT | Performed by: NURSE PRACTITIONER

## 2025-01-13 PROCEDURE — 87077 CULTURE AEROBIC IDENTIFY: CPT | Performed by: NURSE PRACTITIONER

## 2025-01-13 PROCEDURE — 36415 COLL VENOUS BLD VENIPUNCTURE: CPT | Performed by: INTERNAL MEDICINE

## 2025-01-13 PROCEDURE — 11000001 HC ACUTE MED/SURG PRIVATE ROOM

## 2025-01-13 PROCEDURE — 25000003 PHARM REV CODE 250: Performed by: NURSE PRACTITIONER

## 2025-01-13 PROCEDURE — 99214 OFFICE O/P EST MOD 30 MIN: CPT | Mod: 24,,, | Performed by: ORTHOPAEDIC SURGERY

## 2025-01-13 PROCEDURE — 1159F MED LIST DOCD IN RCRD: CPT | Mod: CPTII,,, | Performed by: ORTHOPAEDIC SURGERY

## 2025-01-13 PROCEDURE — 89051 BODY FLUID CELL COUNT: CPT | Performed by: ORTHOPAEDIC SURGERY

## 2025-01-13 PROCEDURE — 63600175 PHARM REV CODE 636 W HCPCS: Performed by: NURSE PRACTITIONER

## 2025-01-13 PROCEDURE — 85652 RBC SED RATE AUTOMATED: CPT | Performed by: INTERNAL MEDICINE

## 2025-01-13 PROCEDURE — 63600175 PHARM REV CODE 636 W HCPCS: Performed by: ORTHOPAEDIC SURGERY

## 2025-01-13 PROCEDURE — 1111F DSCHRG MED/CURRENT MED MERGE: CPT | Mod: CPTII,,, | Performed by: ORTHOPAEDIC SURGERY

## 2025-01-13 PROCEDURE — 87040 BLOOD CULTURE FOR BACTERIA: CPT | Performed by: INTERNAL MEDICINE

## 2025-01-13 PROCEDURE — 87205 SMEAR GRAM STAIN: CPT | Performed by: ORTHOPAEDIC SURGERY

## 2025-01-13 PROCEDURE — 83690 ASSAY OF LIPASE: CPT | Performed by: INTERNAL MEDICINE

## 2025-01-13 PROCEDURE — 93010 ELECTROCARDIOGRAM REPORT: CPT | Mod: ,,, | Performed by: INTERNAL MEDICINE

## 2025-01-13 PROCEDURE — 93005 ELECTROCARDIOGRAM TRACING: CPT

## 2025-01-13 PROCEDURE — 25000003 PHARM REV CODE 250: Performed by: ORTHOPAEDIC SURGERY

## 2025-01-13 PROCEDURE — 81001 URINALYSIS AUTO W/SCOPE: CPT | Performed by: NURSE PRACTITIONER

## 2025-01-13 PROCEDURE — 73564 X-RAY EXAM KNEE 4 OR MORE: CPT | Mod: LT,,, | Performed by: ORTHOPAEDIC SURGERY

## 2025-01-13 PROCEDURE — 83605 ASSAY OF LACTIC ACID: CPT | Performed by: NURSE PRACTITIONER

## 2025-01-13 PROCEDURE — 3288F FALL RISK ASSESSMENT DOCD: CPT | Mod: CPTII,,, | Performed by: ORTHOPAEDIC SURGERY

## 2025-01-13 PROCEDURE — 63600175 PHARM REV CODE 636 W HCPCS: Performed by: INTERNAL MEDICINE

## 2025-01-13 PROCEDURE — 3079F DIAST BP 80-89 MM HG: CPT | Mod: CPTII,,, | Performed by: ORTHOPAEDIC SURGERY

## 2025-01-13 PROCEDURE — 86141 C-REACTIVE PROTEIN HS: CPT | Performed by: INTERNAL MEDICINE

## 2025-01-13 PROCEDURE — 3008F BODY MASS INDEX DOCD: CPT | Mod: CPTII,,, | Performed by: ORTHOPAEDIC SURGERY

## 2025-01-13 RX ORDER — ACETAMINOPHEN 325 MG/1
325 TABLET ORAL EVERY 4 HOURS PRN
Status: DISCONTINUED | OUTPATIENT
Start: 2025-01-13 | End: 2025-01-17 | Stop reason: HOSPADM

## 2025-01-13 RX ORDER — OXYCODONE AND ACETAMINOPHEN 7.5; 325 MG/1; MG/1
1 TABLET ORAL EVERY 4 HOURS PRN
Status: DISCONTINUED | OUTPATIENT
Start: 2025-01-13 | End: 2025-01-17 | Stop reason: HOSPADM

## 2025-01-13 RX ORDER — VALSARTAN 160 MG/1
320 TABLET ORAL DAILY
Status: DISCONTINUED | OUTPATIENT
Start: 2025-01-13 | End: 2025-01-13

## 2025-01-13 RX ORDER — LEVOTHYROXINE SODIUM 50 UG/1
50 TABLET ORAL DAILY
Status: DISCONTINUED | OUTPATIENT
Start: 2025-01-14 | End: 2025-01-17 | Stop reason: HOSPADM

## 2025-01-13 RX ORDER — AMOXICILLIN 250 MG
2 CAPSULE ORAL 2 TIMES DAILY
Status: DISCONTINUED | OUTPATIENT
Start: 2025-01-13 | End: 2025-01-16

## 2025-01-13 RX ORDER — GABAPENTIN 300 MG/1
300 CAPSULE ORAL NIGHTLY
Status: DISCONTINUED | OUTPATIENT
Start: 2025-01-13 | End: 2025-01-17 | Stop reason: HOSPADM

## 2025-01-13 RX ORDER — TRAZODONE HYDROCHLORIDE 50 MG/1
50 TABLET ORAL NIGHTLY PRN
Status: DISCONTINUED | OUTPATIENT
Start: 2025-01-13 | End: 2025-01-17 | Stop reason: HOSPADM

## 2025-01-13 RX ORDER — CEFEPIME HYDROCHLORIDE 2 G/1
2 INJECTION, POWDER, FOR SOLUTION INTRAVENOUS
Status: DISCONTINUED | OUTPATIENT
Start: 2025-01-13 | End: 2025-01-17 | Stop reason: HOSPADM

## 2025-01-13 RX ORDER — VALSARTAN 160 MG/1
320 TABLET ORAL NIGHTLY
Status: DISCONTINUED | OUTPATIENT
Start: 2025-01-13 | End: 2025-01-17 | Stop reason: HOSPADM

## 2025-01-13 RX ORDER — SODIUM CHLORIDE 9 MG/ML
INJECTION, SOLUTION INTRAVENOUS CONTINUOUS
Status: DISCONTINUED | OUTPATIENT
Start: 2025-01-14 | End: 2025-01-16

## 2025-01-13 RX ORDER — LABETALOL HYDROCHLORIDE 5 MG/ML
10 INJECTION, SOLUTION INTRAVENOUS EVERY 4 HOURS PRN
Status: DISCONTINUED | OUTPATIENT
Start: 2025-01-13 | End: 2025-01-17 | Stop reason: HOSPADM

## 2025-01-13 RX ORDER — MORPHINE SULFATE 4 MG/ML
4 INJECTION, SOLUTION INTRAMUSCULAR; INTRAVENOUS
Status: DISCONTINUED | OUTPATIENT
Start: 2025-01-13 | End: 2025-01-17 | Stop reason: HOSPADM

## 2025-01-13 RX ORDER — HYDRALAZINE HYDROCHLORIDE 20 MG/ML
10 INJECTION INTRAMUSCULAR; INTRAVENOUS
Status: DISCONTINUED | OUTPATIENT
Start: 2025-01-13 | End: 2025-01-17 | Stop reason: HOSPADM

## 2025-01-13 RX ORDER — OXYCODONE AND ACETAMINOPHEN 5; 325 MG/1; MG/1
1 TABLET ORAL EVERY 4 HOURS PRN
Status: DISCONTINUED | OUTPATIENT
Start: 2025-01-13 | End: 2025-01-17 | Stop reason: HOSPADM

## 2025-01-13 RX ORDER — POLYETHYLENE GLYCOL 3350 17 G/17G
17 POWDER, FOR SOLUTION ORAL DAILY
Status: DISCONTINUED | OUTPATIENT
Start: 2025-01-14 | End: 2025-01-16

## 2025-01-13 RX ORDER — ONDANSETRON HYDROCHLORIDE 4 MG/5ML
4 SOLUTION ORAL EVERY 6 HOURS PRN
Status: DISCONTINUED | OUTPATIENT
Start: 2025-01-13 | End: 2025-01-17 | Stop reason: HOSPADM

## 2025-01-13 RX ORDER — METHOCARBAMOL 750 MG/1
750 TABLET, FILM COATED ORAL EVERY 8 HOURS PRN
Status: DISCONTINUED | OUTPATIENT
Start: 2025-01-13 | End: 2025-01-17 | Stop reason: HOSPADM

## 2025-01-13 RX ORDER — PANTOPRAZOLE SODIUM 40 MG/10ML
40 INJECTION, POWDER, LYOPHILIZED, FOR SOLUTION INTRAVENOUS 2 TIMES DAILY
Status: DISCONTINUED | OUTPATIENT
Start: 2025-01-13 | End: 2025-01-17 | Stop reason: HOSPADM

## 2025-01-13 RX ADMIN — VALSARTAN 320 MG: 160 TABLET, FILM COATED ORAL at 08:01

## 2025-01-13 RX ADMIN — GABAPENTIN 300 MG: 300 CAPSULE ORAL at 08:01

## 2025-01-13 RX ADMIN — PIPERACILLIN SODIUM AND TAZOBACTAM SODIUM 4.5 G: 4; .5 INJECTION, POWDER, LYOPHILIZED, FOR SOLUTION INTRAVENOUS at 05:01

## 2025-01-13 RX ADMIN — SODIUM CHLORIDE: 9 INJECTION, SOLUTION INTRAVENOUS at 05:01

## 2025-01-13 RX ADMIN — VANCOMYCIN HYDROCHLORIDE 1000 MG: 1 INJECTION, POWDER, LYOPHILIZED, FOR SOLUTION INTRAVENOUS at 10:01

## 2025-01-13 RX ADMIN — OXYCODONE HYDROCHLORIDE AND ACETAMINOPHEN 1 TABLET: 5; 325 TABLET ORAL at 05:01

## 2025-01-13 RX ADMIN — SENNOSIDES AND DOCUSATE SODIUM 2 TABLET: 50; 8.6 TABLET ORAL at 08:01

## 2025-01-13 RX ADMIN — CEFEPIME 2 G: 2 INJECTION, POWDER, FOR SOLUTION INTRAVENOUS at 08:01

## 2025-01-13 RX ADMIN — VANCOMYCIN HYDROCHLORIDE 1000 MG: 1 INJECTION, POWDER, LYOPHILIZED, FOR SOLUTION INTRAVENOUS at 08:01

## 2025-01-13 RX ADMIN — PANTOPRAZOLE SODIUM 40 MG: 40 INJECTION, POWDER, LYOPHILIZED, FOR SOLUTION INTRAVENOUS at 07:01

## 2025-01-13 NOTE — H&P
Chief Complaint:   No chief complaint on file.      Consulting Physician: Pablito Abad Jr., MD    History of present illness:    06/11/2024: Left total knee arthroplasty      He returns today.  His left knee was doing great until 1 week ago he awoken with pain and swelling in the knee.  He has noticed associated fevers.  It is worse with activity.  It is better at rest.  He has tried tramadol without relief.  He is using a wheelchair to get around.  He is status post prostatectomy 2 weeks ago.    Past Medical History:   Diagnosis Date    Arthritis     Bladder stone     BPH (benign prostatic hyperplasia)     Does use hearing aid     Dry eye     ED (erectile dysfunction)     Enlarged prostate with urinary obstruction     Hearing loss     wears hearing aids    HLD (hyperlipidemia)     HTN (hypertension)     Hypothyroidism, unspecified     Nocturia     Obesity     Personal history of colonic polyps 06/20/2018    Colonoscopy Report    Unspecified sensorineural hearing loss     Urinary catheter in place     Uses walker        Past Surgical History:   Procedure Laterality Date    CATARACT EXTRACTION W/  INTRAOCULAR LENS IMPLANT Right     COLONOSCOPY  06/20/2018    COLONOSCOPY W/ POLYPECTOMY  06/27/2023    KNEE ARTHROSCOPY W/ MENISCAL REPAIR Right     MOUTH SURGERY      salivary gland    ROBOT-ASSISTED LAPAROSCOPIC SIMPLE PROSTATECTOMY USING DA LOUIE XI N/A 12/31/2024    Procedure: XI ROBOTIC PROSTATECTOMY, SIMPLE;  Surgeon: Kieran Townsend MD;  Location: [facility];  Service: Urology;  Laterality: N/A;  PLUS REMOVE BLADDER STONE    ROBOTIC ARTHROPLASTY, KNEE Left 06/11/2024    Procedure: SDD- ROBOTIC ARTHROPLASTY, KNEE, TOTAL;  Surgeon: Pablito Abad Jr., MD;  Location: [facility] OR;  Service: Orthopedics;  Laterality: Left;    ROBOTIC ARTHROPLASTY, KNEE Right 12/10/2024    Procedure: SDD - ROBOTIC ARTHROPLASTY, KNEE, TOTAL;  Surgeon: Pablito Abad Jr., MD;  Location: [facility] OR;  Service: Orthopedics;  Laterality:  Right;       Current Facility-Administered Medications   Medication    [START ON 1/14/2025] 0.9% NaCl infusion    gabapentin capsule 300 mg    [START ON 1/14/2025] levothyroxine tablet 50 mcg    methocarbamoL tablet 750 mg    morphine injection 4 mg    ondansetron 4 mg/5 mL solution 4 mg    oxyCODONE-acetaminophen 5-325 mg per tablet 1 tablet    oxyCODONE-acetaminophen 7.5-325 mg per tablet 1 tablet    piperacillin-tazobactam (ZOSYN) 4.5 g in D5W 100 mL IVPB (MB+)    [START ON 1/14/2025] polyethylene glycol packet 17 g    senna-docusate 8.6-50 mg per tablet 2 tablet    valsartan tablet 320 mg    vancomycin (VANCOCIN) 1,000 mg in 0.9% NaCl 250 mL IVPB (admixture device)    Followed by    vancomycin (VANCOCIN) 1,000 mg in 0.9% NaCl 250 mL IVPB (admixture device)    vancomycin - pharmacy to dose       Review of patient's allergies indicates:  No Known Allergies    Family History   Problem Relation Name Age of Onset    Hypertension Mother      Hypertension Father Jaren Cordero     Brain aneurysm Father Jaren Cordero     Arthritis Father Jaren Cordero        Social History     Socioeconomic History    Marital status:    Tobacco Use    Smoking status: Some Days     Types: Cigars     Passive exposure: Yes    Smokeless tobacco: Never    Tobacco comments:     1-2 cigars a month.   Substance and Sexual Activity    Alcohol use: Yes     Alcohol/week: 10.0 standard drinks of alcohol     Types: 10 Cans of beer per week     Comment: 1-2 a day and sometimes none    Drug use: Never    Sexual activity: Yes     Partners: Female     Birth control/protection: Post-menopausal     Social Drivers of Health     Financial Resource Strain: Low Risk  (9/11/2024)    Overall Financial Resource Strain (CARDIA)     Difficulty of Paying Living Expenses: Not hard at all   Food Insecurity: No Food Insecurity (9/11/2024)    Hunger Vital Sign     Worried About Running Out of Food in the Last Year: Never true     Ran Out of Food in  the Last Year: Never true   Transportation Needs: No Transportation Needs (6/24/2024)    TRANSPORTATION NEEDS     Transportation : No   Physical Activity: Sufficiently Active (9/11/2024)    Exercise Vital Sign     Days of Exercise per Week: 4 days     Minutes of Exercise per Session: 50 min   Recent Concern: Physical Activity - Insufficiently Active (6/24/2024)    Exercise Vital Sign     Days of Exercise per Week: 2 days     Minutes of Exercise per Session: 10 min   Stress: No Stress Concern Present (9/11/2024)    [place] citizen [employer] - Occupational Stress Questionnaire     Feeling of Stress : Not at all   Housing Stability: Low Risk  (9/11/2024)    Housing Stability Vital Sign     Unable to Pay for Housing in the Last Year: No     Homeless in the Last Year: No       Review of Systems:    Constitution:   Denies chills, fever, and sweats.  HENT:   Denies headaches or blurry vision.  Cardiovascular:  Denies chest pain or irregular heart beat.  Respiratory:   Denies cough or shortness of breath.  Gastrointestinal:  Denies abdominal pain, nausea, or vomiting.  Musculoskeletal:   Denies muscle cramps.  Neurological:   Denies dizziness or focal weakness.  Psychiatric/Behavior: Normal mental status.  Hematology/Lymph:  Denies bleeding problem or easy bruising/bleeding.  Skin:    Denies rash or suspicious lesions.    Examination:    Vital Signs:    Vitals:    01/13/25 1544 01/13/25 1722   BP: (!) 145/97    Pulse: 110    Resp: 18    Temp: (!) 100.9 °F (38.3 °C)    TempSrc: Oral    SpO2: 98%    Weight:  108.3 kg (238 lb 12.1 oz)       Body mass index is 29.06 kg/m².    Constitution:   Well-developed, well nourished patient in no acute distress.  Neurological:   Alert and oriented x 3 and cooperative to examination.     Psychiatric/Behavior: Normal mental status.  Respiratory:   No shortness of breath.  Cards:   Pulses palpable, brisk cap refill  Eyes:    Extraoccular muscles intact  Skin:    No scars, rash  or suspicious lesions.    MSK:   His incisions healed.  He has a large effusion.  Range of motion limited secondary to pain.  Distally he is neurovascularly intact    Imaging: X-rays ordered and images interpreted today personally by me of four views of the left knee show a large effusion     Assessment: History of knee replacement  -     Case Request Operating Room: IRRIGATION AND DEBRIDEMENT, LOWER EXTREMITY, REVISION, ARTHROPLASTY, KNEE - poly; Standing    History of total left knee replacement  -     Cell Count, Body Fluid; Standing  -     Cell Count, Body Fluid  -     Gram Stain; Standing  -     Gram Stain  -     Differential, Body Fluid; Standing    Pre-operative clearance  -     EKG 12-lead; Standing    Other orders  -     Admit to Inpatient; Standing  -     Cancel: Vital signs; Standing  -     Insert peripheral IV; Standing  -     Clip and Prep Other (please specifiy) (Operative site); Standing  -     Cleanse with Chlorhexidine (CHG); Standing  -     Apply Nozin; Standing  -     Diet NPO; Standing  -     Place TATIANNA hose; Standing  -     Place sequential compression device; Standing  -     ceFAZolin 2 g  -     acetaminophen tablet 1,000 mg  -     ketorolac tablet 10 mg  -     gabapentin capsule 300 mg  -     tranexamic acid (LYSTEDA) tablet 1,950 mg  -     POCT glucose; Standing  -     Inpatient consult to Anesthesiology; Standing  -     0.9% NaCl infusion  -     oxyCODONE-acetaminophen 5-325 mg per tablet 1 tablet  -     oxyCODONE-acetaminophen 7.5-325 mg per tablet 1 tablet  -     methocarbamoL tablet 750 mg  -     ondansetron 4 mg/5 mL solution 4 mg  -     polyethylene glycol packet 17 g  -     senna-docusate 8.6-50 mg per tablet 2 tablet  -     Incentive spirometry; Standing  -     Admit to Inpatient; Standing  -     X-Ray Chest 1 View; Standing  -     morphine injection 4 mg  -     Urinalysis, Reflex to Urine Culture; Standing  -     Comprehensive Metabolic Panel; Standing  -     gabapentin capsule 300  mg  -     levothyroxine tablet 50 mcg  -     valsartan tablet 320 mg  -     Pharmacy to dose Vancomycin consult; Standing  -     piperacillin-tazobactam (ZOSYN) 4.5 g in D5W 100 mL IVPB (MB+)  -     CBC Auto Differential; Standing  -     Urinalysis, Microscopic; Standing  -     Diet Adult Regular Standard Tray; Standing  -     Urine culture; Standing  -     Lactic Acid, Plasma; Standing  -     Lactic Acid, Plasma; Standing  -     IP Order set\panel used to initiate sepsis orders; Standing  -     vancomycin (VANCOCIN) 1,000 mg in 0.9% NaCl 250 mL IVPB (admixture device)  -     vancomycin (VANCOCIN) 1,000 mg in 0.9% NaCl 250 mL IVPB (admixture device)  -     vancomycin - pharmacy to dose        Plan:  We aspirated the left knee with with 130 cc of purulent aspirate.  This will be sent for the lab for cell count and culture and Gram stain.  We will admit him with plans for irrigation debridement tomorrow with poly swap.

## 2025-01-13 NOTE — PROGRESS NOTES
"Chief Complaint:   Chief Complaint   Patient presents with    Left Knee - Pain, Swelling     7 months sp Left TKA sx 6/11/24,  Left knee pain, swelling and fever, patient states "each step is the worse pain imaginable," takes tramadol with no relief and percocet causes constipation but does provide relief, present today in a wheelchair, pain started a week ago.       Consulting Physician: No ref. provider found    History of present illness:    06/11/2024: Left total knee arthroplasty      He returns today.  His left knee was doing great until 1 week ago he awoken with pain and swelling in the knee.  He has noticed associated fevers.  It is worse with activity.  It is better at rest.  He has tried tramadol without relief.  He is using a wheelchair to get around.  He is status post prostatectomy 2 weeks ago.    Past Medical History:   Diagnosis Date    Arthritis     Bladder stone     BPH (benign prostatic hyperplasia)     Does use hearing aid     Dry eye     ED (erectile dysfunction)     Enlarged prostate with urinary obstruction     Hearing loss     wears hearing aids    HLD (hyperlipidemia)     HTN (hypertension)     Hypothyroidism, unspecified     Nocturia     Obesity     Personal history of colonic polyps 06/20/2018    Colonoscopy Report    Unspecified sensorineural hearing loss     Urinary catheter in place     Uses walker        Past Surgical History:   Procedure Laterality Date    CATARACT EXTRACTION W/  INTRAOCULAR LENS IMPLANT Right     COLONOSCOPY  06/20/2018    COLONOSCOPY W/ POLYPECTOMY  06/27/2023    KNEE ARTHROSCOPY W/ MENISCAL REPAIR Right     MOUTH SURGERY      salivary gland    ROBOT-ASSISTED LAPAROSCOPIC SIMPLE PROSTATECTOMY USING DA LOUIE XI N/A 12/31/2024    Procedure: XI ROBOTIC PROSTATECTOMY, SIMPLE;  Surgeon: Kieran Townsend MD;  Location: [facility];  Service: Urology;  Laterality: N/A;  PLUS REMOVE BLADDER STONE    ROBOTIC ARTHROPLASTY, KNEE Left 06/11/2024    Procedure: SDD- ROBOTIC " ARTHROPLASTY, KNEE, TOTAL;  Surgeon: Pablito Abad Jr., MD;  Location: [facility] OR;  Service: Orthopedics;  Laterality: Left;    ROBOTIC ARTHROPLASTY, KNEE Right 12/10/2024    Procedure: SDD - ROBOTIC ARTHROPLASTY, KNEE, TOTAL;  Surgeon: Pablito Abad Jr., MD;  Location: [facility] OR;  Service: Orthopedics;  Laterality: Right;       Current Outpatient Medications   Medication Sig    ascorbic acid, vitamin C, (VITAMIN C) 500 MG tablet Take 500 mg by mouth once daily.    aspirin (ECOTRIN) 81 MG EC tablet Take 1 tablet (81 mg total) by mouth once daily.    b complex vitamins tablet Take 1 tablet by mouth once daily.    cycloSPORINE (VEVYE) 0.1 % Drop Apply 1 drop to eye 2 (two) times a day.    ethyl alcohoL (NOZIN NASAL ) 62 % Kit Apply topically 2 (two) times a day.    folic acid (FOLVITE) 1 MG tablet Take 1 mg by mouth once daily.    gabapentin (NEURONTIN) 300 MG capsule Take 1 capsule (300 mg total) by mouth every evening. Take 1 capsule at night for three nights. Can increase to three times per day as needed.    glucosamine-chondroitin 500-400 mg tablet Take 1 tablet by mouth 2 (two) times a day.    HYDROcodone-acetaminophen (NORCO) 5-325 mg per tablet Take 1 tablet by mouth every 6 (six) hours as needed for Pain.    ketorolac (TORADOL) 10 mg tablet Take 10 mg by mouth every 6 (six) hours.    levothyroxine (SYNTHROID) 50 MCG tablet TAKE 1 TABLET BY MOUTH DAILY    meloxicam (MOBIC) 15 MG tablet Take 1 tablet (15 mg total) by mouth once daily.    multivitamin (ONE DAILY MULTIVITAMIN) per tablet Take 1 tablet by mouth once daily.    NON FORMULARY MEDICATION Take 1 tablet by mouth every evening. L- argintine    NON FORMULARY MEDICATION Take 1 tablet by mouth once daily. Umary acid hyluonic    omega-3 fatty acids/fish oil (FISH OIL-OMEGA-3 FATTY ACIDS) 300-1,000 mg capsule Take by mouth once daily. 4 a day    phenazopyridine (PYRIDIUM) 100 MG tablet Take 100 mg by mouth 3 (three) times daily as needed for Pain.     prasterone, DHEA, (DHEA ORAL) Take 1 tablet by mouth every evening.    saw palmetto 500 MG capsule Take 500 mg by mouth 2 (two) times daily.    simvastatin (ZOCOR) 20 MG tablet TAKE 1 TABLET BY MOUTH EVERY DAY AT BEDTIME    tadalafiL (CIALIS) 20 MG Tab Take 20 mg by mouth once daily.    telmisartan (MICARDIS) 80 MG Tab TAKE 1 TABLET BY MOUTH DAILY    testosterone cypionate (DEPOTESTOTERONE CYPIONATE) 200 mg/mL injection Inject 1 mL (200 mg total) into the muscle every 14 (fourteen) days.    traMADoL (ULTRAM) 50 mg tablet Take 1 tablet (50 mg total) by mouth every 8 (eight) hours as needed for Pain.    vitamin D (VITAMIN D3) 1000 units Tab Take 1,000 Units by mouth once daily.    zinc gluconate 50 mg tablet Take 50 mg by mouth every evening.    finasteride (PROSCAR) 5 mg tablet Take 5 mg by mouth once daily. (Patient not taking: Reported on 1/13/2025)    ondansetron (ZOFRAN) 8 MG tablet Take 8 mg by mouth every 8 (eight) hours as needed for Nausea. (Patient not taking: Reported on 12/30/2024)    oxyCODONE-acetaminophen (PERCOCET) 5-325 mg per tablet Take 1 tablet by mouth every 6 (six) hours as needed for Pain. (Patient not taking: Reported on 12/30/2024)    tamsulosin (FLOMAX) 0.4 mg Cap Take 0.4 mg by mouth 2 (two) times a day. (Patient not taking: Reported on 1/13/2025)     No current facility-administered medications for this visit.       Review of patient's allergies indicates:  No Known Allergies    Family History   Problem Relation Name Age of Onset    Hypertension Mother      Hypertension Father Jaren Moseleyrodrigue     Brain aneurysm Father Jaren Cordero     Arthritis Father Jaren Cordero        Social History     Socioeconomic History    Marital status:    Tobacco Use    Smoking status: Some Days     Types: Cigars     Passive exposure: Yes    Smokeless tobacco: Never    Tobacco comments:     1-2 cigars a month.   Substance and Sexual Activity    Alcohol use: Yes     Alcohol/week: 10.0 standard  drinks of alcohol     Types: 10 Cans of beer per week     Comment: 1-2 a day and sometimes none    Drug use: Never    Sexual activity: Yes     Partners: Female     Birth control/protection: Post-menopausal     Social Drivers of Health     Financial Resource Strain: Low Risk  (9/11/2024)    Overall Financial Resource Strain (CARDIA)     Difficulty of Paying Living Expenses: Not hard at all   Food Insecurity: No Food Insecurity (9/11/2024)    Hunger Vital Sign     Worried About Running Out of Food in the Last Year: Never true     Ran Out of Food in the Last Year: Never true   Transportation Needs: No Transportation Needs (6/24/2024)    TRANSPORTATION NEEDS     Transportation : No   Physical Activity: Sufficiently Active (9/11/2024)    Exercise Vital Sign     Days of Exercise per Week: 4 days     Minutes of Exercise per Session: 50 min   Recent Concern: Physical Activity - Insufficiently Active (6/24/2024)    Exercise Vital Sign     Days of Exercise per Week: 2 days     Minutes of Exercise per Session: 10 min   Stress: No Stress Concern Present (9/11/2024)    [employer] - Occupational Stress Questionnaire     Feeling of Stress : Not at all   Housing Stability: Low Risk  (9/11/2024)    Housing Stability Vital Sign     Unable to Pay for Housing in the Last Year: No     Homeless in the Last Year: No       Review of Systems:    Constitution:   Denies chills, fever, and sweats.  HENT:   Denies headaches or blurry vision.  Cardiovascular:  Denies chest pain or irregular heart beat.  Respiratory:   Denies cough or shortness of breath.  Gastrointestinal:  Denies abdominal pain, nausea, or vomiting.  Musculoskeletal:   Denies muscle cramps.  Neurological:   Denies dizziness or focal weakness.  Psychiatric/Behavior: Normal mental status.  Hematology/Lymph:  Denies bleeding problem or easy bruising/bleeding.  Skin:    Denies rash or suspicious lesions.    Examination:    Vital Signs:    Vitals:     "01/13/25 1316   BP: 136/88   Pulse: 100   Weight: 108.3 kg (238 lb 12.1 oz)   Height: 6' 4" (1.93 m)       Body mass index is 29.06 kg/m².    Constitution:   Well-developed, well nourished patient in no acute distress.  Neurological:   Alert and oriented x 3 and cooperative to examination.     Psychiatric/Behavior: Normal mental status.  Respiratory:   No shortness of breath.  Cards:   Pulses palpable, brisk cap refill  Eyes:    Extraoccular muscles intact  Skin:    No scars, rash or suspicious lesions.    MSK:   His incisions healed.  He has a large effusion.  Range of motion limited secondary to pain.  Distally he is neurovascularly intact    Imaging: X-rays ordered and images interpreted today personally by me of four views of the left knee show a large effusion     Assessment: History of total left knee replacement  -     X-Ray Knee Complete 4 or More Views Left; Future; Expected date: 01/13/2025  -     Cell Count, Body Fluid; Future; Expected date: 01/13/2025  -     Body Fluid Culture  -     Anaerobic Culture  -     Gram Stain; Future; Expected date: 01/13/2025    Pyogenic arthritis of left knee joint, due to unspecified organism        Plan:  We aspirated the left knee with with 130 cc of purulent aspirate.  This will be sent for the lab for cell count and culture and Gram stain.  We will admit him with plans for irrigation debridement tomorrow with poly swap.      "

## 2025-01-14 ENCOUNTER — ANESTHESIA (OUTPATIENT)
Dept: SURGERY | Facility: HOSPITAL | Age: 65
DRG: 486 | End: 2025-01-14
Payer: COMMERCIAL

## 2025-01-14 ENCOUNTER — ANESTHESIA EVENT (OUTPATIENT)
Dept: SURGERY | Facility: HOSPITAL | Age: 65
DRG: 486 | End: 2025-01-14
Payer: COMMERCIAL

## 2025-01-14 LAB
ALBUMIN SERPL-MCNC: 2.6 G/DL (ref 3.4–4.8)
ALBUMIN/GLOB SERPL: 0.7 RATIO (ref 1.1–2)
ALP SERPL-CCNC: 69 UNIT/L (ref 40–150)
ALT SERPL-CCNC: 56 UNIT/L (ref 0–55)
ANION GAP SERPL CALC-SCNC: 10 MEQ/L
AST SERPL-CCNC: 43 UNIT/L (ref 5–34)
BASOPHILS # BLD AUTO: 0.04 X10(3)/MCL
BASOPHILS NFR BLD AUTO: 0.4 %
BILIRUB SERPL-MCNC: 0.7 MG/DL
BUN SERPL-MCNC: 20.8 MG/DL (ref 8.4–25.7)
CALCIUM SERPL-MCNC: 10.2 MG/DL (ref 8.8–10)
CHLORIDE SERPL-SCNC: 105 MMOL/L (ref 98–107)
CO2 SERPL-SCNC: 23 MMOL/L (ref 23–31)
CREAT SERPL-MCNC: 0.9 MG/DL (ref 0.72–1.25)
CREAT/UREA NIT SERPL: 23
EOSINOPHIL # BLD AUTO: 0.2 X10(3)/MCL (ref 0–0.9)
EOSINOPHIL NFR BLD AUTO: 2 %
ERYTHROCYTE [DISTWIDTH] IN BLOOD BY AUTOMATED COUNT: 18.3 % (ref 11.5–17)
GFR SERPLBLD CREATININE-BSD FMLA CKD-EPI: >60 ML/MIN/1.73/M2
GLOBULIN SER-MCNC: 3.8 GM/DL (ref 2.4–3.5)
GLUCOSE SERPL-MCNC: 90 MG/DL (ref 82–115)
GRAM STN SPEC: NORMAL
GRAM STN SPEC: NORMAL
HCT VFR BLD AUTO: 40.3 % (ref 42–52)
HGB BLD-MCNC: 12.9 G/DL (ref 14–18)
IMM GRANULOCYTES # BLD AUTO: 0.03 X10(3)/MCL (ref 0–0.04)
IMM GRANULOCYTES NFR BLD AUTO: 0.3 %
LYMPHOCYTES # BLD AUTO: 1.5 X10(3)/MCL (ref 0.6–4.6)
LYMPHOCYTES NFR BLD AUTO: 15 %
MCH RBC QN AUTO: 26 PG (ref 27–31)
MCHC RBC AUTO-ENTMCNC: 32 G/DL (ref 33–36)
MCV RBC AUTO: 81.1 FL (ref 80–94)
MONOCYTES # BLD AUTO: 0.68 X10(3)/MCL (ref 0.1–1.3)
MONOCYTES NFR BLD AUTO: 6.8 %
NEUTROPHILS # BLD AUTO: 7.54 X10(3)/MCL (ref 2.1–9.2)
NEUTROPHILS NFR BLD AUTO: 75.5 %
NRBC BLD AUTO-RTO: 0 %
OHS QRS DURATION: 128 MS
OHS QTC CALCULATION: 450 MS
PLATELET # BLD AUTO: 264 X10(3)/MCL (ref 130–400)
PMV BLD AUTO: 8.5 FL (ref 7.4–10.4)
POTASSIUM SERPL-SCNC: 4.2 MMOL/L (ref 3.5–5.1)
PROT SERPL-MCNC: 6.4 GM/DL (ref 5.8–7.6)
RBC # BLD AUTO: 4.97 X10(6)/MCL (ref 4.7–6.1)
SODIUM SERPL-SCNC: 138 MMOL/L (ref 136–145)
WBC # BLD AUTO: 9.99 X10(3)/MCL (ref 4.5–11.5)

## 2025-01-14 PROCEDURE — 87205 SMEAR GRAM STAIN: CPT | Performed by: ORTHOPAEDIC SURGERY

## 2025-01-14 PROCEDURE — 27486 REVISE/REPLACE KNEE JOINT: CPT | Mod: AS,52,LT, | Performed by: NURSE PRACTITIONER

## 2025-01-14 PROCEDURE — 37000008 HC ANESTHESIA 1ST 15 MINUTES: Performed by: ORTHOPAEDIC SURGERY

## 2025-01-14 PROCEDURE — 36569 INSJ PICC 5 YR+ W/O IMAGING: CPT

## 2025-01-14 PROCEDURE — 85025 COMPLETE CBC W/AUTO DIFF WBC: CPT | Performed by: INTERNAL MEDICINE

## 2025-01-14 PROCEDURE — C1751 CATH, INF, PER/CENT/MIDLINE: HCPCS

## 2025-01-14 PROCEDURE — 36000710: Performed by: ORTHOPAEDIC SURGERY

## 2025-01-14 PROCEDURE — D9220A PRA ANESTHESIA: Mod: ANES,,, | Performed by: STUDENT IN AN ORGANIZED HEALTH CARE EDUCATION/TRAINING PROGRAM

## 2025-01-14 PROCEDURE — D9220A PRA ANESTHESIA: Mod: CRNA,,,

## 2025-01-14 PROCEDURE — 87077 CULTURE AEROBIC IDENTIFY: CPT | Performed by: ORTHOPAEDIC SURGERY

## 2025-01-14 PROCEDURE — A4247 BETADINE/IODINE SWABS/WIPES: HCPCS | Performed by: ORTHOPAEDIC SURGERY

## 2025-01-14 PROCEDURE — 63600175 PHARM REV CODE 636 W HCPCS: Performed by: INTERNAL MEDICINE

## 2025-01-14 PROCEDURE — 87075 CULTR BACTERIA EXCEPT BLOOD: CPT | Performed by: ORTHOPAEDIC SURGERY

## 2025-01-14 PROCEDURE — 63600175 PHARM REV CODE 636 W HCPCS: Performed by: ORTHOPAEDIC SURGERY

## 2025-01-14 PROCEDURE — 63600175 PHARM REV CODE 636 W HCPCS: Mod: JZ,TB | Performed by: STUDENT IN AN ORGANIZED HEALTH CARE EDUCATION/TRAINING PROGRAM

## 2025-01-14 PROCEDURE — 3E0U029 INTRODUCTION OF OTHER ANTI-INFECTIVE INTO JOINTS, OPEN APPROACH: ICD-10-PCS | Performed by: ORTHOPAEDIC SURGERY

## 2025-01-14 PROCEDURE — 25000003 PHARM REV CODE 250: Performed by: ORTHOPAEDIC SURGERY

## 2025-01-14 PROCEDURE — 63600175 PHARM REV CODE 636 W HCPCS: Mod: JZ,TB

## 2025-01-14 PROCEDURE — 36415 COLL VENOUS BLD VENIPUNCTURE: CPT | Performed by: INTERNAL MEDICINE

## 2025-01-14 PROCEDURE — 63600175 PHARM REV CODE 636 W HCPCS: Performed by: STUDENT IN AN ORGANIZED HEALTH CARE EDUCATION/TRAINING PROGRAM

## 2025-01-14 PROCEDURE — 25000003 PHARM REV CODE 250

## 2025-01-14 PROCEDURE — 51798 US URINE CAPACITY MEASURE: CPT

## 2025-01-14 PROCEDURE — 51701 INSERT BLADDER CATHETER: CPT

## 2025-01-14 PROCEDURE — 37000009 HC ANESTHESIA EA ADD 15 MINS: Performed by: ORTHOPAEDIC SURGERY

## 2025-01-14 PROCEDURE — 99900031 HC PATIENT EDUCATION (STAT)

## 2025-01-14 PROCEDURE — 27000221 HC OXYGEN, UP TO 24 HOURS

## 2025-01-14 PROCEDURE — 97162 PT EVAL MOD COMPLEX 30 MIN: CPT

## 2025-01-14 PROCEDURE — 63600175 PHARM REV CODE 636 W HCPCS

## 2025-01-14 PROCEDURE — 11000001 HC ACUTE MED/SURG PRIVATE ROOM

## 2025-01-14 PROCEDURE — C1776 JOINT DEVICE (IMPLANTABLE): HCPCS | Performed by: ORTHOPAEDIC SURGERY

## 2025-01-14 PROCEDURE — 94761 N-INVAS EAR/PLS OXIMETRY MLT: CPT

## 2025-01-14 PROCEDURE — 36000711: Performed by: ORTHOPAEDIC SURGERY

## 2025-01-14 PROCEDURE — C1713 ANCHOR/SCREW BN/BN,TIS/BN: HCPCS | Performed by: ORTHOPAEDIC SURGERY

## 2025-01-14 PROCEDURE — 71000033 HC RECOVERY, INTIAL HOUR: Performed by: ORTHOPAEDIC SURGERY

## 2025-01-14 PROCEDURE — 0SPD09Z REMOVAL OF LINER FROM LEFT KNEE JOINT, OPEN APPROACH: ICD-10-PCS | Performed by: ORTHOPAEDIC SURGERY

## 2025-01-14 PROCEDURE — 02HV33Z INSERTION OF INFUSION DEVICE INTO SUPERIOR VENA CAVA, PERCUTANEOUS APPROACH: ICD-10-PCS | Performed by: ORTHOPAEDIC SURGERY

## 2025-01-14 PROCEDURE — 94799 UNLISTED PULMONARY SVC/PX: CPT

## 2025-01-14 PROCEDURE — 0SUW09Z SUPPLEMENT LEFT KNEE JOINT, TIBIAL SURFACE WITH LINER, OPEN APPROACH: ICD-10-PCS | Performed by: ORTHOPAEDIC SURGERY

## 2025-01-14 PROCEDURE — 27201423 OPTIME MED/SURG SUP & DEVICES STERILE SUPPLY: Performed by: ORTHOPAEDIC SURGERY

## 2025-01-14 PROCEDURE — 25000003 PHARM REV CODE 250: Performed by: NURSE PRACTITIONER

## 2025-01-14 PROCEDURE — 27486 REVISE/REPLACE KNEE JOINT: CPT | Mod: 52,LT,, | Performed by: ORTHOPAEDIC SURGERY

## 2025-01-14 PROCEDURE — 80053 COMPREHEN METABOLIC PANEL: CPT | Performed by: INTERNAL MEDICINE

## 2025-01-14 DEVICE — STIMULAN® RAPID CURE PROVIDED STERILE FOR SINGLE PATIENT USE. STIMULAN® RAPID CURE CONTAINS CALCIUM SULFATE POWDER AND MIXING SOLUTION IN PRE-MEASURED QUANTITIES SO THAT WHEN MIXED TOGETHER IN A STERILE MIXING BOWL, THE RESULTANT PASTE IS TO BE DIGITALLY PACKED INTO OPEN BONE VOID/GAP TO SET INSITU OR PLACED INTO THE MOULD PROVIDED, THE MIXTURE SETS TO FORM BEADS. THE BIODEGRADABLE, RADIOPAQUE BEADS ARE RESORBED IN APPROXIMATELY 30 – 60 DAYS WHEN USED IN ACCORDANCE WITH THE DEVICE LABELLING. STIMULAN® RAPID CURE IS MANUFACTURED FROM SYNTHETIC IMPLANT GRADE CALCIUM SULFATE DIHYDRATE(CASO4.2H2O) THAT RESORBS AND IS REPLACED WITH BONE DURING THE HEALING PROCESS. ALSO, AS THE BONE VOID FILLER BEADS ARE BIODEGRADABLE AND BIOCOMPATIBLE, THEY MAY BE USED AT AN INFECTED SITE.
Type: IMPLANTABLE DEVICE | Site: KNEE | Status: FUNCTIONAL
Brand: STIMULAN® RAPID CURE

## 2025-01-14 DEVICE — TIBIAL BEARING INSERT - CS
Type: IMPLANTABLE DEVICE | Site: KNEE | Status: FUNCTIONAL
Brand: TRIATHLON

## 2025-01-14 RX ORDER — DEXAMETHASONE SODIUM PHOSPHATE 4 MG/ML
INJECTION, SOLUTION INTRA-ARTICULAR; INTRALESIONAL; INTRAMUSCULAR; INTRAVENOUS; SOFT TISSUE
Status: DISCONTINUED | OUTPATIENT
Start: 2025-01-14 | End: 2025-01-14

## 2025-01-14 RX ORDER — VANCOMYCIN HYDROCHLORIDE 1 G/20ML
INJECTION, POWDER, LYOPHILIZED, FOR SOLUTION INTRAVENOUS
Status: DISPENSED
Start: 2025-01-14 | End: 2025-01-14

## 2025-01-14 RX ORDER — KETOROLAC TROMETHAMINE 10 MG/1
10 TABLET, FILM COATED ORAL
Status: ACTIVE | OUTPATIENT
Start: 2025-01-14 | End: 2025-01-14

## 2025-01-14 RX ORDER — POVIDONE-IODINE 10 %
SOLUTION, NON-ORAL TOPICAL
Status: DISCONTINUED | OUTPATIENT
Start: 2025-01-14 | End: 2025-01-14 | Stop reason: HOSPADM

## 2025-01-14 RX ORDER — CEFAZOLIN 2 G/1
2 INJECTION, POWDER, FOR SOLUTION INTRAMUSCULAR; INTRAVENOUS
Status: DISCONTINUED | OUTPATIENT
Start: 2025-01-14 | End: 2025-01-17 | Stop reason: HOSPADM

## 2025-01-14 RX ORDER — GLYCOPYRROLATE 0.2 MG/ML
INJECTION INTRAMUSCULAR; INTRAVENOUS
Status: DISCONTINUED | OUTPATIENT
Start: 2025-01-14 | End: 2025-01-14

## 2025-01-14 RX ORDER — GENTAMICIN SULFATE 80 MG/100ML
INJECTION, SOLUTION INTRAVENOUS
Status: COMPLETED | OUTPATIENT
Start: 2025-01-14 | End: 2025-01-14

## 2025-01-14 RX ORDER — LIDOCAINE HYDROCHLORIDE 20 MG/ML
INJECTION, SOLUTION EPIDURAL; INFILTRATION; INTRACAUDAL; PERINEURAL
Status: DISCONTINUED | OUTPATIENT
Start: 2025-01-14 | End: 2025-01-14

## 2025-01-14 RX ORDER — GENTAMICIN 40 MG/ML
INJECTION, SOLUTION INTRAMUSCULAR; INTRAVENOUS
Status: DISPENSED
Start: 2025-01-14 | End: 2025-01-14

## 2025-01-14 RX ORDER — ACETAMINOPHEN 500 MG
1000 TABLET ORAL
Status: DISCONTINUED | OUTPATIENT
Start: 2025-01-14 | End: 2025-01-17 | Stop reason: HOSPADM

## 2025-01-14 RX ORDER — TRANEXAMIC ACID 650 MG/1
1950 TABLET ORAL
Status: ACTIVE | OUTPATIENT
Start: 2025-01-14 | End: 2025-01-14

## 2025-01-14 RX ORDER — MIDAZOLAM HYDROCHLORIDE 1 MG/ML
INJECTION INTRAMUSCULAR; INTRAVENOUS
Status: DISCONTINUED | OUTPATIENT
Start: 2025-01-14 | End: 2025-01-14

## 2025-01-14 RX ORDER — GABAPENTIN 300 MG/1
300 CAPSULE ORAL
Status: DISCONTINUED | OUTPATIENT
Start: 2025-01-14 | End: 2025-01-17 | Stop reason: HOSPADM

## 2025-01-14 RX ORDER — VANCOMYCIN HYDROCHLORIDE 1 G/20ML
INJECTION, POWDER, LYOPHILIZED, FOR SOLUTION INTRAVENOUS
Status: DISCONTINUED | OUTPATIENT
Start: 2025-01-14 | End: 2025-01-14 | Stop reason: HOSPADM

## 2025-01-14 RX ORDER — ONDANSETRON HYDROCHLORIDE 2 MG/ML
INJECTION, SOLUTION INTRAVENOUS
Status: DISCONTINUED | OUTPATIENT
Start: 2025-01-14 | End: 2025-01-14

## 2025-01-14 RX ORDER — PROPOFOL 10 MG/ML
VIAL (ML) INTRAVENOUS CONTINUOUS PRN
Status: DISCONTINUED | OUTPATIENT
Start: 2025-01-14 | End: 2025-01-14

## 2025-01-14 RX ORDER — BUPIVACAINE HYDROCHLORIDE 7.5 MG/ML
INJECTION, SOLUTION EPIDURAL; RETROBULBAR
Status: COMPLETED | OUTPATIENT
Start: 2025-01-14 | End: 2025-01-14

## 2025-01-14 RX ORDER — HYDROMORPHONE HYDROCHLORIDE 2 MG/ML
INJECTION, SOLUTION INTRAMUSCULAR; INTRAVENOUS; SUBCUTANEOUS
Status: COMPLETED
Start: 2025-01-14 | End: 2025-01-14

## 2025-01-14 RX ORDER — HYDROMORPHONE HYDROCHLORIDE 2 MG/ML
0.4 INJECTION, SOLUTION INTRAMUSCULAR; INTRAVENOUS; SUBCUTANEOUS EVERY 5 MIN PRN
Status: DISCONTINUED | OUTPATIENT
Start: 2025-01-14 | End: 2025-01-17 | Stop reason: HOSPADM

## 2025-01-14 RX ORDER — SODIUM CHLORIDE 0.9 % (FLUSH) 0.9 %
10 SYRINGE (ML) INJECTION EVERY 12 HOURS PRN
Status: DISCONTINUED | OUTPATIENT
Start: 2025-01-14 | End: 2025-01-17 | Stop reason: HOSPADM

## 2025-01-14 RX ADMIN — SENNOSIDES AND DOCUSATE SODIUM 2 TABLET: 50; 8.6 TABLET ORAL at 08:01

## 2025-01-14 RX ADMIN — LIDOCAINE HYDROCHLORIDE 50 MG: 20 INJECTION, SOLUTION EPIDURAL; INFILTRATION; INTRACAUDAL; PERINEURAL at 12:01

## 2025-01-14 RX ADMIN — OXYCODONE AND ACETAMINOPHEN 1 TABLET: 7.5; 325 TABLET ORAL at 01:01

## 2025-01-14 RX ADMIN — LEVOTHYROXINE SODIUM 50 MCG: 0.05 TABLET ORAL at 08:01

## 2025-01-14 RX ADMIN — CEFEPIME 2 G: 2 INJECTION, POWDER, FOR SOLUTION INTRAVENOUS at 12:01

## 2025-01-14 RX ADMIN — HYDROMORPHONE HYDROCHLORIDE 0.4 MG: 2 INJECTION INTRAMUSCULAR; INTRAVENOUS; SUBCUTANEOUS at 02:01

## 2025-01-14 RX ADMIN — DEXAMETHASONE SODIUM PHOSPHATE 4 MG: 4 INJECTION, SOLUTION INTRA-ARTICULAR; INTRALESIONAL; INTRAMUSCULAR; INTRAVENOUS; SOFT TISSUE at 12:01

## 2025-01-14 RX ADMIN — SODIUM CHLORIDE: 9 INJECTION, SOLUTION INTRAVENOUS at 11:01

## 2025-01-14 RX ADMIN — ONDANSETRON HYDROCHLORIDE 4 MG: 2 SOLUTION INTRAMUSCULAR; INTRAVENOUS at 12:01

## 2025-01-14 RX ADMIN — VANCOMYCIN HYDROCHLORIDE 1500 MG: 1.5 INJECTION, POWDER, LYOPHILIZED, FOR SOLUTION INTRAVENOUS at 08:01

## 2025-01-14 RX ADMIN — GABAPENTIN 300 MG: 300 CAPSULE ORAL at 08:01

## 2025-01-14 RX ADMIN — VALSARTAN 320 MG: 160 TABLET, FILM COATED ORAL at 08:01

## 2025-01-14 RX ADMIN — PANTOPRAZOLE SODIUM 40 MG: 40 INJECTION, POWDER, LYOPHILIZED, FOR SOLUTION INTRAVENOUS at 09:01

## 2025-01-14 RX ADMIN — SODIUM CHLORIDE, SODIUM GLUCONATE, SODIUM ACETATE, POTASSIUM CHLORIDE AND MAGNESIUM CHLORIDE: 526; 502; 368; 37; 30 INJECTION, SOLUTION INTRAVENOUS at 11:01

## 2025-01-14 RX ADMIN — CEFEPIME 2 G: 2 INJECTION, POWDER, FOR SOLUTION INTRAVENOUS at 02:01

## 2025-01-14 RX ADMIN — OXYCODONE AND ACETAMINOPHEN 1 TABLET: 7.5; 325 TABLET ORAL at 06:01

## 2025-01-14 RX ADMIN — GLYCOPYRROLATE 0.2 MG: 0.2 INJECTION INTRAMUSCULAR; INTRAVENOUS at 11:01

## 2025-01-14 RX ADMIN — PANTOPRAZOLE SODIUM 40 MG: 40 INJECTION, POWDER, LYOPHILIZED, FOR SOLUTION INTRAVENOUS at 08:01

## 2025-01-14 RX ADMIN — PROPOFOL 100 MCG/KG/MIN: 10 INJECTION, EMULSION INTRAVENOUS at 12:01

## 2025-01-14 RX ADMIN — MIDAZOLAM 2 MG: 1 INJECTION INTRAMUSCULAR; INTRAVENOUS at 11:01

## 2025-01-14 RX ADMIN — SODIUM CHLORIDE, SODIUM GLUCONATE, SODIUM ACETATE, POTASSIUM CHLORIDE AND MAGNESIUM CHLORIDE: 526; 502; 368; 37; 30 INJECTION, SOLUTION INTRAVENOUS at 01:01

## 2025-01-14 RX ADMIN — SODIUM CHLORIDE: 9 INJECTION, SOLUTION INTRAVENOUS at 05:01

## 2025-01-14 RX ADMIN — OXYCODONE AND ACETAMINOPHEN 1 TABLET: 7.5; 325 TABLET ORAL at 10:01

## 2025-01-14 RX ADMIN — BUPIVACAINE HYDROCHLORIDE 2 ML: 7.5 INJECTION, SOLUTION EPIDURAL; RETROBULBAR at 12:01

## 2025-01-14 RX ADMIN — CEFEPIME 2 G: 2 INJECTION, POWDER, FOR SOLUTION INTRAVENOUS at 08:01

## 2025-01-14 RX ADMIN — OXYCODONE AND ACETAMINOPHEN 1 TABLET: 7.5; 325 TABLET ORAL at 08:01

## 2025-01-14 NOTE — BRIEF OP NOTE
[facility] - Orthopaedics  Brief Operative Note    SUMMARY     Surgery Date: 1/14/2025     Surgeons and Role:     * Pablito Abad Jr., MD - Primary    Assisting Surgeon: None    Pre-op Diagnosis:  History of knee replacement [Z96.659]    Post-op Diagnosis:  Post-Op Diagnosis Codes:     * History of knee replacement [Z96.659]    Procedure(s) (LRB):  IRRIGATION AND DEBRIDEMENT, LOWER EXTREMITY (Left)  REVISION, ARTHROPLASTY, KNEE - poly (Left)    Anesthesia: Spinal    Implants:  Implant Name Type Inv. Item Serial No.  Lot No. LRB No. Used Action   KIT GRAFT BONE/SUB STIM 20ML - [id number]  KIT GRAFT BONE/SUB STIM 20ML  [employer] [id number] Left 1 Implanted   INSERT TIBIAL SZ 7 9MMX3 - [id number]  INSERT TIBIAL SZ 7 9MMX3  [employer] RONALD. [id number] Left 1 Implanted   INSERT TIBIAL SZ 7 9MMX3 - [id number]  INSERT TIBIAL SZ 7 9MMX3  [employer] ORNALD. [id number] Left 1 Explanted       Operative Findings: See dictation    Estimated Blood Loss: * No values recorded between 1/14/2025 12:31 PM and 1/14/2025  1:19 PM *    Estimated Blood Loss has been documented.         Specimens:   Specimen (24h ago, onward)      None            [id number]

## 2025-01-14 NOTE — PROGRESS NOTES
Inpatient Nutrition Assessment    Admit Date: 1/13/2025   Total duration of encounter: 1 day   Patient Age: 64 y.o.    Nutrition Recommendation/Prescription     NPO at this time. Adv diet once medically feasible. Goal diet: Regular.  Add Boost Original TID (provides 240 kcal and 10 g protein per serving).  Bowel regimen per MD.  \Will continue to monitor wt, labs, and po intake.    Communication of Recommendations: reviewed with family    Nutrition Assessment     Malnutrition Assessment/Nutrition-Focused Physical Exam       Malnutrition Level: other (see comments) (Does not meet criteria) (01/14/25 1231)  Energy Intake (Malnutrition): other (see comments) (Does not meet criteria) (01/14/25 1231)  Weight Loss (Malnutrition): other (see comments) (Does not meet criteria) (01/14/25 1231)                                         Fluid Accumulation (Malnutrition): other (see comments) (Unable to assess) (01/14/25 1231)     Hand  Strength, Right (Malnutrition): Unable to assess (01/14/25 1231)  A minimum of two characteristics is recommended for diagnosis of either severe or non-severe malnutrition.    Chart Review    Reason Seen: malnutrition screening tool (MST)    Malnutrition Screening Tool Results   Have you recently lost weight without trying?: Yes: 14-23 lbs  Have you been eating poorly because of a decreased appetite?: No   MST Score: 2   Diagnosis:  Left knee pain/infection  Failed Left knee arthroscopy  Epigastric abdominal pain  Uti     Relevant Medical History:    Arthritis      Bladder stone      BPH (benign prostatic hyperplasia)      Does use hearing aid      Dry eye      ED (erectile dysfunction)      Enlarged prostate with urinary obstruction      Hearing loss       wears hearing aids    HLD (hyperlipidemia)      HTN (hypertension)      Hypothyroidism, unspecified      Nocturia      Obesity      Personal history of colonic polyps 06/20/2018     Colonoscopy Report    Unspecified sensorineural hearing  loss      Urinary catheter in place      Uses walker        Scheduled Medications:  ceFEPime IV (PEDS and ADULTS), 2 g, Q8H  gabapentin, 300 mg, QHS  gentamicin, ,   levothyroxine, 50 mcg, Daily  pantoprazole, 40 mg, BID  polyethylene glycol, 17 g, Daily  senna-docusate 8.6-50 mg, 2 tablet, BID  tranexamic acid, 1,950 mg, Once Pre-Op  valsartan, 320 mg, QHS  vancomycin, ,   vancomycin 1,500 mg in D5W 250 mL IVPB (admixture device), 1,500 mg, Q12H    Continuous Infusions:  0.9% NaCl, Last Rate: 75 mL/hr at 01/14/25 0529    PRN Medications:  acetaminophen, 1,000 mg, On Call Procedure  acetaminophen, 325 mg, Q4H PRN  ceFAZolin (Ancef) IV (PEDS and ADULTS), 2 g, On Call Procedure  gabapentin, 300 mg, On Call Procedure  gentamicin, ,   hydrALAZINE, 10 mg, Q3H PRN  ketorolac, 10 mg, On Call Procedure  labetalol, 10 mg, Q4H PRN  methocarbamoL, 750 mg, Q8H PRN  morphine, 4 mg, Q3H PRN  ondansetron, 4 mg, Q6H PRN  oxyCODONE-acetaminophen, 1 tablet, Q4H PRN  oxyCODONE-acetaminophen, 1 tablet, Q4H PRN  traZODone, 50 mg, Nightly PRN  vancomycin, ,   vancomycin - pharmacy to dose, , pharmacy to manage frequency    Calorie Containing IV Medications: no significant kcals from medications at this time    Recent Labs   Lab 01/13/25  1607 01/13/25  2033 01/14/25  0411     --  138   K 4.3  --  4.2   CALCIUM 11.3*  --  10.2*     --  105   CO2 23  --  23   BUN 22.5  --  20.8   CREATININE 0.98  --  0.90   EGFRNORACEVR >60  --  >60   GLUCOSE 103  --  90   BILITOT 0.8  --  0.7   ALKPHOS 77  --  69   ALT 54  --  56*   AST 45*  --  43*   ALBUMIN 3.0*  --  2.6*   HSCRP  --  250.14*  --    LIPASE  --  23  --    WBC 11.28  --  9.99   HGB 14.1  --  12.9*   HCT 44.1  --  40.3*     Nutrition Orders:  Diet NPO      Appetite/Oral Intake: fair/not applicable  Factors Affecting Nutritional Intake: abdominal pain and decreased appetite  Social Needs Impacting Access to Food: none identified  Food/Episcopal/Cultural Preferences: unable to  "obtain  Food Allergies: no known food allergies  Last Bowel Movement: 01/11/25  Wound(s):  noted    Comments    1/14/25: Pt out of room for sx. Pt wife reports pt has had decreased appetite. Denies n/v. Last BM 3 days ago; bowel regimen per MD. Epigastric abd pain noted for past 2 weeks after eating that lasts 3 hrs. Pt wife reports he is a big eater normally. Unintentional wt loss reported (11% in 11 months-not significant). Denies issues c/s. Labs and meds reviewed; Alb 2.6. Will add ONS once diet is advanced.     Anthropometrics    Height: 6' 4" (193 cm), Height Method: Measured  Last Weight: 108.3 kg (238 lb 12.1 oz) (01/13/25 2155), Weight Method: Standard Scale  BMI (Calculated): 29.1  BMI Classification: overweight (BMI 25-29.9)        Ideal Body Weight (IBW), Male: 202 lb     % Ideal Body Weight, Male (lb): 118.2 %                          Usual Weight Provided By: EMR weight history    Wt Readings from Last 5 Encounters:   01/13/25 108.3 kg (238 lb 12.1 oz)   01/13/25 108.3 kg (238 lb 12.1 oz)   12/31/24 108.3 kg (238 lb 12.1 oz)   12/30/24 109.8 kg (242 lb 1 oz)   12/28/24 109.8 kg (242 lb)     Weight Change(s) Since Admission: no new wts  Wt Readings from Last 1 Encounters:   01/13/25 2155 108.3 kg (238 lb 12.1 oz)   01/13/25 1722 108.3 kg (238 lb 12.1 oz)   Admit Weight: 108.3 kg (238 lb 12.1 oz) (01/13/25 1722), Weight Method: Standard Scale    Estimated Needs    Weight Used For Calorie Calculations: 108.3 kg (238 lb 12.1 oz)  Energy Calorie Requirements (kcal): 2160 kcal (1.1SFxMSJ)  Energy Need Method: [place]- Jeor  Weight Used For Protein Calculations: 108.3 kg (238 lb 12.1 oz)  Protein Requirements:  g (0.9-1.1 g/kg)  Fluid Requirements (mL): 2160 mL/d (1.0 mL/kcal)        Enteral Nutrition     Patient not receiving enteral nutrition at this time.    Parenteral Nutrition     Patient not receiving parenteral nutrition support at this time.    Evaluation of Received Nutrient " Intake    Calories: not meeting estimated needs  Protein: not meeting estimated needs    Patient Education     Not applicable.    Nutrition Diagnosis     PES: Inadequate oral intake related to inability to consume sufficient nutrients as evidenced by decreased intake 2/2 abd pain after eating. (new)     PES:            Nutrition Interventions     Intervention(s): general/healthful diet, commercial beverage, and collaboration with other providers  Intervention(s): Oral nutritional supplement    Goal: Meet greater than 80% of nutritional needs by follow-up. (new)    Nutrition Goals & Monitoring     Dietitian will monitor: food and beverage intake, weight, weight change, and gastrointestinal profile  Discharge planning: resume home regimen  Nutrition Risk/Follow-Up: low (follow-up in 5-7 days)   Please consult if re-assessment needed sooner.

## 2025-01-14 NOTE — TRANSFER OF CARE
"Anesthesia Transfer of Care Note    Patient: Zachery Cordero    Procedure(s) Performed: Procedure(s) (LRB):  IRRIGATION AND DEBRIDEMENT, LOWER EXTREMITY (Left)  REVISION, ARTHROPLASTY, KNEE - poly (Left)    Patient location: PACU    Anesthesia Type: general and spinal    Transport from OR: Transported from OR on room air with adequate spontaneous ventilation    Post pain: adequate analgesia    Post assessment: no apparent anesthetic complications and tolerated procedure well    Post vital signs: stable    Level of consciousness: awake    Nausea/Vomiting: no nausea/vomiting    Complications: none    Transfer of care protocol was followed    Last vitals: Visit Vitals  BP (!) 134/90   Pulse 93   Temp 36.6 °C (97.8 °F) (Oral)   Resp 20   Ht 6' 4" (1.93 m)   Wt 108.3 kg (238 lb 12.1 oz)   SpO2 96%   BMI 29.06 kg/m²     "

## 2025-01-14 NOTE — ANESTHESIA POSTPROCEDURE EVALUATION
Anesthesia Post Evaluation    Patient: Zachery Cordero    Procedure(s) Performed: Procedure(s) (LRB):  IRRIGATION AND DEBRIDEMENT, LOWER EXTREMITY (Left)  REVISION, ARTHROPLASTY, KNEE - poly (Left)    Final Anesthesia Type: spinal      Patient location during evaluation: PACU  Patient participation: Yes- Able to Participate  Level of consciousness: awake and alert  Post-procedure vital signs: reviewed and stable  Pain management: adequate  Airway patency: patent    PONV status at discharge: No PONV  Anesthetic complications: no      Respiratory status: unassisted  Hydration status: euvolemic  Follow-up not needed.              Vitals Value Taken Time   /92 01/14/25 1425   Temp 36 °C (96.8 °F) 01/14/25 1400   Pulse 76 01/14/25 1448   Resp 26 01/14/25 1427   SpO2 99 % 01/14/25 1448   Vitals shown include unfiled device data.      Event Time   Out of Recovery 01/14/2025 14:30:00         Pain/Sheree Score: Pain Rating Prior to Med Admin: 7 (1/14/2025  2:20 PM)  Pain Rating Post Med Admin: 0 (1/14/2025 11:13 AM)  Sheree Score: 9 (1/14/2025  2:02 PM)

## 2025-01-14 NOTE — ANESTHESIA PROCEDURE NOTES
Spinal    Diagnosis: Osteoarthritis  Patient location during procedure: OR  Start time: 1/14/2025 12:03 PM  Timeout: 1/14/2025 12:03 PM  End time: 1/14/2025 12:07 PM    Staffing  Authorizing Provider: Fermín Glasgow MD  Performing Provider: Fermín Glasgow MD    Staffing  Performed by: Fermín Glasgow MD  Authorized by: Fermín Glasgow MD    Preanesthetic Checklist  Completed: patient identified, IV checked, site marked, risks and benefits discussed, surgical consent, monitors and equipment checked, pre-op evaluation and timeout performed  Spinal Block  Patient position: sitting  Prep: ChloraPrep  Patient monitoring: heart rate, cardiac monitor, continuous pulse ox and frequent blood pressure checks  Approach: midline  Location: L3-4  Injection technique: single shot  CSF Fluid: clear free-flowing CSF  Needle  Needle type: pencil-tip   Needle gauge: 25 G  Needle length: 3.5 in  Additional Documentation: incremental injection, negative aspiration for heme and no paresthesia on injection  Needle localization: anatomical landmarks  Assessment  Ease of block: easy  Patient's tolerance of the procedure: comfortable throughout block and no complaints  Additional Notes  Straightforward, one pass, + csf pre and post aspiration, no paresthesias  Medications:    Medications: bupivacaine (pf) (MARCAINE) injection 0.75% - Intraspinal   2 mL - 1/14/2025 12:07:00 PM

## 2025-01-14 NOTE — PROGRESS NOTES
"Pharmacokinetic Initial Assessment: IV Vancomycin    Assessment/Plan:    Initiate intravenous vancomycin with loading dose of 2000 mg once followed by a maintenance dose of vancomycin 1500mg IV every 12 hours  Desired empiric serum trough concentration is 15 to 20 mcg/mL  Draw vancomycin trough on 1/15/25 at 0800  Pharmacy will continue to follow and monitor vancomycin.        Patient brief summary:  Zachery Cordero is a 64 y.o. male initiated on antimicrobial therapy with IV Vancomycin for treatment of suspected bone/joint infection    Drug Allergies:   Review of patient's allergies indicates:  No Known Allergies    Actual Body Weight:   108.3 kg    Renal Function:   Estimated Creatinine Clearance: 100.1 mL/min (based on SCr of 0.98 mg/dL).,     Dialysis Method (if applicable):  N/A    CBC (last 72 hours):  Recent Labs   Lab Result Units 01/13/25  1607   WBC x10(3)/mcL 11.28   Hgb g/dL 14.1   Hct % 44.1   Platelet x10(3)/mcL 293   Mono % % 6.6   Eos % % 0.5   Basophil % % 0.2       Metabolic Panel (last 72 hours):  Recent Labs   Lab Result Units 01/13/25  1607 01/13/25  1618   Sodium mmol/L 137  --    Potassium mmol/L 4.3  --    Chloride mmol/L 101  --    CO2 mmol/L 23  --    Glucose mg/dL 103  --    Glucose, UA   --  Negative   Blood Urea Nitrogen mg/dL 22.5  --    Creatinine mg/dL 0.98  --    Albumin g/dL 3.0*  --    Bilirubin Total mg/dL 0.8  --    ALP unit/L 77  --    AST unit/L 45*  --    ALT unit/L 54  --        Drug levels (last 3 results):  No results for input(s): "VANCOMYCINRA", "VANCORANDOM", "VANCOMYCINPE", "VANCOPEAK", "VANCOMYCINTR", "VANCOTROUGH" in the last 72 hours.    Microbiologic Results:  Microbiology Results (last 7 days)       Procedure Component Value Units Date/Time    Gram Stain [[id number]] Collected: 01/13/25 9356    Order Status: Completed Specimen: Body Fluid Updated: 01/13/25 1822     GRAM STAIN Many WBC observed      No bacteria seen    Blood Culture [[id number]]     Order Status: " Sent Specimen: Blood     Blood Culture [[id number]]     Order Status: Sent Specimen: Blood     Urine culture [[id number]] Collected: 01/13/25 1618    Order Status: Sent Specimen: Urine Updated: 01/13/25 1642

## 2025-01-14 NOTE — PROCEDURES
"Zachery Cordero is a 64 y.o. male patient.    Temp: 96.8 °F (36 °C) (01/14/25 1400)  Pulse: 76 (01/14/25 1448)  Resp: 18 (01/14/25 1410)  BP: 124/82 (01/14/25 1400)  SpO2: 99 % (01/14/25 1448)  Weight: 108.3 kg (238 lb 12.1 oz) (01/13/25 2155)  Height: 6' 4" (193 cm) (01/13/25 2155)    PICC  Date/Time: 1/14/2025 5:59 PM  Performed by: Ra Boudreaux RN  Consent Done: Yes  Indications: med administration  Preparation: skin prepped with ChloraPrep  Skin prep agent dried: skin prep agent completely dried prior to procedure  Sterile barriers: all five maximum sterile barriers used - cap, mask, sterile gown, sterile gloves, and large sterile sheet  Hand hygiene: hand hygiene performed prior to central venous catheter insertion  Location details: right basilic  Catheter type: double lumen  Catheter size: 5 Fr  Catheter Length: 43cm    Number of attempts: 1  Post-procedure: blood return through all ports and sterile dressing applied    Assessment: placement verified by x-ray        Ra Boudreaux Rn  1/14/2025    "

## 2025-01-14 NOTE — ANESTHESIA PREPROCEDURE EVALUATION
01/14/2025  Zachery Cordero is a 64 y.o., male.    Na 138, K 4.2, Cr 0.90  13 / 40 / 264k  Ecg sinus tach, RBBB  No issues with prior anesthetics    Pre-op Assessment    I have reviewed the NPO Status.      Review of Systems  Cardiovascular:     Hypertension                                    Hypertension         Musculoskeletal:  Arthritis               Endocrine:   Hypothyroidism              Physical Exam  General: Well nourished, Cooperative and Alert    Airway:  Mallampati: II   Neck ROM: Normal ROM        Anesthesia Plan  Type of Anesthesia, risks & benefits discussed:    Anesthesia Type: Spinal  Intra-op Monitoring Plan: Standard ASA Monitors  Post Op Pain Control Plan: multimodal analgesia and peripheral nerve block  Induction:  IV  Informed Consent: Informed consent signed with the Patient and all parties understand the risks and agree with anesthesia plan.  All questions answered.   ASA Score: 2  Day of Surgery Review of History & Physical: H&P Update referred to the surgeon/provider.  Anesthesia Plan Notes:   Analgesic adductor canal block     Ready For Surgery From Anesthesia Perspective.     .

## 2025-01-14 NOTE — CONSULTS
Tele-Infectious Diseases Consultation      Patient Name: Zachery Cordero  Patient : 1960  Age/Sex: 64 y.o. male  Room/Bed: 309/309 A  Admission Date/Time: 2025  2:44 PM  Date of consultation:  2025  Referring MD: Pablito Abad Jr., MD       Assessment and Plan:     Zachery Cordero is a 64 y.o. male with:  left knee prosthetic joint infection  :  Arthrocentesis showed 115,000 nucleated cells with neutrophil predominance, culture growing Gram-negative kurt.  S/p left knee arthroplasty 2024    Impression:     The source of this patient's knee infection unclear.  Blood culture is pending at this time to evaluate for possible hematogenous spread. He grew ESBL Serratia back in  but I do not see any other microbiology data in the system.  The isolated Serratia was sensitive to cefepime.  He is going to the OR today with Orthopedic team for surgical intervention, we will follow up on impression/culture results.  Given the growth of GNR, we will hold off on further vancomycin.  Agree with continuing cefepime for time being.    Recommendations:     Pending blood cultures obtained    Pending final identification and sensitivity of isolated Gram-negative kurt from arthrocentesis culture   Planned to go to the OR today we will follow up on final OR impression/culture   Agree with continuing IV cefepime  Okay to hold off on further vancomycin  Final length/route of antibiotics to be determined based on final culture results and what kind of surgical intervention patient is going to undergo.  He will likely need a course of IV antibiotic upon discharge through a PICC line.  Patient will be evaluated by ID team in person tomorrow.    The antibiotics being administered require intensive monitoring for drug toxicity    Thank you for allowing us to contribute to this patient's care. Please call ID with any questions.     Elsie Noguera MD  Attending, Infectious Disease     Reason for  consultation:      Prosthetic left knee infection    Chief complain:      Left knee pain and swelling    History of present illness:      Zachery Cordero is a 64 y.o. male with a history significant for left total knee arthroplasty 06/11/2024 , status post prostatectomy who was admitted on 1/13/2025 with left knee pain and swelling.  I tried calling the patient to get more information from him but his phone was off.  Also tried calling his spouse but no one picked up the phone. I reviewed patient's medical records. Upon presenting to the hospital, patient's vital signs showed temperature 100.9° F. laboratory workup showed WBC 11.28, .  He underwent aspiration of his knee which showed 115,000 nucleated cells with neutrophil predominance, culture growing Gram-negative kurt.    Review of system:      Not done    Antibiotics Received:     Vancomycin 1/13-  Cefepime 1/13-    Social history:      Social History     Socioeconomic History    Marital status:    Tobacco Use    Smoking status: Some Days     Types: Cigars     Passive exposure: Yes    Smokeless tobacco: Never    Tobacco comments:     1-2 cigars a month.   Substance and Sexual Activity    Alcohol use: Yes     Alcohol/week: 10.0 standard drinks of alcohol     Types: 10 Cans of beer per week     Comment: 1-2 a day and sometimes none    Drug use: Never    Sexual activity: Yes     Partners: Female     Birth control/protection: Post-menopausal     Social Drivers of Health     Financial Resource Strain: Patient Declined (1/13/2025)    Overall Financial Resource Strain (CARDIA)     Difficulty of Paying Living Expenses: Patient declined   Food Insecurity: Patient Declined (1/13/2025)    Hunger Vital Sign     Worried About Running Out of Food in the Last Year: Patient declined     Ran Out of Food in the Last Year: Patient declined   Transportation Needs: Patient Declined (1/13/2025)    TRANSPORTATION NEEDS     Transportation : Patient declined   Physical  Activity: Sufficiently Active (9/11/2024)    Exercise Vital Sign     Days of Exercise per Week: 4 days     Minutes of Exercise per Session: 50 min   Recent Concern: Physical Activity - Insufficiently Active (6/24/2024)    Exercise Vital Sign     Days of Exercise per Week: 2 days     Minutes of Exercise per Session: 10 min   Stress: Patient Declined (1/13/2025)    [employer] - Occupational Stress Questionnaire     Feeling of Stress : Patient declined   Housing Stability: Patient Declined (1/13/2025)    Housing Stability Vital Sign     Unable to Pay for Housing in the Last Year: Patient declined     Homeless in the Last Year: Patient declined       Past medical history:     Past Medical History:   Diagnosis Date    Arthritis     Bladder stone     BPH (benign prostatic hyperplasia)     Does use hearing aid     Dry eye     ED (erectile dysfunction)     Enlarged prostate with urinary obstruction     Hearing loss     wears hearing aids    HLD (hyperlipidemia)     HTN (hypertension)     Hypothyroidism, unspecified     Nocturia     Obesity     Personal history of colonic polyps 06/20/2018    Colonoscopy Report    Unspecified sensorineural hearing loss     Urinary catheter in place     Uses walker        Past Surgical history:     Past Surgical History:   Procedure Laterality Date    CATARACT EXTRACTION W/  INTRAOCULAR LENS IMPLANT Right     COLONOSCOPY  06/20/2018    COLONOSCOPY W/ POLYPECTOMY  06/27/2023    KNEE ARTHROSCOPY W/ MENISCAL REPAIR Right     MOUTH SURGERY      salivary gland    ROBOT-ASSISTED LAPAROSCOPIC SIMPLE PROSTATECTOMY USING DA LOUIE XI N/A 12/31/2024    Procedure: XI ROBOTIC PROSTATECTOMY, SIMPLE;  Surgeon: Kieran Townsend MD;  Location[facility];  Service: Urology;  Laterality: N/A;  PLUS REMOVE BLADDER STONE    ROBOTIC ARTHROPLASTY, KNEE Left 06/11/2024    Procedure: SDD- ROBOTIC ARTHROPLASTY, KNEE, TOTAL;  Surgeon: Pablito Abad Jr., MD;  Location: [facility] OR;   Service: Orthopedics;  Laterality: Left;    ROBOTIC ARTHROPLASTY, KNEE Right 12/10/2024    Procedure: SDD - ROBOTIC ARTHROPLASTY, KNEE, TOTAL;  Surgeon: Pablito Abad Jr., MD;  Location: [facility];  Service: Orthopedics;  Laterality: Right;       Family history:     Family History   Problem Relation Name Age of Onset    Hypertension Mother      Hypertension Father Jaren Cordero     Brain aneurysm Father Jaren Cordero     Arthritis Father Jaren Cordero        Allergy history:    Review of patient's allergies indicates:  No Known Allergies    Objective:    Vital signs:  Vitals:    25 0741 25 0854 25 1013 25 1115   BP: (!) 142/92   126/87   BP Location:    Left arm   Patient Position:    Lying   Pulse: 87   92   Resp: 18  16 20   Temp: 97.8 °F (36.6 °C)      TempSrc: Oral      SpO2: 98% 98%  95%   Weight:       Height:         Temp (24hrs), Av.9 °F (37.2 °C), Min:97.8 °F (36.6 °C), Max:100.9 °F (38.3 °C)      Physical examination:  No physical exam was done    Diagnostic data:     CBC  Recent Labs   Lab 25  1607 25  0411   WBC 11.28 9.99   HGB 14.1 12.9*    264       BMP  Recent Labs   Lab 25  1607 25  0411    138   K 4.3 4.2    105   CO2 23 23   BUN 22.5 20.8   CREATININE 0.98 0.90   CALCIUM 11.3* 10.2*     Recent Labs   Lab 25  1607 25  0411   WBC 11.28 9.99   HGB 14.1 12.9*   HCT 44.1 40.3*    264     Estimated Creatinine Clearance: 108.9 mL/min (based on SCr of 0.9 mg/dL).    Lab Results   Component Value Date    CREATININE 0.90 2025    CREATININE 0.98 2025    CREATININE 0.87 2025    ALKPHOS 69 2025    ALKPHOS 77 2025    ALKPHOS 62 2024     Microbiology and ID tests:     Microbiology Results (last 7 days)       Procedure Component Value Units Date/Time    Urine culture [[id number]]  (Abnormal) Collected: 25 1618    Order Status: Completed Specimen: Urine Updated: 25  0725     Urine Culture >/= 100,000 colonies/ml Gram-negative Rods    Blood Culture [[id number]] Collected: 01/13/25 2033    Order Status: Resulted Specimen: Blood from Antecubital, Left Updated: 01/13/25 2034    Blood Culture [[id number]] Collected: 01/13/25 2033    Order Status: Resulted Specimen: Blood from Hand, Right Updated: 01/13/25 2034    Gram Stain [[id number]] Collected: 01/13/25 1537    Order Status: Completed Specimen: Body Fluid Updated: 01/13/25 1822     GRAM STAIN Many WBC observed      No bacteria seen              Medications   Inpatient  Scheduled Meds:   gentamicin        vancomycin        ceFEPime IV (PEDS and ADULTS)  2 g Intravenous Q8H    gabapentin  300 mg Oral QHS    levothyroxine  50 mcg Oral Daily    pantoprazole  40 mg Intravenous BID    polyethylene glycol  17 g Oral Daily    senna-docusate 8.6-50 mg  2 tablet Oral BID    tranexamic acid  1,950 mg Oral Once Pre-Op    valsartan  320 mg Oral QHS    vancomycin 1,500 mg in D5W 250 mL IVPB (admixture device)  1,500 mg Intravenous Q12H     Continuous Infusions:   0.9% NaCl   Intravenous Continuous 75 mL/hr at 01/14/25 0529 New Bag at 01/14/25 0529     PRN Meds:.  Current Facility-Administered Medications:     gentamicin, , ,     vancomycin, , ,     acetaminophen, 1,000 mg, Oral, On Call Procedure    acetaminophen, 325 mg, Oral, Q4H PRN    ceFAZolin (Ancef) IV (PEDS and ADULTS), 2 g, Intravenous, On Call Procedure    gabapentin, 300 mg, Oral, On Call Procedure    hydrALAZINE, 10 mg, Intravenous, Q3H PRN    ketorolac, 10 mg, Oral, On Call Procedure    labetalol, 10 mg, Intravenous, Q4H PRN    methocarbamoL, 750 mg, Oral, Q8H PRN    morphine, 4 mg, Intravenous, Q3H PRN    ondansetron, 4 mg, Oral, Q6H PRN    oxyCODONE-acetaminophen, 1 tablet, Oral, Q4H PRN    oxyCODONE-acetaminophen, 1 tablet, Oral, Q4H PRN    traZODone, 50 mg, Oral, Nightly PRN    vancomycin - pharmacy to dose, , Intravenous, pharmacy to manage frequency    Home Meds  Current  Outpatient Medications   Medication Instructions    ascorbic acid (vitamin C) (VITAMIN C) 500 mg, Daily    aspirin (ECOTRIN) 81 mg, Oral, Daily    b complex vitamins tablet 1 tablet, Daily    cycloSPORINE (VEVYE) 0.1 % Drop 1 drop, 2 times daily    ethyl alcohoL (NOZIN NASAL ) 62 % Kit 2 times daily    finasteride (PROSCAR) 5 mg, Daily    folic acid (FOLVITE) 1 mg, Daily    gabapentin (NEURONTIN) 300 mg, Oral, Nightly, Take 1 capsule at night for three nights. Can increase to three times per day as needed.    glucosamine-chondroitin 500-400 mg tablet 1 tablet, 2 times daily    HYDROcodone-acetaminophen (NORCO) 5-325 mg per tablet 1 tablet, Every 6 hours PRN    ketorolac (TORADOL) 10 mg, Every 6 hours    levothyroxine (SYNTHROID) 50 MCG tablet TAKE 1 TABLET BY MOUTH DAILY    meloxicam (MOBIC) 15 mg, Oral, Daily    multivitamin (ONE DAILY MULTIVITAMIN) per tablet 1 tablet, Daily    NON FORMULARY MEDICATION 1 tablet, Nightly    NON FORMULARY MEDICATION 1 tablet, Daily    omega-3 fatty acids/fish oil (FISH OIL-OMEGA-3 FATTY ACIDS) 300-1,000 mg capsule Daily    ondansetron (ZOFRAN) 8 mg, Every 8 hours PRN    oxyCODONE-acetaminophen (PERCOCET) 5-325 mg per tablet 1 tablet, Oral, Every 6 hours PRN    phenazopyridine (PYRIDIUM) 100 mg, 3 times daily PRN    prasterone, DHEA, (DHEA ORAL) 1 tablet, Nightly    saw palmetto 500 mg, 2 times daily    simvastatin (ZOCOR) 20 MG tablet TAKE 1 TABLET BY MOUTH EVERY DAY AT BEDTIME    tadalafiL (CIALIS) 20 mg, Daily    tamsulosin (FLOMAX) 0.4 mg, 2 times daily    telmisartan (MICARDIS) 80 MG Tab TAKE 1 TABLET BY MOUTH DAILY    testosterone cypionate (DEPOTESTOTERONE CYPIONATE) 200 mg, Intramuscular, Every 14 days    traMADoL (ULTRAM) 50 mg, Oral, Every 8 hours PRN    vitamin D (VITAMIN D3) 1,000 Units, Daily    zinc gluconate 50 mg, Nightly       Diagnostic studies:      X-Ray Abdomen AP 1 View   Final Result      Nonspecific bowel gas pattern.         Electronically signed  by: Mukul Angel   Date:    01/13/2025   Time:    19:59      X-Ray Chest 1 View   Final Result      No acute chest disease is identified..      Hiatus hernia         Electronically signed by: Josué Lowery   Date:    01/13/2025   Time:    16:12        1/14/2025 11:34 AM    The attending portion of this evaluation, treatment, and documentation was performed per Elsie Noguera MD via Telemedicine AudioVisual using the secure the grafter software platform with 2 way audio/video. The provider was located off-site and the patient is located in the hospital. The aforementioned video software was utilized to document the relevant history and physical exam.     Critical care time spent: >75 minutes

## 2025-01-14 NOTE — CONSULTS
Hospital Medicine Consultation Note        Patient Name: Zachery Cordero  MRN: [id number]  Patient Class: IP- Inpatient   Admission Date: 1/13/2025  2:44 PM  Length of Stay: 0  Attending Physician: [unfilled]   Primary Care Provider: Josué Bass MD  Face-to-Face encounter date: 01/13/2025 g  Consulting Physician: Neno Castellon MD  Reason for Consult: medical management  Chief Complaint: No chief complaint on file.        Patient information was obtained from patient, patient's family, past medical records.    HISTORY OF PRESENT ILLNESS:   Zachery Cordero is a 64 y.o. male with  has a past medical history of Arthritis, Bladder stone, BPH (benign prostatic hyperplasia), Does use hearing aid, Dry eye, ED (erectile dysfunction), Enlarged prostate with urinary obstruction, Hearing loss, HLD (hyperlipidemia), HTN (hypertension), Hypothyroidism, unspecified, Nocturia, Obesity, Personal history of colonic polyps (06/20/2018), Unspecified sensorineural hearing loss, Urinary catheter in place, and Uses walker..admitted under Dr. Abad on 1/13/2025 with the diagnosis of left knee swelling,pain x7 days, hx of left TKA 6/11/24 and right knee arthroplasty 12/10/24.  Had prostatectomy 12/31/24.  +low grade fever, no cp/sob/n/v/d.  Pt has had 2 wk hx of epigastric abd pain consistently and daily after eating 4 bites of food, last 3hours.  Hospital Medicine team was consulted for medical management     PAST MEDICAL HISTORY:     Past Medical History:   Diagnosis Date    Arthritis     Bladder stone     BPH (benign prostatic hyperplasia)     Does use hearing aid     Dry eye     ED (erectile dysfunction)     Enlarged prostate with urinary obstruction     Hearing loss     wears hearing aids    HLD (hyperlipidemia)     HTN (hypertension)     Hypothyroidism, unspecified     Nocturia     Obesity     Personal history of colonic polyps 06/20/2018    Colonoscopy Report    Unspecified sensorineural hearing loss     Urinary catheter in  place     Uses walker        PAST SURGICAL HISTORY:     Past Surgical History:   Procedure Laterality Date    CATARACT EXTRACTION W/  INTRAOCULAR LENS IMPLANT Right     COLONOSCOPY  06/20/2018    COLONOSCOPY W/ POLYPECTOMY  06/27/2023    KNEE ARTHROSCOPY W/ MENISCAL REPAIR Right     MOUTH SURGERY      salivary gland    ROBOT-ASSISTED LAPAROSCOPIC SIMPLE PROSTATECTOMY USING DA LOUIE XI N/A 12/31/2024    Procedure: XI ROBOTIC PROSTATECTOMY, SIMPLE;  Surgeon: Kieran Townsend MD;  Location: [facility];  Service: Urology;  Laterality: N/A;  PLUS REMOVE BLADDER STONE    ROBOTIC ARTHROPLASTY, KNEE Left 06/11/2024    Procedure: SDD- ROBOTIC ARTHROPLASTY, KNEE, TOTAL;  Surgeon: Pablito Abad Jr., MD;  Location: [facility] OR;  Service: Orthopedics;  Laterality: Left;    ROBOTIC ARTHROPLASTY, KNEE Right 12/10/2024    Procedure: SDD - ROBOTIC ARTHROPLASTY, KNEE, TOTAL;  Surgeon: Pablito Abad Jr., MD;  Location: [facility];  Service: Orthopedics;  Laterality: Right;       ALLERGIES:   Patient has no known allergies.    FAMILY HISTORY:     Family History   Problem Relation Name Age of Onset    Hypertension Mother      Hypertension Father Jaren Cordero     Brain aneurysm Father Jaren Cordero     Arthritis Father Jaren Cordero         SOCIAL HISTORY:     Social History     Tobacco Use    Smoking status: Some Days     Types: Cigars     Passive exposure: Yes    Smokeless tobacco: Never    Tobacco comments:     1-2 cigars a month.   Substance Use Topics    Alcohol use: Yes     Alcohol/week: 10.0 standard drinks of alcohol     Types: 10 Cans of beer per week     Comment: 1-2 a day and sometimes none        HOME MEDICATIONS:     Prior to Admission medications    Medication Sig Start Date End Date Taking? Authorizing Provider   ascorbic acid, vitamin C, (VITAMIN C) 500 MG tablet Take 500 mg by mouth once daily.    Provider, Historical   aspirin (ECOTRIN) 81 MG EC tablet Take 1 tablet (81 mg total) by mouth once daily.  12/4/24 12/4/25  Pablito Abad Jr., MD   b complex vitamins tablet Take 1 tablet by mouth once daily.    Provider, Historical   cycloSPORINE (VEVYE) 0.1 % Drop Apply 1 drop to eye 2 (two) times a day.    Provider, Historical   ethyl alcohoL (NOZIN NASAL ) 62 % Kit Apply topically 2 (two) times a day.    Provider, Historical   finasteride (PROSCAR) 5 mg tablet Take 5 mg by mouth once daily.  Patient not taking: Reported on 1/13/2025    Provider, Historical   folic acid (FOLVITE) 1 MG tablet Take 1 mg by mouth once daily.    Provider, Historical   gabapentin (NEURONTIN) 300 MG capsule Take 1 capsule (300 mg total) by mouth every evening. Take 1 capsule at night for three nights. Can increase to three times per day as needed. 12/30/24   Kirti Espinal, ARINA   glucosamine-chondroitin 500-400 mg tablet Take 1 tablet by mouth 2 (two) times a day.    Provider, Historical   HYDROcodone-acetaminophen (NORCO) 5-325 mg per tablet Take 1 tablet by mouth every 6 (six) hours as needed for Pain.    Provider, Historical   ketorolac (TORADOL) 10 mg tablet Take 10 mg by mouth every 6 (six) hours.    Provider, Historical   levothyroxine (SYNTHROID) 50 MCG tablet TAKE 1 TABLET BY MOUTH DAILY 9/23/24   Josué Bsas MD   meloxicam (MOBIC) 15 MG tablet Take 1 tablet (15 mg total) by mouth once daily. 1/8/25 4/8/25  Pablito Abad Jr., MD   multivitamin (ONE DAILY MULTIVITAMIN) per tablet Take 1 tablet by mouth once daily.    Provider, Historical   NON FORMULARY MEDICATION Take 1 tablet by mouth every evening. L- argintine    Provider, Historical   NON FORMULARY MEDICATION Take 1 tablet by mouth once daily. Umary acid hyluonic    Provider, Historical   omega-3 fatty acids/fish oil (FISH OIL-OMEGA-3 FATTY ACIDS) 300-1,000 mg capsule Take by mouth once daily. 4 a day    Provider, Historical   ondansetron (ZOFRAN) 8 MG tablet Take 8 mg by mouth every 8 (eight) hours as needed for Nausea.  Patient not taking: Reported on  12/30/2024    Provider, Historical   oxyCODONE-acetaminophen (PERCOCET) 5-325 mg per tablet Take 1 tablet by mouth every 6 (six) hours as needed for Pain.  Patient not taking: Reported on 12/30/2024 12/4/24   Pablito Abad Jr., MD   phenazopyridine (PYRIDIUM) 100 MG tablet Take 100 mg by mouth 3 (three) times daily as needed for Pain.    Provider, Historical   prasterone, DHEA, (DHEA ORAL) Take 1 tablet by mouth every evening.    Provider, Historical   saw palmetto 500 MG capsule Take 500 mg by mouth 2 (two) times daily.  Patient not taking: Reported on 1/13/2025    Provider, Historical   simvastatin (ZOCOR) 20 MG tablet TAKE 1 TABLET BY MOUTH EVERY DAY AT BEDTIME 8/30/23   Josué Bass MD   tadalafiL (CIALIS) 20 MG Tab Take 20 mg by mouth once daily. 7/22/22   Provider, Historical   tamsulosin (FLOMAX) 0.4 mg Cap Take 0.4 mg by mouth 2 (two) times a day.  Patient not taking: Reported on 1/13/2025 7/6/22   Provider, Historical   telmisartan (MICARDIS) 80 MG Tab TAKE 1 TABLET BY MOUTH DAILY 3/27/24   Josué Bass MD   testosterone cypionate (DEPOTESTOTERONE CYPIONATE) 200 mg/mL injection Inject 1 mL (200 mg total) into the muscle every 14 (fourteen) days. 6/1/23   Kee Tiwari MD   traMADoL (ULTRAM) 50 mg tablet Take 1 tablet (50 mg total) by mouth every 8 (eight) hours as needed for Pain. 1/10/25   Pablito Abad Jr., MD   vitamin D (VITAMIN D3) 1000 units Tab Take 1,000 Units by mouth once daily.    Provider, Historical   zinc gluconate 50 mg tablet Take 50 mg by mouth every evening.    Provider, Historical       REVIEW OF SYSTEMS:   Review of Systems   All other systems reviewed and are negative.       PHYSICAL EXAM:   BP (!) 145/97   Pulse 110   Temp (!) 100.9 °F (38.3 °C) (Oral)   Resp 16   Wt 108.3 kg (238 lb 12.1 oz)   SpO2 98%   BMI 29.06 kg/m²     Physical Exam  Constitutional:       General: He is not in acute distress.     Appearance: He is not toxic-appearing.   HENT:      Head:  Normocephalic and atraumatic.      Nose: No congestion.   Eyes:      Extraocular Movements: Extraocular movements intact.      Pupils: Pupils are equal, round, and reactive to light.   Cardiovascular:      Rate and Rhythm: Regular rhythm. Tachycardia present.      Heart sounds: Normal heart sounds. No murmur heard.  Pulmonary:      Breath sounds: Normal breath sounds. No wheezing, rhonchi or rales.   Abdominal:      General: Bowel sounds are normal.      Palpations: Abdomen is soft.      Tenderness: There is no abdominal tenderness. There is no guarding.   Musculoskeletal:      Cervical back: Neck supple.      Comments: Left knee swelling/warm   Skin:     Findings: No rash.   Neurological:      General: No focal deficit present.      Mental Status: He is alert.   Psychiatric:         Mood and Affect: Mood normal.         Thought Content: Thought content normal.          LABS AND IMAGING:     Admission on 01/13/2025   Component Date Value Ref Range Status    Color BF 01/13/2025 Yellow   Final    Clarity BF 01/13/2025 Turbid   Final    WBC BF 01/13/2025 115,560  /uL Final    The reference interval(s) and other method performance specifications are unavailable for this body fluid. Comparison of the result with concentration in the blood, serum, or plasma is recommended.    Result obtained by manual dilution    Color, UA 01/13/2025 Straw  Yellow, Light-Yellow, Dark Yellow, Sabi, Straw Final    Appearance, UA 01/13/2025 Cloudy (A)  Clear Final    Specific Gravity, UA 01/13/2025 1.025  1.005 - 1.030 Final    pH, UA 01/13/2025 7.0  5.0 - 8.5 Final    Protein, UA 01/13/2025 30 (A)  Negative Final    Glucose, UA 01/13/2025 Negative  Negative, Normal Final    Ketones, UA 01/13/2025 40 (A)  Negative Final    Blood, UA 01/13/2025 Large (A)  Negative Final    Bilirubin, UA 01/13/2025 Negative  Negative Final    Urobilinogen, UA 01/13/2025 0.2  0.2, 1.0, Normal Final    Nitrites, UA 01/13/2025 Positive (A)  Negative Final     Leukocyte Esterase, UA 01/13/2025 Moderate (A)  Negative Final    Sodium 01/13/2025 137  136 - 145 mmol/L Final    Potassium 01/13/2025 4.3  3.5 - 5.1 mmol/L Final    Chloride 01/13/2025 101  98 - 107 mmol/L Final    CO2 01/13/2025 23  23 - 31 mmol/L Final    Glucose 01/13/2025 103  82 - 115 mg/dL Final    Blood Urea Nitrogen 01/13/2025 22.5  8.4 - 25.7 mg/dL Final    Creatinine 01/13/2025 0.98  0.72 - 1.25 mg/dL Final    Calcium 01/13/2025 11.3 (H)  8.8 - 10.0 mg/dL Final    Protein Total 01/13/2025 7.5  5.8 - 7.6 gm/dL Final    Albumin 01/13/2025 3.0 (L)  3.4 - 4.8 g/dL Final    Globulin 01/13/2025 4.5 (H)  2.4 - 3.5 gm/dL Final    Albumin/Globulin Ratio 01/13/2025 0.7 (L)  1.1 - 2.0 ratio Final    Bilirubin Total 01/13/2025 0.8  <=1.5 mg/dL Final    ALP 01/13/2025 77  40 - 150 unit/L Final    ALT 01/13/2025 54  0 - 55 unit/L Final    AST 01/13/2025 45 (H)  5 - 34 unit/L Final    eGFR 01/13/2025 >60  mL/min/1.73/m2 Final    Anion Gap 01/13/2025 13.0  mEq/L Final    BUN/Creatinine Ratio 01/13/2025 23   Final    WBC 01/13/2025 11.28  4.50 - 11.50 x10(3)/mcL Final    RBC 01/13/2025 5.46  4.70 - 6.10 x10(6)/mcL Final    Hgb 01/13/2025 14.1  14.0 - 18.0 g/dL Final    Hct 01/13/2025 44.1  42.0 - 52.0 % Final    MCV 01/13/2025 80.8  80.0 - 94.0 fL Final    MCH 01/13/2025 25.8 (L)  27.0 - 31.0 pg Final    MCHC 01/13/2025 32.0 (L)  33.0 - 36.0 g/dL Final    RDW 01/13/2025 18.8 (H)  11.5 - 17.0 % Final    Platelet 01/13/2025 293  130 - 400 x10(3)/mcL Final    MPV 01/13/2025 8.7  7.4 - 10.4 fL Final    Neut % 01/13/2025 79.5  % Final    Lymph % 01/13/2025 12.8  % Final    Mono % 01/13/2025 6.6  % Final    Eos % 01/13/2025 0.5  % Final    Basophil % 01/13/2025 0.2  % Final    Imm Grans % 01/13/2025 0.4  % Final    Neut # 01/13/2025 8.97  2.1 - 9.2 x10(3)/mcL Final    Lymph # 01/13/2025 1.44  0.6 - 4.6 x10(3)/mcL Final    Mono # 01/13/2025 0.74  0.1 - 1.3 x10(3)/mcL Final    Eos # 01/13/2025 0.06  0 - 0.9 x10(3)/mcL Final     Baso # 01/13/2025 0.02  <=0.2 x10(3)/mcL Final    Imm Gran # 01/13/2025 0.05 (H)  0.00 - 0.04 x10(3)/mcL Final    NRBC% 01/13/2025 0.0  % Final    Neutrophils % BF 01/13/2025 98  % Final    Monocyte % BF 01/13/2025 2  % Final    Bacteria, UA 01/13/2025 Many (A)  None Seen, Rare, Occasional /HPF Final    RBC, UA 01/13/2025 3-5  None Seen, 0-2, 3-5, 0-5 /HPF Final    WBC, UA 01/13/2025 >=100 (A)  None Seen, 0-2, 3-5, 0-5 /HPF Final    Squamous Epithelial Cells, UA 01/13/2025 Rare  None Seen, Rare, Occasional, Occ /HPF Final    Lactic Acid Level 01/13/2025 0.9  0.5 - 2.2 mmol/L Final    Ok to use blood in lab        X-Ray Chest 1 View    Result Date: 1/13/2025  EXAMINATION: XR CHEST 1 VIEW CLINICAL HISTORY: pre-op;, . COMPARISON: November 19, 2024 FINDINGS: No alveolar consolidation, effusion, or pneumothorax is seen.   The thoracic aorta is normal  cardiac silhouette, central pulmonary vessels and mediastinum are normal in size and are grossly unremarkable.   visualized osseous structures are grossly unremarkable.. Hiatus hernia is identified     No acute chest disease is identified.. Hiatus hernia Electronically signed by: Josué Lowery Date:    01/13/2025 Time:    16:12    X-Ray Knee Complete 4 or More Views Left    Result Date: 1/13/2025  See Provider Notes for results. IMPRESSION: Please see provider office/clinic notes for radiology interpretation This procedure was auto-finalized by: Virtual Radiologist       Microbiology Results (last 7 days)       Procedure Component Value Units Date/Time    Urine culture [[id number]] Collected: 01/13/25 1618    Order Status: Sent Specimen: Urine Updated: 01/13/25 1642    Gram Stain [[id number]] Collected: 01/13/25 1537    Order Status: Sent Specimen: Body Fluid Updated: 01/13/25 1537             ASSESSMENT & PLAN:   Left knee pain/infection  Failed Left knee arthroscopy  Epigastric abdominal pain  Uti   Hx htn,hld,hypothyroid    Plan    Refer to ortho recs sx  tomorrow  Blood/urine cx, vanc/cefepime  Pain control  Labs/lipase/kub  Protonix bid  Labs in am     Dvt proph: scd         __________________________________________________________________________  INPATIENT LIST OF MEDICATIONS     Current Facility-Administered Medications:     [START ON 1/14/2025] 0.9% NaCl infusion, , Intravenous, Continuous, Tracy, Sravanthi L, FNP, Last Rate: 75 mL/hr at 01/13/25 1742, New Bag at 01/13/25 1742    gabapentin capsule 300 mg, 300 mg, Oral, QHS, Tracy, Sravanthi L, FNP    [START ON 1/14/2025] levothyroxine tablet 50 mcg, 50 mcg, Oral, Daily, Tracy, Sravanthi L, FNP    methocarbamoL tablet 750 mg, 750 mg, Oral, Q8H PRN, Tracy, Sravanthi L, FNP    morphine injection 4 mg, 4 mg, Intravenous, Q3H PRN, Tracy, Sravanthi L, FNP    ondansetron 4 mg/5 mL solution 4 mg, 4 mg, Oral, Q6H PRN, Tracy, Sravanthi L, FNP    oxyCODONE-acetaminophen 5-325 mg per tablet 1 tablet, 1 tablet, Oral, Q4H PRN, Tracy, Sravanthi L, FNP, 1 tablet at 01/13/25 1735    oxyCODONE-acetaminophen 7.5-325 mg per tablet 1 tablet, 1 tablet, Oral, Q4H PRN, Tracy, Sravanthi L, FNP    piperacillin-tazobactam (ZOSYN) 4.5 g in D5W 100 mL IVPB (MB+), 4.5 g, Intravenous, Q8H, Tracy, Sravanthi L, FNP, Last Rate: 25 mL/hr at 01/13/25 1744, 4.5 g at 01/13/25 1744    [START ON 1/14/2025] polyethylene glycol packet 17 g, 17 g, Oral, Daily, Tracy, Sravanthi L, FNP    senna-docusate 8.6-50 mg per tablet 2 tablet, 2 tablet, Oral, BID, Sravanthi Molina L, FNP    valsartan tablet 320 mg, 320 mg, Oral, Daily, Tracy, Sravanthi L, FNP    vancomycin (VANCOCIN) 1,000 mg in 0.9% NaCl 250 mL IVPB (admixture device), 1,000 mg, Intravenous, Once **FOLLOWED BY** vancomycin (VANCOCIN) 1,000 mg in 0.9% NaCl 250 mL IVPB (admixture device), 1,000 mg, Intravenous, Once, Pablito Abad Jr., MD    vancomycin - pharmacy to dose, , Intravenous, pharmacy to manage frequency, Pablito Abad Jr., MD      Scheduled Meds:   gabapentin  300 mg Oral QHS    [START ON 1/14/2025] levothyroxine  50  mcg Oral Daily    piperacillin-tazobactam (Zosyn) IV (PEDS and ADULTS) (extended infusion is not appropriate)  4.5 g Intravenous Q8H    [START ON 1/14/2025] polyethylene glycol  17 g Oral Daily    senna-docusate 8.6-50 mg  2 tablet Oral BID    valsartan  320 mg Oral Daily    vancomycin (VANCOCIN) IV (PEDS and ADULTS)  1,000 mg Intravenous Once    Followed by    vancomycin (VANCOCIN) IV (PEDS and ADULTS)  1,000 mg Intravenous Once     Continuous Infusions:   [START ON 1/14/2025] 0.9% NaCl   Intravenous Continuous 75 mL/hr at 01/13/25 1742 New Bag at 01/13/25 1742     PRN Meds:.  Current Facility-Administered Medications:     methocarbamoL, 750 mg, Oral, Q8H PRN    morphine, 4 mg, Intravenous, Q3H PRN    ondansetron, 4 mg, Oral, Q6H PRN    oxyCODONE-acetaminophen, 1 tablet, Oral, Q4H PRN    oxyCODONE-acetaminophen, 1 tablet, Oral, Q4H PRN    vancomycin - pharmacy to dose, , Intravenous, pharmacy to manage frequency    ______________________________________________________________________________    Thank you for the consult, will be happy to follow alongside you      All diagnosis and differential diagnosis have been reviewed; assessment and plan has been documented; I have personally reviewed the labs and test results that are presently available; I have reviewed the patients medication list; I have reviewed the consulting providers response and recommendations. I have reviewed or attempted to review medical records based upon their availability.    All of the patient and family questions have been addressed and answered. Patient's is agreeable to the above stated plan. I will continue to monitor closely and make adjustments to medical management as needed.    Neno Castellon MD   01/13/2025

## 2025-01-14 NOTE — PT/OT/SLP EVAL
Physical Therapy Evaluation    Patient Name:  Zachery Cordero   MRN:  [id number]    Recommendations:     Discharge Recommendations: Low Intensity Therapy   Discharge Equipment Recommendations: walker, rolling   Barriers to discharge:  LE numbness    Assessment:     Zachery Cordero is a 64 y.o. male admitted with a medical diagnosis of <principal problem not specified>.  He presents with the following impairments/functional limitations: weakness, impaired endurance, impaired functional mobility, decreased lower extremity function, pain, decreased ROM, edema, orthopedic precautions . Verbal orders for PT per SEBLE Goins.     Rehab Prognosis: Fair; patient would benefit from acute skilled PT services to address these deficits and reach maximum level of function.    Recent Surgery: Procedure(s) (LRB):  IRRIGATION AND DEBRIDEMENT, LOWER EXTREMITY (Left)  REVISION, ARTHROPLASTY, KNEE - poly (Left) Day of Surgery    Plan:     During this hospitalization, patient to be seen BID to address the identified rehab impairments via gait training, therapeutic activities, therapeutic exercises and progress toward the following goals:    Plan of Care Expires:  01/20/25    Subjective     Chief Complaint: LE numbness  Patient/Family Comments/goals:   Pain/Comfort:  Location - Side 1: Right  Location 1: knee  Pain Addressed 1: Pre-medicate for activity, Reposition    Patients cultural, spiritual, [employer] conflicts given the current situation:      Living Environment:  Pt lives in single story home with wife, only small threshold to enter  Prior to admission, patients level of function was ind.  Equipment used at home: none.  DME owned (not currently used): rolling walker.  Upon discharge, patient will have assistance from wife.    Objective:     Communicated with nurse prior to session.  Patient found supine with peripheral IV, telemetry  upon PT entry to room.    General Precautions: Standard, fall  Orthopedic Precautions:RLE weight  bearing as tolerated   Braces: N/A  Respiratory Status: Room air    Exams:  RLE ROM: WFL  RLE Strength: WFL  LLE ROM: NT dt numbness  LLE Strength: NT dt numbness    Functional Mobility:  Bed Mobility:     Supine to Sit: contact guard assistance; pt unable to cont further mobility due to numbness in LE            Treatment & Education:  Pt edu on importance of frequent mobility    Patient left supine with all lines intact, call button in reach, and nurse notified.    GOALS:   Multidisciplinary Problems       Physical Therapy Goals       Not on file                    History:     Past Medical History:   Diagnosis Date    Arthritis     Bladder stone     BPH (benign prostatic hyperplasia)     Does use hearing aid     Dry eye     ED (erectile dysfunction)     Enlarged prostate with urinary obstruction     Hearing loss     wears hearing aids    HLD (hyperlipidemia)     HTN (hypertension)     Hypothyroidism, unspecified     Nocturia     Obesity     Personal history of colonic polyps 06/20/2018    Colonoscopy Report    Unspecified sensorineural hearing loss     Urinary catheter in place     Uses walker        Past Surgical History:   Procedure Laterality Date    CATARACT EXTRACTION W/  INTRAOCULAR LENS IMPLANT Right     COLONOSCOPY  06/20/2018    COLONOSCOPY W/ POLYPECTOMY  06/27/2023    KNEE ARTHROSCOPY W/ MENISCAL REPAIR Right     MOUTH SURGERY      salivary gland    ROBOT-ASSISTED LAPAROSCOPIC SIMPLE PROSTATECTOMY USING DA LOUIE XI N/A 12/31/2024    Procedure: XI ROBOTIC PROSTATECTOMY, SIMPLE;  Surgeon: Kieran Townsend MD;  Location: [facility];  Service: Urology;  Laterality: N/A;  PLUS REMOVE BLADDER STONE    ROBOTIC ARTHROPLASTY, KNEE Left 06/11/2024    Procedure: SDD- ROBOTIC ARTHROPLASTY, KNEE, TOTAL;  Surgeon: Pablito Abad Jr., MD;  Location: [facility] OR;  Service: Orthopedics;  Laterality: Left;    ROBOTIC ARTHROPLASTY, KNEE Right 12/10/2024    Procedure: SDD - ROBOTIC ARTHROPLASTY, KNEE, TOTAL;  Surgeon:  Pablito Abad Jr., MD;  Location: [facility];  Service: Orthopedics;  Laterality: Right;       Time Tracking:     PT Received On:    PT Start Time: 1721     PT Stop Time: 1733  PT Total Time (min): 12 min     Billable Minutes: Evaluation 12 01/14/2025

## 2025-01-14 NOTE — PROGRESS NOTES
[facility] ORTHOPAEDIC  [facility] Medicine Progress Note      Patient Name: Zachery Cordero  MRN: [id number]  Admission Date: 1/13/2025   Length of Stay: 1  Attending Physician: Neno Castellon MD  Primary Care Provider: Josué Bass MD  Face-to-Face encounter date: 01/14/2025    Code Status: Not on file        Chief Complaint:   Left knee swelling        HPI:   Zachery Cordero is a 64 y.o. male with  has a past medical history of Arthritis, Bladder stone, BPH (benign prostatic hyperplasia), Does use hearing aid, Dry eye, ED (erectile dysfunction), Enlarged prostate with urinary obstruction, Hearing loss, HLD (hyperlipidemia), HTN (hypertension), Hypothyroidism, unspecified, Nocturia, Obesity, Personal history of colonic polyps (06/20/2018), Unspecified sensorineural hearing loss, Urinary catheter in place, and Uses walker..admitted under Dr. Abad on 1/13/2025 with the diagnosis of left knee swelling,pain x7 days, hx of left TKA 6/11/24 and right knee arthroplasty 12/10/24.  Had prostatectomy 12/31/24.  +low grade fever, no cp/sob/n/v/d.  Pt has had 2 wk hx of epigastric abd pain consistently and daily after eating 4 bites of food, last 3hours.  Hospital Medicine team was consulted for medical management     Overview/Hospital Course:  No notes on file       Interval Hx:   Feels good, says abd pain better.  No f/c    Review of Systems   All other systems reviewed and are negative.      Objective/physical exam:  General: In no acute distress, afebrile  Chest: Clear to auscultation bilaterally  Heart: tachycardia,RR, +S1, S2, no appreciable murmur  Abdomen: Soft, nontender, BS +  MSK: left knee swelling/warmth  Neurologic: Alert and oriented x4, Cranial nerve II-XII intact     VITAL SIGNS: 24 HRS MIN & MAX LAST   Temp  Min: 97.8 °F (36.6 °C)  Max: 100.9 °F (38.3 °C) 97.8 °F (36.6 °C)   BP  Min: 126/87  Max: 148/87 (!) 134/90   Pulse  Min: 80  Max: 110  93   Resp  Min: 16  Max:  20 20   SpO2  Min: 94 %  Max: 98 % 96 %       Recent Labs   Lab 01/13/25  1607 01/14/25  0411   WBC 11.28 9.99   RBC 5.46 4.97   HGB 14.1 12.9*   HCT 44.1 40.3*   MCV 80.8 81.1   MCH 25.8* 26.0*   MCHC 32.0* 32.0*   RDW 18.8* 18.3*    264   MPV 8.7 8.5       Recent Labs   Lab 01/13/25  1607 01/14/25  0411    138   K 4.3 4.2    105   CO2 23 23   BUN 22.5 20.8   CREATININE 0.98 0.90   CALCIUM 11.3* 10.2*   ALBUMIN 3.0* 2.6*   ALKPHOS 77 69   ALT 54 56*   AST 45* 43*   BILITOT 0.8 0.7        Microbiology Results (last 7 days)       Procedure Component Value Units Date/Time    Urine culture [[id number]]  (Abnormal) Collected: 01/13/25 1618    Order Status: Completed Specimen: Urine Updated: 01/14/25 0725     Urine Culture >/= 100,000 colonies/ml Gram-negative Rods    Blood Culture [[id number]] Collected: 01/13/25 2033    Order Status: Resulted Specimen: Blood from Antecubital, Left Updated: 01/13/25 2034    Blood Culture [[id number]] Collected: 01/13/25 2033    Order Status: Resulted Specimen: Blood from Hand, Right Updated: 01/13/25 2034    Gram Stain [[id number]] Collected: 01/13/25 1537    Order Status: Completed Specimen: Body Fluid Updated: 01/13/25 1822     GRAM STAIN Many WBC observed      No bacteria seen             Radiology:  X-Ray Abdomen AP 1 View  Narrative: EXAMINATION:  XR ABDOMEN AP 1 VIEW    CLINICAL HISTORY:  epigastric abd pain;    TECHNIQUE:  One view    COMPARISON:  None available    FINDINGS:  There is colonic fecal loading throughout.  Bowel gas pattern is nonspecific and nonobstructive.  Solid organs are obscured.  There appears a hiatal hernia.  Impression: Nonspecific bowel gas pattern.    Electronically signed by: Mukul Angel  Date:    01/13/2025  Time:    19:59  X-Ray Chest 1 View  Narrative: EXAMINATION:  XR CHEST 1 VIEW    CLINICAL HISTORY:  pre-op;, .    COMPARISON:  November 19, 2024    FINDINGS:  No alveolar consolidation, effusion, or pneumothorax is seen.   The  thoracic aorta is normal  cardiac silhouette, central pulmonary vessels and mediastinum are normal in size and are grossly unremarkable.   visualized osseous structures are grossly unremarkable..    Hiatus hernia is identified  Impression: No acute chest disease is identified..    Hiatus hernia    Electronically signed by: Josué Lowery  Date:    01/13/2025  Time:    16:12  X-Ray Knee Complete 4 or More Views Left  See Provider Notes for results.     IMPRESSION: Please see provider office/clinic notes for radiology   interpretation    This procedure was auto-finalized by: Virtual Radiologist      Scheduled Med:   ceFEPime IV (PEDS and ADULTS)  2 g Intravenous Q8H    gabapentin  300 mg Oral QHS    gentamicin        levothyroxine  50 mcg Oral Daily    pantoprazole  40 mg Intravenous BID    polyethylene glycol  17 g Oral Daily    senna-docusate 8.6-50 mg  2 tablet Oral BID    tranexamic acid  1,950 mg Oral Once Pre-Op    valsartan  320 mg Oral QHS    vancomycin        vancomycin 1,500 mg in D5W 250 mL IVPB (admixture device)  1,500 mg Intravenous Q12H        Continuous Infusions:   0.9% NaCl   Intravenous Continuous 75 mL/hr at 01/14/25 0529 New Bag at 01/14/25 0529        PRN Meds:    Current Facility-Administered Medications:     acetaminophen, 1,000 mg, Oral, On Call Procedure    acetaminophen, 325 mg, Oral, Q4H PRN    ceFAZolin (Ancef) IV (PEDS and ADULTS), 2 g, Intravenous, On Call Procedure    gabapentin, 300 mg, Oral, On Call Procedure    gentamicin, , ,     hydrALAZINE, 10 mg, Intravenous, Q3H PRN    ketorolac, 10 mg, Oral, On Call Procedure    labetalol, 10 mg, Intravenous, Q4H PRN    methocarbamoL, 750 mg, Oral, Q8H PRN    morphine, 4 mg, Intravenous, Q3H PRN    ondansetron, 4 mg, Oral, Q6H PRN    oxyCODONE-acetaminophen, 1 tablet, Oral, Q4H PRN    oxyCODONE-acetaminophen, 1 tablet, Oral, Q4H PRN    traZODone, 50 mg, Oral, Nightly PRN    vancomycin, , ,     vancomycin - pharmacy to dose, , Intravenous,  pharmacy to manage frequency     Nutrition Status:      Assessment/Plan:  Left knee pain/infection  Failed Left knee arthroscopy  Epigastric abdominal pain  Uti   Hx htn,hld,hypothyroid     Plan     Refer to ortho recs sx today  Knee arthrocentesis g-rods  Blood cx pending/urine cx g-rods, dc vanc/cont cefepime  Pain control  Labs/kub reviewed  Cont trial of Protonix bid, f/u with pcp for further recs  Labs in am            All diagnosis and differential diagnosis have been reviewed; assessment and plan has been documented; I have personally reviewed the labs and test results that are presently available; I have reviewed the patients medication list; I have reviewed the consulting providers response and recommendations. I have reviewed or attempted to review medical records based upon their availability      _____________________________________________________________________            Neno Castellon MD   01/14/2025

## 2025-01-15 PROCEDURE — 27000207 HC ISOLATION

## 2025-01-15 PROCEDURE — 97116 GAIT TRAINING THERAPY: CPT | Mod: CQ

## 2025-01-15 PROCEDURE — 11000001 HC ACUTE MED/SURG PRIVATE ROOM

## 2025-01-15 PROCEDURE — 99900031 HC PATIENT EDUCATION (STAT)

## 2025-01-15 PROCEDURE — 97110 THERAPEUTIC EXERCISES: CPT | Mod: CQ

## 2025-01-15 PROCEDURE — 25000003 PHARM REV CODE 250: Performed by: NURSE PRACTITIONER

## 2025-01-15 PROCEDURE — 94761 N-INVAS EAR/PLS OXIMETRY MLT: CPT

## 2025-01-15 PROCEDURE — 99900035 HC TECH TIME PER 15 MIN (STAT)

## 2025-01-15 PROCEDURE — 99232 SBSQ HOSP IP/OBS MODERATE 35: CPT | Mod: ,,,

## 2025-01-15 PROCEDURE — 25000003 PHARM REV CODE 250: Performed by: ORTHOPAEDIC SURGERY

## 2025-01-15 PROCEDURE — 63600175 PHARM REV CODE 636 W HCPCS: Performed by: INTERNAL MEDICINE

## 2025-01-15 PROCEDURE — 97530 THERAPEUTIC ACTIVITIES: CPT | Mod: CQ

## 2025-01-15 PROCEDURE — A4216 STERILE WATER/SALINE, 10 ML: HCPCS | Performed by: ORTHOPAEDIC SURGERY

## 2025-01-15 PROCEDURE — 94799 UNLISTED PULMONARY SVC/PX: CPT

## 2025-01-15 RX ORDER — SODIUM CHLORIDE 0.9 % (FLUSH) 0.9 %
10 SYRINGE (ML) INJECTION EVERY 6 HOURS
Status: DISCONTINUED | OUTPATIENT
Start: 2025-01-16 | End: 2025-01-17 | Stop reason: HOSPADM

## 2025-01-15 RX ADMIN — GABAPENTIN 300 MG: 300 CAPSULE ORAL at 09:01

## 2025-01-15 RX ADMIN — PANTOPRAZOLE SODIUM 40 MG: 40 INJECTION, POWDER, LYOPHILIZED, FOR SOLUTION INTRAVENOUS at 09:01

## 2025-01-15 RX ADMIN — OXYCODONE AND ACETAMINOPHEN 1 TABLET: 7.5; 325 TABLET ORAL at 01:01

## 2025-01-15 RX ADMIN — LEVOTHYROXINE SODIUM 50 MCG: 0.05 TABLET ORAL at 08:01

## 2025-01-15 RX ADMIN — OXYCODONE AND ACETAMINOPHEN 1 TABLET: 7.5; 325 TABLET ORAL at 06:01

## 2025-01-15 RX ADMIN — METHOCARBAMOL 750 MG: 750 TABLET ORAL at 09:01

## 2025-01-15 RX ADMIN — POLYETHYLENE GLYCOL 3350 17 G: 17 POWDER, FOR SOLUTION ORAL at 08:01

## 2025-01-15 RX ADMIN — VALSARTAN 320 MG: 160 TABLET, FILM COATED ORAL at 09:01

## 2025-01-15 RX ADMIN — SENNOSIDES AND DOCUSATE SODIUM 2 TABLET: 50; 8.6 TABLET ORAL at 08:01

## 2025-01-15 RX ADMIN — Medication 10 ML: at 11:01

## 2025-01-15 RX ADMIN — OXYCODONE AND ACETAMINOPHEN 1 TABLET: 7.5; 325 TABLET ORAL at 11:01

## 2025-01-15 RX ADMIN — OXYCODONE AND ACETAMINOPHEN 1 TABLET: 7.5; 325 TABLET ORAL at 08:01

## 2025-01-15 RX ADMIN — CEFEPIME 2 G: 2 INJECTION, POWDER, FOR SOLUTION INTRAVENOUS at 03:01

## 2025-01-15 RX ADMIN — CEFEPIME 2 G: 2 INJECTION, POWDER, FOR SOLUTION INTRAVENOUS at 12:01

## 2025-01-15 RX ADMIN — OXYCODONE AND ACETAMINOPHEN 1 TABLET: 7.5; 325 TABLET ORAL at 12:01

## 2025-01-15 RX ADMIN — OXYCODONE AND ACETAMINOPHEN 1 TABLET: 7.5; 325 TABLET ORAL at 04:01

## 2025-01-15 RX ADMIN — SENNOSIDES AND DOCUSATE SODIUM 2 TABLET: 50; 8.6 TABLET ORAL at 09:01

## 2025-01-15 RX ADMIN — CEFEPIME 2 G: 2 INJECTION, POWDER, FOR SOLUTION INTRAVENOUS at 06:01

## 2025-01-15 NOTE — PLAN OF CARE
Problem: Physical Therapy  Goal: Physical Therapy Goal  Description: Pt will improve functional independence by performing:    Bed mobility: SBA  Sit to stand: SBA with rolling walker - MET  Bed to chair: SBA with Stand Step  with rolling walker   Car Transfer: SBA with rolling walker  Ambulation x 200'  feet with SBA and rolling walker MET  1 Step (Curb): Min A  and rolling walker MET  3 Steps: Min A  and B HR MET  left knee AROM flexion (in degrees): 90  left knee AROM extension (in degrees): 0   Independent with total knee HEP     1/15/2025 1524 by Dawna Brasher, WIL  Outcome: Progressing

## 2025-01-15 NOTE — PLAN OF CARE
01/15/25 1129   Discharge Assessment   Assessment Type Discharge Planning Assessment   Source of Information patient   Communicated SCAR with patient/caregiver Yes   Reason For Admission S/P periprosthetic knee infection   People in Home spouse   Do you expect to return to your current living situation? Yes   Do you have help at home or someone to help you manage your care at home? Yes   Who are your caregiver(s) and their phone number(s)? [place] & possible sitter.   Prior to hospitilization cognitive status: Alert/Oriented   Current cognitive status: Alert/Oriented   Walking or Climbing Stairs Difficulty yes   Walking or Climbing Stairs ambulation difficulty, requires equipment;ambulation difficulty, assistance 1 person   Dressing/Bathing Difficulty no   Do you have any problems with: Errands/Grocery;Needs other help   Equipment Currently Used at Home walker, rolling   Readmission within 30 days? No   Patient currently being followed by outpatient case management? No   Do you currently have service(s) that help you manage your care at home? No   Do you take prescription medications? Yes   Do you have prescription coverage? Yes   Do you have any problems affording any of your prescribed medications? No   Is the patient taking medications as prescribed? yes   Who is going to help you get home at discharge? son   How do you get to doctors appointments? car, drives self   Are you on dialysis? No   Do you take coumadin? No   Discharge Plan A Home with family;Home Health   Discharge Plan B Home with family;Home Health   DME Needed Upon Discharge  medication pump;walker, rolling   Discharge Plan discussed with: Patient   Transition of Care Barriers Mobility     Admitted w periprosthetic knee infection. Spk w pt -- std he lives with wife- Elsi Adler ( hx early dementia)- will be minimal asst w homecare. Boyd Pappas has short leave from work & will be staying with pt for next 2 weeks. Mian will asst w homecare. Pt also  has long term insurance policy & will explore sitter asst for additional asst during recovery. Various brochures given to pt re: sitter agencies. Pt has RW. Discuss dcp options. Provider list given. Foc obtained.     Faxed referral for infusion to [facility]. Await I.D. recommendations.   Faxed referral for hh to [facility]. Plan dc 1-2 days.     Contact # [phone]

## 2025-01-15 NOTE — PT/OT/SLP PROGRESS
Physical Therapy Treatment    Patient Name:  Zachery Cordero   MRN:  [id number]    Recommendations:     Discharge Recommendations: Low Intensity Therapy  Discharge Equipment Recommendations: walker, rolling  Barriers to discharge:  impaired functional mobility, medical    Assessment:     Zachery Cordero is a 64 y.o. male admitted with a medical diagnosis of <principal problem not specified>.  He presents with the following impairments/functional limitations: weakness, impaired endurance, impaired functional mobility, decreased lower extremity function, pain, decreased ROM, edema, orthopedic precautions .    Rehab Prognosis: Good; patient would benefit from acute skilled PT services to address these deficits and reach maximum level of function.    Recent Surgery: Procedure(s) (LRB):  IRRIGATION AND DEBRIDEMENT, LOWER EXTREMITY (Left)  REVISION, ARTHROPLASTY, KNEE - poly (Left) 1 Day Post-Op    Plan:     During this hospitalization, patient to be seen BID to address the identified rehab impairments via gait training, therapeutic activities, therapeutic exercises and progress toward the following goals:    Plan of Care Expires:  01/20/25    Subjective     Chief Complaint: left knee pain  Patient/Family Comments/goals: discharge Friday   Pain/Comfort:  Pain Rating 1: 7/10  Location - Side 1: Left  Location - Orientation 1: generalized  Location 1: knee  Pain Addressed 1: Pre-medicate for activity, Reposition, Distraction  Pain Rating Post-Intervention 1: 9/10      Objective:     Communicated with nsg prior to session.  Patient found HOB elevated with   upon PT entry to room.     General Precautions: Standard, fall  Orthopedic Precautions: RLE weight bearing as tolerated  Braces: N/A  Respiratory Status: Room air     Functional Mobility:  Bed Mobility:     Supine to Sit: minimum assistance - for LLE - increased time required  Transfers:     Sit to Stand:  stand by assistance with rolling walker  Toilet Transfer: stand by  assistance with  rolling walker  using  Step Transfer - increased time required  Gait: stand-by with RW, pt ambulated 125' with step-through gait, slow speed, and limited left knee flexion; pt required vc to flex knee    Treatment & Education:  Pt educated on pursed lip breathing.  Pt educated on importance of out of bed mobility and frequent ambulation.  Pt educated on fall prevention and safety at home.  Pt educated on manangement of swelling through RICE method.  Pt performed 1 set of total knee HEPs with active assist of Left knee.      (In degrees) AROM PROM   L knee flexion 56 63   L knee extension 3 1        Patient left up in chair with all lines intact and call button in reach..    GOALS:   Multidisciplinary Problems       Physical Therapy Goals          Problem: Physical Therapy    Goal Priority Disciplines Outcome Interventions   Physical Therapy Goal     PT, PT/OT Progressing    Description: Pt will improve functional independence by performing:    Bed mobility: SBA  Sit to stand: SBA with rolling walker - MET  Bed to chair: SBA with Stand Step  with rolling walker   Car Transfer: SBA with rolling walker  Ambulation x 200'  feet with SBA and rolling walker  1 Step (Curb): Min A  and rolling walker  3 Steps: Min A  and B HR  left knee AROM flexion (in degrees): 90  left knee AROM extension (in degrees): 0   Independent with total knee HEP                          Time Tracking:     PT Received On:    PT Start Time: 0843     PT Stop Time: 0927  PT Total Time (min): 44 min     Billable Minutes: Gait Training 14 min, Therapeutic Activity 15 min, and Therapeutic Exercise 16 min    Treatment Type: Treatment  PT/PTA: PTA     Number of PTA visits since last PT visit: 1     01/15/2025

## 2025-01-15 NOTE — PROGRESS NOTES
".[facility] - Orthopaedics  Orthopedics  Progress Note    Patient Name: Zachery Cordero  MRN: [id number]  Admission Date: 1/13/2025  Hospital Length of Stay: 2 days  Attending Provider: Pablito Abad Jr., MD  Primary Care Provider: Josué Bass MD  Follow-up For: Procedure(s) (LRB):  IRRIGATION AND DEBRIDEMENT, LOWER EXTREMITY (Left)  REVISION, ARTHROPLASTY, KNEE - poly (Left)    Post-Operative Day: 1 Day Post-Op  Subjective:     Principal Problem:<principal problem not specified>    Principal Orthopedic Problem: 1 Day Post-Op     Interval History: Resting in chair    Review of patient's allergies indicates:  No Known Allergies    Current Facility-Administered Medications   Medication    0.9% NaCl infusion    acetaminophen tablet 1,000 mg    acetaminophen tablet 325 mg    ceFAZolin 2 g    ceFEPIme injection 2 g    gabapentin capsule 300 mg    gabapentin capsule 300 mg    hydrALAZINE injection 10 mg    HYDROmorphone (PF) injection 0.4 mg    labetaloL injection 10 mg    levothyroxine tablet 50 mcg    methocarbamoL tablet 750 mg    morphine injection 4 mg    ondansetron 4 mg/5 mL solution 4 mg    oxyCODONE-acetaminophen 5-325 mg per tablet 1 tablet    oxyCODONE-acetaminophen 7.5-325 mg per tablet 1 tablet    pantoprazole injection 40 mg    polyethylene glycol packet 17 g    senna-docusate 8.6-50 mg per tablet 2 tablet    sodium chloride 0.9% flush 10 mL    traZODone tablet 50 mg    valsartan tablet 320 mg     Objective:     Vital Signs (Most Recent):  Temp: 99.4 °F (37.4 °C) (01/15/25 1134)  Pulse: 102 (01/15/25 1134)  Resp: 18 (01/15/25 0835)  BP: 139/82 (01/15/25 1134)  SpO2: 96 % (01/15/25 1134) Vital Signs (24h Range):  Temp:  [96.8 °F (36 °C)-99.4 °F (37.4 °C)] 99.4 °F (37.4 °C)  Pulse:  [] 102  Resp:  [18] 18  SpO2:  [93 %-99 %] 96 %  BP: (122-163)/(74-99) 139/82     Weight: 108.3 kg (238 lb 12.1 oz)  Height: 6' 4" (193 cm)  Body mass index is 29.06 kg/m².      Intake/Output Summary " (Last 24 hours) at 1/15/2025 1255  Last data filed at 1/15/2025 0520  Gross per 24 hour   Intake 2680 ml   Output 1710 ml   Net 970 ml       Physical Exam:   Musculoskeletal:     Left lower extremity: + swelling;  compartments are soft and compressible; Dressing c/d/i; dorsi/plantar flexes the foot; SILT distally; BCR distally; DP pulse 2+      Diagnostic Findings:   Significant Labs:   Recent Lab Results  (Last 5 results in the past 72 hours)        01/14/25  1224   01/14/25  0411   01/13/25  2033   01/13/25  1741   01/13/25  1700        Albumin/Globulin Ratio   0.7             Aerobic Culture - Tissue No Growth At 24 Hours  [P]               Albumin   2.6             ALP   69             ALT   56             Anion Gap   10.0             AST   43             Baso #   0.04             Basophil %   0.4             BILIRUBIN TOTAL   0.7             BLOOD CULTURE     No Growth At 24 Hours  [P]                No Growth At 24 Hours  [P]           BUN   20.8             BUN/CREAT RATIO   23             Calcium   10.2             Chloride   105             CO2   23             Creatinine   0.90             CRP, High Sensitivity     250.14           eGFR   >60             Eos #   0.20             Eos %   2.0             Globulin, Total   3.8             Glucose   90             GRAM STAIN Few WBC observed                No bacteria seen               Hematocrit   40.3             Hemoglobin   12.9             Immature Grans (Abs)   0.03             Immature Granulocytes   0.3             Lactic Acid Level       0.9  Comment: Ok to use blood in lab         Lipase     23           Lymph #   1.50             LYMPH %   15.0             MCH   26.0             MCHC   32.0             MCV   81.1             Mono #   0.68             Mono %   6.8             MPV   8.5             Neut #   7.54             Neut %   75.5             nRBC   0.0             QRS Duration         128       O[facility] QTC Calculation         450       Platelet  Count   264             Potassium   4.2             PROTEIN TOTAL   6.4             RBC   4.97             RDW   18.3             Sed Rate     102           Sodium   138             WBC   9.99                                     [P] - Preliminary Result                  Assessment/Plan:     There are no hospital problems to display for this patient.        PLAN:   01/14/2025: Left knee irrigation debridement, single stage revision with polyethylene swap    Weightbear as tolerated left lower extremity   PT OT   continue IV antibiotics   ID consult for prolonged antibiotic treatment.

## 2025-01-15 NOTE — PT/OT/SLP PROGRESS
Physical Therapy Treatment    Patient Name:  Zachery Cordero   MRN:  [id number]    Recommendations:     Discharge Recommendations: Low Intensity Therapy  Discharge Equipment Recommendations: walker, rolling  Barriers to discharge:  medical    Assessment:     Zachery Cordero is a 64 y.o. male admitted with a medical diagnosis of <principal problem not specified>.  He presents with the following impairments/functional limitations: weakness, impaired endurance, impaired functional mobility, decreased lower extremity function, pain, decreased ROM, edema, orthopedic precautions .    Rehab Prognosis: Good; patient would benefit from acute skilled PT services to address these deficits and reach maximum level of function.    Recent Surgery: Procedure(s) (LRB):  IRRIGATION AND DEBRIDEMENT, LOWER EXTREMITY (Left)  REVISION, ARTHROPLASTY, KNEE - poly (Left) 1 Day Post-Op    Plan:     During this hospitalization, patient to be seen BID to address the identified rehab impairments via gait training, therapeutic activities, therapeutic exercises and progress toward the following goals:    Plan of Care Expires:  01/20/25    Subjective     Chief Complaint: left knee pain and occasional leg spasms  Patient/Family Comments/goals: discharge home Friday  Pain/Comfort:  Pain Rating 1: 3/10  Location - Side 1: Left  Location - Orientation 1: generalized  Location 1: knee  Pain Addressed 1: Pre-medicate for activity, Reposition, Distraction  Pain Rating Post-Intervention 1: 8/10      Objective:     Communicated with nsg prior to session.  Patient found up in chair with   upon PT entry to room.     General Precautions: Standard, fall  Orthopedic Precautions: RLE weight bearing as tolerated  Braces: N/A  Respiratory Status: Room air     Functional Mobility:  Transfers:     Sit to Stand:  stand by assistance with rolling walker  Toilet Transfer: stand by assistance with  rolling walker  using  Step Transfer - positive void  Gait: stand by  "assistance with Rw, pt ambualted 150' and 200' with step-to, then step-through gait pattern; with decreased left knee flexion; vc for increased left knee flexion  Stairs:  Pt ascended/descended 3 stair(s) and 4" curb step with Rolling Walker with bilateral handrails with Contact Guard Assistance.     Treatment & Education:  Pt educated on importance of out of bed mobility and frequent ambulation.  Pt educated on fall prevention and safety at home.  Pt educated on management of swelling through RICE method.    Patient left up in chair with call button in reach and family present..    GOALS:   Multidisciplinary Problems       Physical Therapy Goals          Problem: Physical Therapy    Goal Priority Disciplines Outcome Interventions   Physical Therapy Goal     PT, PT/OT Progressing    Description: Pt will improve functional independence by performing:    Bed mobility: SBA  Sit to stand: SBA with rolling walker - MET  Bed to chair: SBA with Stand Step  with rolling walker   Car Transfer: SBA with rolling walker  Ambulation x 200'  feet with SBA and rolling walker MET  1 Step (Curb): Min A  and rolling walker MET  3 Steps: Min A  and B HR MET  left knee AROM flexion (in degrees): 90  left knee AROM extension (in degrees): 0   Independent with total knee HEP                          Time Tracking:     PT Received On:    PT Start Time: 1350     PT Stop Time: 1424  PT Total Time (min): 34 min     Billable Minutes: Gait Training 16 min and Therapeutic Activity 18 min    Treatment Type: Treatment  PT/PTA: PTA     Number of PTA visits since last PT visit: 1     01/15/2025  "

## 2025-01-15 NOTE — PLAN OF CARE
Problem: Physical Therapy  Goal: Physical Therapy Goal  Description: Pt will improve functional independence by performing:    Bed mobility: SBA  Sit to stand: SBA with rolling walker - MET  Bed to chair: SBA with Stand Step  with rolling walker   Car Transfer: SBA with rolling walker  Ambulation x 200'  feet with SBA and rolling walker  1 Step (Curb): Min A  and rolling walker  3 Steps: Min A  and B HR  left knee AROM flexion (in degrees): 90  left knee AROM extension (in degrees): 0   Independent with total knee HEP     Outcome: Progressing

## 2025-01-15 NOTE — OP NOTE
[facility]                   [address] [place] [address]    PATIENT NAME:      ZACHERY BENSON  YOB: 1960  CSN:               [id number]  MRN:               [id number]  ADMIT DATE:        01/13/2025 14:44:00  PHYSICIAN:         Pablito Abad Jr, MD                          OPERATIVE REPORT      DATE OF SURGERY:    01/14/2025 07:21:22    SURGEON:  Pablito Abad Jr, MD    PREOPERATIVE DIAGNOSIS:  Acute left total knee infection.    POSTOPERATIVE DIAGNOSIS:  Acute left total knee infection.    PROCEDURES:    1. Irrigation and debridement of the left total knee arthroplasty.  2. Left knee single-stage revision arthroplasty with polyethylene exchange.    ASSISTANT:  Kirti Espinal, nurse practitioner.  Ms. Espinal was essential in   retraction and in exposure of the knee and in closure.    COMPLICATIONS:  None.    IMPLANTS:    1. Size 7 x 9 mm CS polyethylene.  2. Antibiotic cement beads.    HISTORY OF PRESENT ILLNESS:  Zachery is a pleasant 64-year-old, who underwent a   left total knee arthroplasty in June 2024.  He has done very well   postoperatively and developed swelling, pain and fever following a urological   procedure.  Aspirate was concerning for infection.  I recommended the   above-mentioned procedure.  I discussed with him the risks, benefits,   alternatives of therapy.  He would like to proceed.    DESCRIPTION OF PROCEDURE:  Zachery was initially seen in the hospital where his   history and physical were reviewed.  His knee was marked, his consents reviewed.    All questions were answered.  He was taken to the operating room, placed   supine on the table where spinal anesthesia undertaken.  Left lower extremity   was prepped and draped in a sterile fashion.  An attending-led time-out   confirmed the operative side.  Perioperative antibiotics were administered.  I   then began the procedure.    I  utilized the same incision.  I sharply incised through skin and subcutaneous   tissue, identified the medial parapatellar arthrotomy.  I made my incision   through the knee cap, only had purulent effusion, which was evacuated.  He had a   synovitis throughout the knee, which was sharply debrided with a combination of   a knife, rongeur, curette, and Bovie.  I was able to clean out the entire knee   of all synovitis.  I debrided out the patellofemoral compartment underneath the   quad tendon, patella tendon, lateral gutter, medial gutter, and also the notch.    I then removed the polyethylene and I was able to then debride out the   posterior aspect of the knee both medially and laterally.  Happy with this, I   then irrigated with Irrisept, Xperience, and a 3-minute Betadine wash.  I then   moved to a clean back table and re-gowned and gloved.  I then impacted my new   polyethylene in place and then placed antibiotic beads throughout the knee.  I   then closed the incision in layers.  A sterile dressing was placed.  He was   awoken from anesthesia and then transferred to postop unit in good condition.        ______________________________  MD NELDA Scott Jr/ALEKSANDAR  DD:  01/14/2025  Time:  01:24PM  DT:  01/14/2025  Time:  08:11PM  Job #:  [id number]      OPERATIVE REPORT

## 2025-01-15 NOTE — PLAN OF CARE
Problem: Adult Inpatient Plan of Care  Goal: Plan of Care Review  Outcome: Progressing  Goal: Patient-Specific Goal (Individualized)  Outcome: Progressing  Goal: Absence of Hospital-Acquired Illness or Injury  Outcome: Progressing  Goal: Optimal Comfort and Wellbeing  Outcome: Progressing  Goal: Readiness for Transition of Care  Outcome: Progressing     Problem: Wound  Goal: Optimal Coping  Outcome: Progressing  Goal: Optimal Functional Ability  Outcome: Progressing  Goal: Absence of Infection Signs and Symptoms  Outcome: Progressing  Goal: Improved Oral Intake  Outcome: Progressing  Goal: Optimal Pain Control and Function  Outcome: Progressing  Goal: Skin Health and Integrity  Outcome: Progressing  Goal: Optimal Wound Healing  Outcome: Progressing     Problem: Infection  Goal: Absence of Infection Signs and Symptoms  Outcome: Progressing     Problem: Knee Arthroplasty  Goal: Optimal Coping  Outcome: Progressing  Goal: Absence of Bleeding  Outcome: Progressing  Goal: Effective Bowel Elimination  Outcome: Progressing  Goal: Fluid and Electrolyte Balance  Outcome: Progressing  Goal: Optimal Functional Ability  Outcome: Progressing  Goal: Absence of Infection Signs and Symptoms  Outcome: Progressing  Goal: Intact Neurovascular Status  Outcome: Progressing  Goal: Anesthesia/Sedation Recovery  Outcome: Progressing  Goal: Optimal Pain Control and Function  Outcome: Progressing  Goal: Nausea and Vomiting Relief  Outcome: Progressing  Goal: Effective Urinary Elimination  Outcome: Progressing  Goal: Effective Oxygenation and Ventilation  Outcome: Progressing     Problem: Pain Acute  Goal: Optimal Pain Control and Function  Outcome: Progressing

## 2025-01-16 LAB
BACTERIA SPEC ANAEROBE CULT: NORMAL
BACTERIA UR CULT: ABNORMAL
BACTERIA UR CULT: ABNORMAL

## 2025-01-16 PROCEDURE — 94799 UNLISTED PULMONARY SVC/PX: CPT

## 2025-01-16 PROCEDURE — 97116 GAIT TRAINING THERAPY: CPT | Mod: CQ

## 2025-01-16 PROCEDURE — 27000207 HC ISOLATION

## 2025-01-16 PROCEDURE — 97110 THERAPEUTIC EXERCISES: CPT | Mod: CQ

## 2025-01-16 PROCEDURE — A4216 STERILE WATER/SALINE, 10 ML: HCPCS | Performed by: ORTHOPAEDIC SURGERY

## 2025-01-16 PROCEDURE — 97530 THERAPEUTIC ACTIVITIES: CPT | Mod: CQ

## 2025-01-16 PROCEDURE — 99900031 HC PATIENT EDUCATION (STAT)

## 2025-01-16 PROCEDURE — 99900035 HC TECH TIME PER 15 MIN (STAT)

## 2025-01-16 PROCEDURE — 11000001 HC ACUTE MED/SURG PRIVATE ROOM

## 2025-01-16 PROCEDURE — 25000003 PHARM REV CODE 250: Performed by: INTERNAL MEDICINE

## 2025-01-16 PROCEDURE — 25000003 PHARM REV CODE 250: Performed by: ORTHOPAEDIC SURGERY

## 2025-01-16 PROCEDURE — 63600175 PHARM REV CODE 636 W HCPCS: Performed by: INTERNAL MEDICINE

## 2025-01-16 PROCEDURE — 25000003 PHARM REV CODE 250: Performed by: NURSE PRACTITIONER

## 2025-01-16 RX ORDER — ADHESIVE BANDAGE
30 BANDAGE TOPICAL DAILY PRN
Status: DISCONTINUED | OUTPATIENT
Start: 2025-01-16 | End: 2025-01-17 | Stop reason: HOSPADM

## 2025-01-16 RX ORDER — LACTULOSE 10 G/15ML
30 SOLUTION ORAL 3 TIMES DAILY
Status: DISPENSED | OUTPATIENT
Start: 2025-01-16 | End: 2025-01-17

## 2025-01-16 RX ORDER — MUPIROCIN 20 MG/G
OINTMENT TOPICAL 2 TIMES DAILY
Status: DISCONTINUED | OUTPATIENT
Start: 2025-01-16 | End: 2025-01-17 | Stop reason: HOSPADM

## 2025-01-16 RX ADMIN — CEFEPIME 2 G: 2 INJECTION, POWDER, FOR SOLUTION INTRAVENOUS at 03:01

## 2025-01-16 RX ADMIN — SODIUM CHLORIDE, PRESERVATIVE FREE 10 ML: 5 INJECTION INTRAVENOUS at 05:01

## 2025-01-16 RX ADMIN — PANTOPRAZOLE SODIUM 40 MG: 40 INJECTION, POWDER, LYOPHILIZED, FOR SOLUTION INTRAVENOUS at 09:01

## 2025-01-16 RX ADMIN — OXYCODONE HYDROCHLORIDE AND ACETAMINOPHEN 1 TABLET: 5; 325 TABLET ORAL at 04:01

## 2025-01-16 RX ADMIN — LACTULOSE 30 G: 20 SOLUTION ORAL at 04:01

## 2025-01-16 RX ADMIN — METHOCARBAMOL 750 MG: 750 TABLET ORAL at 05:01

## 2025-01-16 RX ADMIN — MUPIROCIN: 20 OINTMENT TOPICAL at 08:01

## 2025-01-16 RX ADMIN — OXYCODONE HYDROCHLORIDE AND ACETAMINOPHEN 1 TABLET: 5; 325 TABLET ORAL at 08:01

## 2025-01-16 RX ADMIN — SODIUM CHLORIDE, PRESERVATIVE FREE 10 ML: 5 INJECTION INTRAVENOUS at 12:01

## 2025-01-16 RX ADMIN — POLYETHYLENE GLYCOL 3350 17 G: 17 POWDER, FOR SOLUTION ORAL at 08:01

## 2025-01-16 RX ADMIN — MAGNESIUM HYDROXIDE 2400 MG: 400 SUSPENSION ORAL at 12:01

## 2025-01-16 RX ADMIN — VALSARTAN 320 MG: 160 TABLET, FILM COATED ORAL at 08:01

## 2025-01-16 RX ADMIN — OXYCODONE HYDROCHLORIDE AND ACETAMINOPHEN 1 TABLET: 5; 325 TABLET ORAL at 12:01

## 2025-01-16 RX ADMIN — CEFEPIME 2 G: 2 INJECTION, POWDER, FOR SOLUTION INTRAVENOUS at 12:01

## 2025-01-16 RX ADMIN — METHOCARBAMOL 750 MG: 750 TABLET ORAL at 02:01

## 2025-01-16 RX ADMIN — CEFEPIME 2 G: 2 INJECTION, POWDER, FOR SOLUTION INTRAVENOUS at 05:01

## 2025-01-16 RX ADMIN — SENNOSIDES AND DOCUSATE SODIUM 2 TABLET: 50; 8.6 TABLET ORAL at 08:01

## 2025-01-16 RX ADMIN — Medication 10 ML: at 08:01

## 2025-01-16 RX ADMIN — OXYCODONE HYDROCHLORIDE AND ACETAMINOPHEN 1 TABLET: 5; 325 TABLET ORAL at 03:01

## 2025-01-16 RX ADMIN — LACTULOSE 30 G: 20 SOLUTION ORAL at 08:01

## 2025-01-16 RX ADMIN — GABAPENTIN 300 MG: 300 CAPSULE ORAL at 08:01

## 2025-01-16 RX ADMIN — LEVOTHYROXINE SODIUM 50 MCG: 0.05 TABLET ORAL at 08:01

## 2025-01-16 RX ADMIN — PANTOPRAZOLE SODIUM 40 MG: 40 INJECTION, POWDER, LYOPHILIZED, FOR SOLUTION INTRAVENOUS at 08:01

## 2025-01-16 NOTE — PROGRESS NOTES
[facility]  [facility] Medicine Progress Note      Patient Name: Zachery Cordero  MRN: [id number]  Admission Date: 1/13/2025   Length of Stay: 2  Attending Physician: Herbert Cooper MD  Primary Care Provider: Josué Bass MD  Face-to-Face encounter date: 01/15/2025    Code Status: Not on file        Chief Complaint:   Left knee swelling        HPI:   Zachery Cordero is a 64 y.o. male with  has a past medical history of Arthritis, Bladder stone, BPH (benign prostatic hyperplasia), Does use hearing aid, Dry eye, ED (erectile dysfunction), Enlarged prostate with urinary obstruction, Hearing loss, HLD (hyperlipidemia), HTN (hypertension), Hypothyroidism, unspecified, Nocturia, Obesity, Personal history of colonic polyps (06/20/2018), Unspecified sensorineural hearing loss, Urinary catheter in place, and Uses walker..admitted under Dr. Abad on 1/13/2025 with the diagnosis of left knee swelling,pain x7 days, hx of left TKA 6/11/24 and right knee arthroplasty 12/10/24.  Had prostatectomy 12/31/24.  +low grade fever, no cp/sob/n/v/d.  Pt has had 2 wk hx of epigastric abd pain consistently and daily after eating 4 bites of food, last 3hours.  Hospital Medicine team was consulted for medical management     Overview/Hospital Course:  No notes on file       Interval Hx:   The pt has no c/o. He understands the treatment plan.    Review of Systems   All other systems reviewed and are negative.      Objective/physical exam:  General: In no acute distress, afebrile  Chest: Clear to auscultation bilaterally  Heart: RRR, +S1, S2, no appreciable murmur  Abdomen: Soft, nontender, BS +  MSK: left knee bandaged, wound vac in plance  Neurologic: Alert and oriented x4    VITAL SIGNS: 24 HRS MIN & MAX LAST   Temp  Min: 98.5 °F (36.9 °C)  Max: 99.4 °F (37.4 °C) 98.8 °F (37.1 °C)   BP  Min: 115/63  Max: 163/99 115/63   Pulse  Min: 101  Max: 114  (!) 114   Resp  Min: 18  Max: 18 18   SpO2  Min:  94 %  Max: 97 % 96 %       Recent Labs   Lab 01/13/25  1607 01/14/25  0411   WBC 11.28 9.99   RBC 5.46 4.97   HGB 14.1 12.9*   HCT 44.1 40.3*   MCV 80.8 81.1   MCH 25.8* 26.0*   MCHC 32.0* 32.0*   RDW 18.8* 18.3*    264   MPV 8.7 8.5       Recent Labs   Lab 01/13/25  1607 01/14/25  0411    138   K 4.3 4.2    105   CO2 23 23   BUN 22.5 20.8   CREATININE 0.98 0.90   CALCIUM 11.3* 10.2*   ALBUMIN 3.0* 2.6*   ALKPHOS 77 69   ALT 54 56*   AST 45* 43*   BILITOT 0.8 0.7        Microbiology Results (last 7 days)       Procedure Component Value Units Date/Time    Tissue Culture - Aerobic [[id number]] Collected: 01/14/25 1224    Order Status: Completed Specimen: Tissue from Knee, Left Updated: 01/15/25 0732     Tissue - Aerobic Culture No Growth At 24 Hours    Urine culture [[id number]]  (Abnormal)  (Susceptibility) Collected: 01/13/25 1618    Order Status: Completed Specimen: Urine Updated: 01/15/25 0723     Urine Culture >/= 100,000 colonies/ml SERRATIA MARCESCENS ESBL      10,000 - 25,000 colonies/ml GAMMA STREPTOCOCCUS    Blood Culture [[id number]]  (Normal) Collected: 01/13/25 2033    Order Status: Completed Specimen: Blood from Antecubital, Left Updated: 01/15/25 0103     Blood Culture No Growth At 24 Hours    Blood Culture [[id number]]  (Normal) Collected: 01/13/25 2033    Order Status: Completed Specimen: Blood from Hand, Right Updated: 01/15/25 0103     Blood Culture No Growth At 24 Hours    Gram Stain [[id number]] Collected: 01/14/25 1224    Order Status: Completed Specimen: Tissue from Knee, Left Updated: 01/14/25 2144     GRAM STAIN Few WBC observed      No bacteria seen    Anaerobic Culture [[id number]] Collected: 01/14/25 1224    Order Status: Sent Specimen: Tissue from Knee, Left Updated: 01/14/25 1248    Gram Stain [[id number]] Collected: 01/13/25 1537    Order Status: Completed Specimen: Body Fluid Updated: 01/13/25 3002     GRAM STAIN Many WBC observed      No bacteria seen              Radiology:  X-Ray Chest 1 View for Line/Tube Placement  Narrative: EXAMINATION:  XR CHEST 1 VIEW FOR LINE/TUBE PLACEMENT    CLINICAL HISTORY:  picc;    TECHNIQUE:  One view    COMPARISON:  January 13, 2025..    FINDINGS:  Right PICC line terminates within the superior vena cava and is optimal.  Otherwise, no significant interval change.  Impression: Optimal placement right PICC line.    Electronically signed by: Mukul Angel  Date:    01/14/2025  Time:    18:28      Scheduled Med:   ceFEPime IV (PEDS and ADULTS)  2 g Intravenous Q8H    gabapentin  300 mg Oral QHS    levothyroxine  50 mcg Oral Daily    pantoprazole  40 mg Intravenous BID    polyethylene glycol  17 g Oral Daily    senna-docusate 8.6-50 mg  2 tablet Oral BID    valsartan  320 mg Oral QHS        Continuous Infusions:   0.9% NaCl   Intravenous Continuous 75 mL/hr at 01/14/25 2320 New Bag at 01/14/25 2320        PRN Meds:    Current Facility-Administered Medications:     acetaminophen, 1,000 mg, Oral, On Call Procedure    acetaminophen, 325 mg, Oral, Q4H PRN    ceFAZolin (Ancef) IV (PEDS and ADULTS), 2 g, Intravenous, On Call Procedure    gabapentin, 300 mg, Oral, On Call Procedure    hydrALAZINE, 10 mg, Intravenous, Q3H PRN    HYDROmorphone (PF), 0.4 mg, Intravenous, Q5 Min PRN    labetalol, 10 mg, Intravenous, Q4H PRN    methocarbamoL, 750 mg, Oral, Q8H PRN    morphine, 4 mg, Intravenous, Q3H PRN    ondansetron, 4 mg, Oral, Q6H PRN    oxyCODONE-acetaminophen, 1 tablet, Oral, Q4H PRN    oxyCODONE-acetaminophen, 1 tablet, Oral, Q4H PRN    Flushing PICC/Midline Protocol, , , Until Discontinued **AND** sodium chloride 0.9%, 10 mL, Intravenous, Q12H PRN    traZODone, 50 mg, Oral, Nightly PRN     Nutrition Status:      Assessment/Plan:  Left knee prosthetic joint infection -growing serratia marcescens  Failed Left knee arthroscopy  Epigastric abdominal pain  Uti -Serratia marcescens ESBL  Hx htn,hld,hypothyroid     Plan  Left Knee arthrocentesis growing  Serratia marcescens  Sensitivities pending but likely the same as his Ucx's on 1/13 and 12/28  Consider DC on cipro IV BID x 6 wks from washout but will defer to ID  ID eval from Dr. Noguera noted            All diagnosis and differential diagnosis have been reviewed; assessment and plan has been documented; I have personally reviewed the labs and test results that are presently available; I have reviewed the patients medication list; I have reviewed the consulting providers response and recommendations. I have reviewed or attempted to review medical records based upon their availability      _____________________________________________________________________            Herbert Cooper MD   01/15/2025

## 2025-01-16 NOTE — PROGRESS NOTES
[facility]  Infectious Disease Progress Note      ASSESSMENT & PLAN:     Mr. Cordero is a 64 year old male with a past medical history of  arthritis, bladder stone, BPH, hearing loss (uses hearing aid), ED, HTN, hypothyroidism, HLD, who presented to [facility] on 1/13/2025 with fever and left knee pain and swelling. He had a left TKA on 6/11/2024 and was doing well enough to have a right TKA on 12/10/2024 which was healing well. He underwent a prostatectomy on 12/31/2024 and was discharged home on cefdinir s/t a urine culture on 12/28/24 with Serratia marcescens ESBL. Aspiration of left knee on 1/13/2025 grew Serratia marcescens as well. He was taken to the OR on 1/14/2024 with Dr. Abad for and I&D with single stage revision. Purulence was found under the knee cap with synovitis in the joint. He was placed on Vancomycin and Cefepime. ID is consulted for assistance in management.     Left TKA infection  S/p prostatectomy on 12/31/2024  H/o right TKA 12/11/2024    -OR cultures on 1/14/2025 without growth thus far   -aspiration culture on 1/13/2025 with serratia   -1/13 blood cultures without growth, denies any systemic signs of infection currently     PLAN:  -await final OR cultures   -await sensitivities of aspiration from 1/13/2024   -Continue Cefepime 2g IV q8 hours for now  -will likely need prolonged course of IV antibiotics for PJI. Awaiting culture results to finalize plan   -discussed with patient, wife and son at bedside     SUBJECTIVE:   Denies any fever, chills, or night sweats. Tolerating IV cefepime well. Denies any nausea, vomiting, or diarrhea.     MEDICATIONS:   Reviewed in EMR     REVIEW OF SYSTEMS:   Except as documented, all other systems reviewed and negative     PHYSICAL EXAM:   T 99.4 °F (37.4 °C)   /81   P (!) 116   RR 18   O2 98 %  GENERAL: awake, alert, oriented and in no acute distress, non-toxic appearing   HEENT: normocephalic atraumatic   NECK:  "supple   LUNGS: Clear bilaterally, no wheezing or rales, no accessory muscle use   CVS: Regular rate and rhythm, normal peripheral perfusion; RUE PICC WNL  ABD: Soft, non-tender, non-distended, bowel sounds present  EXTREMITIES: no clubbing or cyanosis, right knee with healing scar. Left knee with ace wrap and clean dressing  SKIN: Warm, dry.   NEURO: alert and oriented, grossly without focal deficits   PSYCHIATRIC: Cooperative    LABS AND IMAGING:     Recent Labs     01/13/25  1607 01/14/25  0411   WBC 11.28 9.99   RBC 5.46 4.97   HGB 14.1 12.9*   HCT 44.1 40.3*   MCV 80.8 81.1   MCH 25.8* 26.0*   MCHC 32.0* 32.0*   RDW 18.8* 18.3*    264     No results for input(s): "LACTIC" in the last 72 hours.  No results for input(s): "INR", "APTT", "D-DIMER" in the last 72 hours.  No results for input(s): "HGBA1C", "CHOL", "TRIG", "LDL", "VLDL", "HDL" in the last 72 hours.   Recent Labs     01/13/25  1607 01/13/25 2033 01/14/25  0411     --  138   K 4.3  --  4.2   CO2 23  --  23   BUN 22.5  --  20.8   CREATININE 0.98  --  0.90   GLUCOSE 103  --  90   CALCIUM 11.3*  --  10.2*   ALBUMIN 3.0*  --  2.6*   GLOBULIN 4.5*  --  3.8*   ALKPHOS 77  --  69   ALT 54  --  56*   AST 45*  --  43*   BILITOT 0.8  --  0.7   LIPASE  --  23  --      No results for input(s): "BNP", "CPK", "TROPONINI" in the last 72 hours.       X-Ray Chest 1 View for Line/Tube Placement  Narrative: EXAMINATION:  XR CHEST 1 VIEW FOR LINE/TUBE PLACEMENT    CLINICAL HISTORY:  picc;    TECHNIQUE:  One view    COMPARISON:  January 13, 2025..    FINDINGS:  Right PICC line terminates within the superior vena cava and is optimal.  Otherwise, no significant interval change.  Impression: Optimal placement right PICC line.    Electronically signed by: Mukul Angel  Date:    01/14/2025  Time:    18:28      ARINA Howell  Infectious Disease    "

## 2025-01-16 NOTE — PLAN OF CARE
Problem: Physical Therapy  Goal: Physical Therapy Goal  Description: Pt will improve functional independence by performing:    Bed mobility: SBA  Sit to stand: SBA with rolling walker - MET  Bed to chair: SBA with Stand Step  with rolling walker   Car Transfer: SBA with rolling walker  Ambulation x 200'  feet with SBA and rolling walker MET  1 Step (Curb): Min A  and rolling walker MET  3 Steps: Min A  and B HR MET  left knee AROM flexion (in degrees): 90  left knee AROM extension (in degrees): 0   Independent with total knee HEP     Outcome: Progressing

## 2025-01-16 NOTE — PT/OT/SLP PROGRESS
Physical Therapy Treatment    Patient Name:  Zachery Cordero   MRN:  [id number]    Recommendations:     Discharge Recommendations: Low Intensity Therapy  Discharge Equipment Recommendations: walker, rolling  Barriers to discharge:  medical    Assessment:     Zachery Cordero is a 64 y.o. male admitted with a medical diagnosis of <principal problem not specified>.  He presents with the following impairments/functional limitations: weakness, impaired endurance, impaired functional mobility, decreased lower extremity function, pain, decreased ROM, edema, orthopedic precautions .    PT NOTE: Pt demonstrated decreased safety with car transfer, pt and family may choose to have pt long sit in back seat until pt is able to increase left knee flexibility. Pt requires increased time with all activities.    Rehab Prognosis: Good; patient would benefit from acute skilled PT services to address these deficits and reach maximum level of function.    Recent Surgery: Procedure(s) (LRB):  IRRIGATION AND DEBRIDEMENT, LOWER EXTREMITY (Left)  REVISION, ARTHROPLASTY, KNEE - poly (Left) 2 Days Post-Op    Plan:     During this hospitalization, patient to be seen BID to address the identified rehab impairments via gait training, therapeutic activities, therapeutic exercises and progress toward the following goals:    Plan of Care Expires:  01/20/25    Subjective     Chief Complaint: anterior left knee pain  Patient/Family Comments/goals: discharge home tomorrow - home health  Pain/Comfort:  Pain Rating 1: 0/10  Location - Side 1: Left  Location - Orientation 1: anterior  Pain Addressed 1: Pre-medicate for activity, Reposition, Distraction  Pain Rating Post-Intervention 1: 6/10      Objective:     Communicated with nsg prior to session.  Patient found up in chair with   upon PT entry to room.     General Precautions: Standard, fall  Orthopedic Precautions: RLE weight bearing as tolerated  Braces: N/A  Respiratory Status: Room air      Functional Mobility:  Transfers:     Sit to Stand:  stand by assistance with rolling walker  Car Transfer: minimum assistance with  rolling walker  using  Step Transfer - increased time, vc and tactile cues required  Gait: stand-by with Rw, pt ambulated 150', 2 trials, with step-to, then step-through gait pattern, slow gait speed, increased time required.    Treatment & Education:  Pt educated on importance of out of bed mobility and frequent ambulation.  Pt and family educated on safety with car transfers.  Pt educated on pursed lip breathing technique.  Pt educated on fall prevention and safety at home.  Pt educated on management of swelling through RICE method.  Pt performed 10 reps of total knee HEPs with active assist of RLE.      (In degrees) AROM PROM   R knee flexion 58 67   R knee extension 5 0         Patient left up in chair with call button in reach and family present..    GOALS:   Multidisciplinary Problems       Physical Therapy Goals          Problem: Physical Therapy    Goal Priority Disciplines Outcome Interventions   Physical Therapy Goal     PT, PT/OT Progressing    Description: Pt will improve functional independence by performing:    Bed mobility: SBA  Sit to stand: SBA with rolling walker - MET  Bed to chair: SBA with Stand Step  with rolling walker   Car Transfer: SBA with rolling walker  Ambulation x 200'  feet with SBA and rolling walker MET  1 Step (Curb): Min A  and rolling walker MET  3 Steps: Min A  and B HR MET  left knee AROM flexion (in degrees): 90  left knee AROM extension (in degrees): 0   Independent with total knee HEP                          DME Justifications:   Zachery's mobility limitation cannot be sufficiently resolved by the use of a cane. His functional mobility deficit can be sufficiently resolved with the use of a Rolling Walker. Patient's mobility limitation significantly impairs their ability to participate in one of more activities of daily living.  The use of a RW  will significantly improve the patient's ability to participate in MRADLS and the patient will use it on regular basis in the home.    Time Tracking:     PT Received On:    PT Start Time: 1115     PT Stop Time: 1205  PT Total Time (min): 50 min     Billable Minutes: Gait Training 18 min, Therapeutic Activity 20 min, and Therapeutic Exercise 12 min    Treatment Type: Treatment  PT/PTA: PTA     Number of PTA visits since last PT visit: 2     01/16/2025

## 2025-01-16 NOTE — PLAN OF CARE
Problem: Adult Inpatient Plan of Care  Goal: Plan of Care Review  Outcome: Progressing  Goal: Patient-Specific Goal (Individualized)  Outcome: Progressing  Goal: Absence of Hospital-Acquired Illness or Injury  Outcome: Progressing  Goal: Optimal Comfort and Wellbeing  Outcome: Progressing  Goal: Readiness for Transition of Care  Outcome: Progressing     Problem: Wound  Goal: Optimal Coping  Outcome: Progressing  Goal: Optimal Functional Ability  Outcome: Progressing  Goal: Absence of Infection Signs and Symptoms  Outcome: Progressing  Goal: Improved Oral Intake  Outcome: Progressing  Goal: Optimal Pain Control and Function  Outcome: Progressing  Goal: Skin Health and Integrity  Outcome: Progressing  Goal: Optimal Wound Healing  Outcome: Progressing     Problem: Infection  Goal: Absence of Infection Signs and Symptoms  Outcome: Progressing     Problem: Knee Arthroplasty  Goal: Optimal Coping  Outcome: Progressing  Goal: Absence of Bleeding  Outcome: Progressing  Goal: Effective Bowel Elimination  Outcome: Progressing  Goal: Fluid and Electrolyte Balance  Outcome: Progressing  Goal: Optimal Functional Ability  Outcome: Progressing  Goal: Absence of Infection Signs and Symptoms  Outcome: Progressing  Goal: Intact Neurovascular Status  Outcome: Progressing  Goal: Anesthesia/Sedation Recovery  Outcome: Progressing  Goal: Optimal Pain Control and Function  Outcome: Progressing  Goal: Nausea and Vomiting Relief  Outcome: Progressing  Goal: Effective Urinary Elimination  Outcome: Progressing  Goal: Effective Oxygenation and Ventilation  Outcome: Progressing     Problem: Pain Acute  Goal: Optimal Pain Control and Function  Outcome: Progressing     Problem: Comorbidity Management  Goal: Blood Pressure in Desired Range  Outcome: Progressing

## 2025-01-16 NOTE — PT/OT/SLP PROGRESS
"Physical Therapy Treatment    Patient Name:  Zachery Cordero   MRN:  [id number]    Recommendations:     Discharge therapy intensity: Low Intensity Therapy   Discharge Equipment Recommendations: walker, rolling  Barriers to discharge: Impaired mobility and Ongoing medical needs    Assessment:     Zachery Cordero is a 64 y.o. male admitted with a medical diagnosis of s/p Left knee irrigation debridement, single stage revision.  He presents with the following impairments/functional limitations: weakness, impaired endurance, impaired functional mobility, decreased lower extremity function, pain, decreased ROM, edema, orthopedic precautions.    Rehab Prognosis: Good; patient would benefit from acute skilled PT services to address these deficits and reach maximum level of function.    Recent Surgery: Procedure(s) (LRB):  IRRIGATION AND DEBRIDEMENT, LOWER EXTREMITY (Left)  REVISION, ARTHROPLASTY, KNEE - poly (Left) 2 Days Post-Op    Plan:     During this hospitalization, patient would benefit from acute PT services BID to address the identified rehab impairments via gait training, therapeutic activities, therapeutic exercises and progress toward the following goals:    Plan of Care Expires:  01/20/25    Subjective     Chief Complaint: L knee pain  Patient/Family Comments/goals: "I should be going home tomorrow"  Pain/Comfort:  Pain Rating 1: other (see comments) (unrated)  Location - Side 1: Left  Location - Orientation 1: generalized  Location 1: knee  Pain Addressed 1: Nurse notified, Distraction      Objective:     Communicated with RN prior to session.  Patient found up in chair with peripheral IV, wound vac upon PT entry to room.     General Precautions: Standard, fall, contact  Orthopedic Precautions: LLE weight bearing as tolerated  Braces: N/A  Respiratory Status: Room air      Functional Mobility:  Transfers:     Sit to Stand:  stand by assistance with rolling walker  Gait: The pt ambulated ~300 ft with RW and SBA. " Step-through gait pattern, slowed pace. No LOB noted.     Therapeutic Activities/Exercises:  The pt performed x10 reps of total knee home exercise program on the LLE with active assistance. Increased time required to complete    Education:  Patient provided with verbal education education regarding PT role/goals/POC, fall prevention, safety awareness, and home exercise program.  Understanding was verbalized.     Patient left up in chair with all lines intact, call button in reach, and family present    GOALS:   Multidisciplinary Problems       Physical Therapy Goals          Problem: Physical Therapy    Goal Priority Disciplines Outcome Interventions   Physical Therapy Goal     PT, PT/OT Progressing    Description: Pt will improve functional independence by performing:    Bed mobility: SBA  Sit to stand: SBA with rolling walker - MET  Bed to chair: SBA with Stand Step  with rolling walker   Car Transfer: SBA with rolling walker  Ambulation x 200'  feet with SBA and rolling walker MET  1 Step (Curb): Min A  and rolling walker MET  3 Steps: Min A  and B HR MET  left knee AROM flexion (in degrees): 90  left knee AROM extension (in degrees): 0   Independent with total knee HEP                          Time Tracking:     PT Received On:    PT Start Time: 1330     PT Stop Time: 1408  PT Total Time (min): 38 min     Billable Minutes: Gait Training 15 and Therapeutic Exercise 23    Treatment Type: Treatment  PT/PTA: PT     Number of PTA visits since last PT visit: 0     01/16/2025

## 2025-01-16 NOTE — PROGRESS NOTES
[facility]  [facility] Medicine Progress Note      Patient Name: Zachery Cordero  MRN: [id number]  Admission Date: 1/13/2025   Length of Stay: 3  Attending Physician: Herbert Cooper MD  Primary Care Provider: Josué Bass MD  Face-to-Face encounter date: 01/16/2025    Code Status: Not on file        Chief Complaint:   Left knee swelling        HPI:   Zachery Cordero is a 64 y.o. male with  has a past medical history of Arthritis, Bladder stone, BPH (benign prostatic hyperplasia), Does use hearing aid, Dry eye, ED (erectile dysfunction), Enlarged prostate with urinary obstruction, Hearing loss, HLD (hyperlipidemia), HTN (hypertension), Hypothyroidism, unspecified, Nocturia, Obesity, Personal history of colonic polyps (06/20/2018), Unspecified sensorineural hearing loss, Urinary catheter in place, and Uses walker..admitted under Dr. Abad on 1/13/2025 with the diagnosis of left knee swelling,pain x7 days, hx of left TKA 6/11/24 and right knee arthroplasty 12/10/24.  Had prostatectomy 12/31/24.  +low grade fever, no cp/sob/n/v/d.  Pt has had 2 wk hx of epigastric abd pain consistently and daily after eating 4 bites of food, last 3hours.  Hospital Medicine team was consulted for medical management     Overview/Hospital Course:  No notes on file       Interval Hx:   The pt has no c/o. He is in his chair. He c/o constipation. Last BM 3 days ago. His wife is at the bedside.     Review of Systems   All other systems reviewed and are negative.      Objective/physical exam:  General: In no acute distress, afebrile  Chest: Clear to auscultation bilaterally  Heart: RRR, +S1, S2, no appreciable murmur  Abdomen: Soft, nontender, BS +  MSK: left knee bandaged, wound vac in plance  Neurologic: Alert and oriented x4    VITAL SIGNS: 24 HRS MIN & MAX LAST   Temp  Min: 98.8 °F (37.1 °C)  Max: 99.5 °F (37.5 °C) 99.4 °F (37.4 °C)   BP  Min: 115/78  Max: 133/83 126/80   Pulse  Min: 109   Max: 116  109   Resp  Min: 18  Max: 20 18   SpO2  Min: 95 %  Max: 98 % 96 %       Recent Labs   Lab 01/13/25  1607 01/14/25  0411   WBC 11.28 9.99   RBC 5.46 4.97   HGB 14.1 12.9*   HCT 44.1 40.3*   MCV 80.8 81.1   MCH 25.8* 26.0*   MCHC 32.0* 32.0*   RDW 18.8* 18.3*    264   MPV 8.7 8.5       Recent Labs   Lab 01/13/25  1607 01/14/25  0411    138   K 4.3 4.2    105   CO2 23 23   BUN 22.5 20.8   CREATININE 0.98 0.90   CALCIUM 11.3* 10.2*   ALBUMIN 3.0* 2.6*   ALKPHOS 77 69   ALT 54 56*   AST 45* 43*   BILITOT 0.8 0.7        Microbiology Results (last 7 days)       Procedure Component Value Units Date/Time    Urine culture [[id number]]  (Abnormal)  (Susceptibility) Collected: 01/13/25 1618    Order Status: Completed Specimen: Urine Updated: 01/16/25 1103     Urine Culture >/= 100,000 colonies/ml SERRATIA MARCESCENS ESBL      10,000 - 25,000 colonies/ml Enterococcus faecalis    Tissue Culture - Aerobic [[id number]]  (Abnormal) Collected: 01/14/25 1224    Order Status: Completed Specimen: Tissue from Knee, Left Updated: 01/16/25 1042     Tissue - Aerobic Culture Few Gram-negative Rods    Anaerobic Culture [[id number]] Collected: 01/14/25 1224    Order Status: Completed Specimen: Tissue from Knee, Left Updated: 01/16/25 1038     Anaerobe Culture No Anaerobes Isolated    Blood Culture [[id number]]  (Normal) Collected: 01/13/25 2033    Order Status: Completed Specimen: Blood from Antecubital, Left Updated: 01/16/25 0103     Blood Culture No Growth At 48 Hours    Blood Culture [[id number]]  (Normal) Collected: 01/13/25 2033    Order Status: Completed Specimen: Blood from Hand, Right Updated: 01/16/25 0103     Blood Culture No Growth At 48 Hours    Gram Stain [[id number]] Collected: 01/14/25 1224    Order Status: Completed Specimen: Tissue from Knee, Left Updated: 01/14/25 2146     GRAM STAIN Few WBC observed      No bacteria seen    Gram Stain [[id number]] Collected: 01/13/25 1533    Order Status:  Completed Specimen: Body Fluid Updated: 01/13/25 1826     GRAM STAIN Many WBC observed      No bacteria seen             Radiology:  X-Ray Chest 1 View for Line/Tube Placement  Narrative: EXAMINATION:  XR CHEST 1 VIEW FOR LINE/TUBE PLACEMENT    CLINICAL HISTORY:  picc;    TECHNIQUE:  One view    COMPARISON:  January 13, 2025..    FINDINGS:  Right PICC line terminates within the superior vena cava and is optimal.  Otherwise, no significant interval change.  Impression: Optimal placement right PICC line.    Electronically signed by: Mukul Angel  Date:    01/14/2025  Time:    18:28      Scheduled Med:   ceFEPime IV (PEDS and ADULTS)  2 g Intravenous Q8H    gabapentin  300 mg Oral QHS    levothyroxine  50 mcg Oral Daily    mupirocin   Nasal BID    pantoprazole  40 mg Intravenous BID    polyethylene glycol  17 g Oral Daily    senna-docusate 8.6-50 mg  2 tablet Oral BID    sodium chloride 0.9%  10 mL Intravenous Q6H    valsartan  320 mg Oral QHS        Continuous Infusions:         PRN Meds:    Current Facility-Administered Medications:     acetaminophen, 1,000 mg, Oral, On Call Procedure    acetaminophen, 325 mg, Oral, Q4H PRN    ceFAZolin (Ancef) IV (PEDS and ADULTS), 2 g, Intravenous, On Call Procedure    gabapentin, 300 mg, Oral, On Call Procedure    hydrALAZINE, 10 mg, Intravenous, Q3H PRN    HYDROmorphone (PF), 0.4 mg, Intravenous, Q5 Min PRN    labetalol, 10 mg, Intravenous, Q4H PRN    magnesium hydroxide 400 mg/5 ml, 30 mL, Oral, Daily PRN    methocarbamoL, 750 mg, Oral, Q8H PRN    morphine, 4 mg, Intravenous, Q3H PRN    ondansetron, 4 mg, Oral, Q6H PRN    oxyCODONE-acetaminophen, 1 tablet, Oral, Q4H PRN    oxyCODONE-acetaminophen, 1 tablet, Oral, Q4H PRN    Flushing PICC/Midline Protocol, , , Until Discontinued **AND** sodium chloride 0.9%, 10 mL, Intravenous, Q12H PRN    traZODone, 50 mg, Oral, Nightly PRN     Nutrition Status:      Assessment/Plan:  Left knee prosthetic joint infection -growing serratia  marcescens  Failed Left knee arthroscopy  Epigastric abdominal pain  Uti -Serratia marcescens ESBL  Hx of Prostate surgery  Hx htn,hld,hypothyroid     Plan  Left Knee arthrocentesis 1/13 growing Serratia marcescens  IntraOP left knee fluid cx 1/14 growing GNR Sensitivities pending   Consider DC on IV Levaquin 750 daily or cipro IV BID x 6 wks from washout but will defer final recommendations to ID final   ID eval noted  Give lactulose for constipation          All diagnosis and differential diagnosis have been reviewed; assessment and plan has been documented; I have personally reviewed the labs and test results that are presently available; I have reviewed the patients medication list; I have reviewed the consulting providers response and recommendations. I have reviewed or attempted to review medical records based upon their availability      _____________________________________________________________________            Herbert Cooper MD   01/16/2025

## 2025-01-17 VITALS
HEART RATE: 110 BPM | WEIGHT: 238.75 LBS | RESPIRATION RATE: 18 BRPM | OXYGEN SATURATION: 96 % | BODY MASS INDEX: 29.07 KG/M2 | SYSTOLIC BLOOD PRESSURE: 98 MMHG | TEMPERATURE: 99 F | HEIGHT: 76 IN | DIASTOLIC BLOOD PRESSURE: 67 MMHG

## 2025-01-17 PROBLEM — Z96.659 HISTORY OF KNEE REPLACEMENT: Status: ACTIVE | Noted: 2025-01-17

## 2025-01-17 PROBLEM — Z96.652 S/P REVISION OF TOTAL KNEE, LEFT: Status: ACTIVE | Noted: 2025-01-17

## 2025-01-17 LAB
ANION GAP SERPL CALC-SCNC: 10 MEQ/L
ANION GAP SERPL CALC-SCNC: 10 MEQ/L
ANION GAP SERPL CALC-SCNC: 12 MEQ/L
BACTERIA FLD CULT: ABNORMAL
BACTERIA SPEC ANAEROBE CULT: NORMAL
BACTERIA TISS AEROBE CULT: ABNORMAL
BUN SERPL-MCNC: 29.1 MG/DL (ref 8.4–25.7)
BUN SERPL-MCNC: 32.8 MG/DL (ref 8.4–25.7)
BUN SERPL-MCNC: 33.4 MG/DL (ref 8.4–25.7)
CALCIUM SERPL-MCNC: 12.4 MG/DL (ref 8.8–10)
CALCIUM SERPL-MCNC: 12.9 MG/DL (ref 8.8–10)
CALCIUM SERPL-MCNC: 13.3 MG/DL (ref 8.8–10)
CHLORIDE SERPL-SCNC: 101 MMOL/L (ref 98–107)
CHLORIDE SERPL-SCNC: 103 MMOL/L (ref 98–107)
CHLORIDE SERPL-SCNC: 104 MMOL/L (ref 98–107)
CO2 SERPL-SCNC: 22 MMOL/L (ref 23–31)
CREAT SERPL-MCNC: 1.09 MG/DL (ref 0.72–1.25)
CREAT SERPL-MCNC: 1.16 MG/DL (ref 0.72–1.25)
CREAT SERPL-MCNC: 1.25 MG/DL (ref 0.72–1.25)
CREAT/UREA NIT SERPL: 26
CREAT/UREA NIT SERPL: 27
CREAT/UREA NIT SERPL: 29
ERYTHROCYTE [DISTWIDTH] IN BLOOD BY AUTOMATED COUNT: 18.3 % (ref 11.5–17)
GFR SERPLBLD CREATININE-BSD FMLA CKD-EPI: >60 ML/MIN/1.73/M2
GLUCOSE SERPL-MCNC: 104 MG/DL (ref 82–115)
GLUCOSE SERPL-MCNC: 115 MG/DL (ref 82–115)
GLUCOSE SERPL-MCNC: 98 MG/DL (ref 82–115)
HCT VFR BLD AUTO: 45.6 % (ref 42–52)
HGB BLD-MCNC: 14.3 G/DL (ref 14–18)
MAGNESIUM SERPL-MCNC: 1.8 MG/DL (ref 1.6–2.6)
MCH RBC QN AUTO: 25.6 PG (ref 27–31)
MCHC RBC AUTO-ENTMCNC: 31.4 G/DL (ref 33–36)
MCV RBC AUTO: 81.6 FL (ref 80–94)
NRBC BLD AUTO-RTO: 0 %
PLATELET # BLD AUTO: 394 X10(3)/MCL (ref 130–400)
PMV BLD AUTO: 8.8 FL (ref 7.4–10.4)
POTASSIUM SERPL-SCNC: 4.6 MMOL/L (ref 3.5–5.1)
POTASSIUM SERPL-SCNC: 4.6 MMOL/L (ref 3.5–5.1)
POTASSIUM SERPL-SCNC: 4.7 MMOL/L (ref 3.5–5.1)
RBC # BLD AUTO: 5.59 X10(6)/MCL (ref 4.7–6.1)
SODIUM SERPL-SCNC: 135 MMOL/L (ref 136–145)
SODIUM SERPL-SCNC: 135 MMOL/L (ref 136–145)
SODIUM SERPL-SCNC: 136 MMOL/L (ref 136–145)
WBC # BLD AUTO: 13.94 X10(3)/MCL (ref 4.5–11.5)

## 2025-01-17 PROCEDURE — 80048 BASIC METABOLIC PNL TOTAL CA: CPT | Performed by: INTERNAL MEDICINE

## 2025-01-17 PROCEDURE — A4216 STERILE WATER/SALINE, 10 ML: HCPCS | Performed by: ORTHOPAEDIC SURGERY

## 2025-01-17 PROCEDURE — 36415 COLL VENOUS BLD VENIPUNCTURE: CPT | Performed by: INTERNAL MEDICINE

## 2025-01-17 PROCEDURE — 83735 ASSAY OF MAGNESIUM: CPT | Performed by: INTERNAL MEDICINE

## 2025-01-17 PROCEDURE — 85027 COMPLETE CBC AUTOMATED: CPT | Performed by: NURSE PRACTITIONER

## 2025-01-17 PROCEDURE — 36415 COLL VENOUS BLD VENIPUNCTURE: CPT | Performed by: NURSE PRACTITIONER

## 2025-01-17 PROCEDURE — 63600175 PHARM REV CODE 636 W HCPCS: Performed by: INTERNAL MEDICINE

## 2025-01-17 PROCEDURE — 25000003 PHARM REV CODE 250: Performed by: ORTHOPAEDIC SURGERY

## 2025-01-17 PROCEDURE — 97110 THERAPEUTIC EXERCISES: CPT | Mod: CQ

## 2025-01-17 PROCEDURE — 25000003 PHARM REV CODE 250: Performed by: INTERNAL MEDICINE

## 2025-01-17 PROCEDURE — 25000003 PHARM REV CODE 250: Performed by: NURSE PRACTITIONER

## 2025-01-17 PROCEDURE — 97116 GAIT TRAINING THERAPY: CPT | Mod: CQ

## 2025-01-17 PROCEDURE — 80048 BASIC METABOLIC PNL TOTAL CA: CPT | Performed by: NURSE PRACTITIONER

## 2025-01-17 RX ORDER — SODIUM CHLORIDE 9 MG/ML
INJECTION, SOLUTION INTRAVENOUS CONTINUOUS
Status: ACTIVE | OUTPATIENT
Start: 2025-01-17 | End: 2025-01-17

## 2025-01-17 RX ORDER — NAPROXEN SODIUM 220 MG/1
81 TABLET, FILM COATED ORAL 2 TIMES DAILY
Qty: 84 TABLET | Refills: 0 | Status: SHIPPED | OUTPATIENT
Start: 2025-01-17 | End: 2025-02-28

## 2025-01-17 RX ORDER — SODIUM CHLORIDE 0.9 % (FLUSH) 0.9 %
10 SYRINGE (ML) INJECTION EVERY 12 HOURS PRN
Start: 2025-01-17

## 2025-01-17 RX ORDER — POLYETHYLENE GLYCOL 3350 17 G/17G
17 POWDER, FOR SOLUTION ORAL DAILY
Start: 2025-01-17 | End: 2025-01-31

## 2025-01-17 RX ORDER — PANTOPRAZOLE SODIUM 40 MG/1
40 TABLET, DELAYED RELEASE ORAL DAILY
Qty: 30 TABLET | Refills: 0 | Status: SHIPPED | OUTPATIENT
Start: 2025-01-17 | End: 2025-02-16

## 2025-01-17 RX ORDER — OXYCODONE AND ACETAMINOPHEN 5; 325 MG/1; MG/1
1 TABLET ORAL EVERY 4 HOURS PRN
Qty: 42 TABLET | Refills: 0 | Status: SHIPPED | OUTPATIENT
Start: 2025-01-17 | End: 2025-01-24 | Stop reason: SDUPTHER

## 2025-01-17 RX ORDER — METHOCARBAMOL 750 MG/1
750 TABLET, FILM COATED ORAL EVERY 6 HOURS PRN
Qty: 56 TABLET | Refills: 0 | Status: SHIPPED | OUTPATIENT
Start: 2025-01-17 | End: 2025-01-31

## 2025-01-17 RX ORDER — NAPROXEN SODIUM 220 MG/1
81 TABLET, FILM COATED ORAL 2 TIMES DAILY
Status: DISCONTINUED | OUTPATIENT
Start: 2025-01-17 | End: 2025-01-17 | Stop reason: HOSPADM

## 2025-01-17 RX ADMIN — LEVOTHYROXINE SODIUM 50 MCG: 0.05 TABLET ORAL at 08:01

## 2025-01-17 RX ADMIN — Medication 10 ML: at 08:01

## 2025-01-17 RX ADMIN — PANTOPRAZOLE SODIUM 40 MG: 40 INJECTION, POWDER, LYOPHILIZED, FOR SOLUTION INTRAVENOUS at 08:01

## 2025-01-17 RX ADMIN — METHOCARBAMOL 750 MG: 750 TABLET ORAL at 06:01

## 2025-01-17 RX ADMIN — OXYCODONE HYDROCHLORIDE AND ACETAMINOPHEN 1 TABLET: 5; 325 TABLET ORAL at 06:01

## 2025-01-17 RX ADMIN — OXYCODONE HYDROCHLORIDE AND ACETAMINOPHEN 1 TABLET: 5; 325 TABLET ORAL at 01:01

## 2025-01-17 RX ADMIN — SODIUM CHLORIDE: 9 INJECTION, SOLUTION INTRAVENOUS at 11:01

## 2025-01-17 RX ADMIN — SODIUM CHLORIDE, PRESERVATIVE FREE 10 ML: 5 INJECTION INTRAVENOUS at 06:01

## 2025-01-17 RX ADMIN — OXYCODONE HYDROCHLORIDE AND ACETAMINOPHEN 1 TABLET: 5; 325 TABLET ORAL at 10:01

## 2025-01-17 RX ADMIN — ASPIRIN 81 MG: 81 TABLET, CHEWABLE ORAL at 10:01

## 2025-01-17 RX ADMIN — SODIUM CHLORIDE, PRESERVATIVE FREE 10 ML: 5 INJECTION INTRAVENOUS at 01:01

## 2025-01-17 RX ADMIN — SODIUM CHLORIDE, PRESERVATIVE FREE 10 ML: 5 INJECTION INTRAVENOUS at 10:01

## 2025-01-17 RX ADMIN — CEFEPIME 2 G: 2 INJECTION, POWDER, FOR SOLUTION INTRAVENOUS at 04:01

## 2025-01-17 RX ADMIN — CEFEPIME 2 G: 2 INJECTION, POWDER, FOR SOLUTION INTRAVENOUS at 10:01

## 2025-01-17 NOTE — CARE UPDATE
[facility]   Infectious Diseases OPAT Orders  2025     PATIENT: Zachery Cordero  :          1960   MRN:         [id number]     DIAGNOSIS:    Review of patient's allergies indicates:  No Known Allergies     WEIGHT:   CURRENT: Estimated Creatinine Clearance: 90 mL/min (based on SCr of 1.09 mg/dL).     MEDICATIONS:   1) Cefepime 2g IV q8h  - Duration: 42 days  - End date: 2025      **PLEASE FAX LAB RESULTS TO [phone]**  LABS:     Please check the following labs weekly x 6weeks: CBC, CMP, CRP, and ESR    ** LABS are NOT to be drawn from PICC site**      NURSING / OTHER:     [] Please place PICC line  [x] Routine PICC line care with weekly PICC line dressing changes  [] Patient to receive first dose of IV antimicrobials in a supervised setting  [x] Please provide home IV antimicrobial teaching  [x] OK to pull PICC line after completion of IV antimicrobial therapy    ADDITIONAL NOTES:     -Will likely need suppressive therapy upon completion of IV course. Will be determined as an outpatient based on clinical signs   -can follow-up in clinic in 3 weeks     ARINA Howell  [facility]

## 2025-01-17 NOTE — PLAN OF CARE
Awaiting feedback from infusion re: OOP & teaching.     Per Dr Cooper, pt discharge also pending repeat lab ( due to elev calcium).

## 2025-01-17 NOTE — PLAN OF CARE
Noted I.D. recommendations : Cefepime 2g IV q 8; end date 2/25/25.     Faxed updated orders to [facility].

## 2025-01-17 NOTE — PT/OT/SLP PROGRESS
Physical Therapy Treatment    Patient Name:  Zachery Cordero   MRN:  [id number]    Recommendations:     Discharge Recommendations: Low Intensity Therapy  Discharge Equipment Recommendations: walker, rolling  Barriers to discharge:  medical    Assessment:     Zachery Cordero is a 64 y.o. male admitted with a medical diagnosis of S/P revision of total knee, left.  He presents with the following impairments/functional limitations: weakness, impaired endurance, impaired functional mobility, decreased lower extremity function, pain, decreased ROM, edema, orthopedic precautions .    Rehab Prognosis: Good; patient would benefit from acute skilled PT services to address these deficits and reach maximum level of function.    Recent Surgery: Procedure(s) (LRB):  IRRIGATION AND DEBRIDEMENT, LOWER EXTREMITY (Left)  REVISION, ARTHROPLASTY, KNEE - poly (Left) 3 Days Post-Op    Plan:     During this hospitalization, patient to be seen BID to address the identified rehab impairments via gait training, therapeutic activities, therapeutic exercises and progress toward the following goals:    Plan of Care Expires:  01/20/25    Subjective     Chief Complaint: left knee stiffness  Patient/Family Comments/goals: discharge home today  Pain/Comfort:  Pain Rating 1: 0/10  Pain Addressed 1: Pre-medicate for activity  Pain Rating Post-Intervention 1: 0/10      Objective:     Communicated with nsg prior to session.  Patient found up in chair with   upon PT entry to room.     General Precautions: Standard, fall, contact  Orthopedic Precautions: LLE weight bearing as tolerated  Braces: N/A  Respiratory Status: Room air     Functional Mobility:  Transfers:     Sit to Stand:  supervision with rolling walker  Gait: stand by assistance with RW, pt ambulated 350' with step-to, then step-through gait pattern; slow gait speed    Treatment & Education:  Pt educated on fall prevention, safety at home, importance of out of bed mobility and frequent  ambulation, and management of swelling through RICE method.  Pt performed 10 reps of total knee HEPs with active assist of LLE.    Patient left up in chair with all lines intact, call button in reach, and family present..    GOALS:   Multidisciplinary Problems       Physical Therapy Goals          Problem: Physical Therapy    Goal Priority Disciplines Outcome Interventions   Physical Therapy Goal     PT, PT/OT Progressing    Description: Pt will improve functional independence by performing:    Bed mobility: SBA  Sit to stand: SBA with rolling walker - MET  Bed to chair: SBA with Stand Step  with rolling walker   Car Transfer: SBA with rolling walker  Ambulation x 200'  feet with SBA and rolling walker MET  1 Step (Curb): Min A  and rolling walker MET  3 Steps: Min A  and B HR MET  left knee AROM flexion (in degrees): 90  left knee AROM extension (in degrees): 0   Independent with total knee HEP                          DME Justifications:   Zachery's mobility limitation cannot be sufficiently resolved by the use of a cane. His functional mobility deficit can be sufficiently resolved with the use of a Rolling Walker. Patient's mobility limitation significantly impairs their ability to participate in one of more activities of daily living.  The use of a RW will significantly improve the patient's ability to participate in MRADLS and the patient will use it on regular basis in the home.    Time Tracking:     PT Received On:    PT Start Time: 1307     PT Stop Time: 1337  PT Total Time (min): 30 min     Billable Minutes: Gait Training 18 min and Therapeutic Exercise 12 min    Treatment Type: Treatment  PT/PTA: PTA     Number of PTA visits since last PT visit: 1 01/17/2025

## 2025-01-17 NOTE — PROGRESS NOTES
.[facility] - Orthopaedics  Orthopedics  Progress Note    Patient Name: Zachery Cordero  MRN: [id number]  Admission Date: 1/13/2025  Hospital Length of Stay: 4 days  Attending Provider: Pablito Abad Jr., MD  Primary Care Provider: Josué Bass MD  Follow-up For: Procedure(s) (LRB):  IRRIGATION AND DEBRIDEMENT, LOWER EXTREMITY (Left)  REVISION, ARTHROPLASTY, KNEE - poly (Left)    Post-Operative Day: 3 Day Post-Op  Subjective:     Principal Problem:<principal problem not specified>    Principal Orthopedic Problem: 3 Days Post-Op     Interval History: Walking in room.     Review of patient's allergies indicates:  No Known Allergies    Current Facility-Administered Medications   Medication    acetaminophen tablet 1,000 mg    acetaminophen tablet 325 mg    alteplase injection 2 mg    ceFAZolin 2 g    ceFEPIme injection 2 g    gabapentin capsule 300 mg    gabapentin capsule 300 mg    hydrALAZINE injection 10 mg    HYDROmorphone (PF) injection 0.4 mg    labetaloL injection 10 mg    lactulose 20 gram/30 mL solution Soln 30 g    levothyroxine tablet 50 mcg    magnesium hydroxide 400 mg/5 ml suspension 2,400 mg    methocarbamoL tablet 750 mg    morphine injection 4 mg    mupirocin 2 % ointment    ondansetron 4 mg/5 mL solution 4 mg    oxyCODONE-acetaminophen 5-325 mg per tablet 1 tablet    oxyCODONE-acetaminophen 7.5-325 mg per tablet 1 tablet    pantoprazole injection 40 mg    sodium chloride 0.9% flush 10 mL    sodium chloride 0.9% flush 10 mL    traZODone tablet 50 mg    valsartan tablet 320 mg     Objective:     Vital Signs (Most Recent):  Temp: 98.8 °F (37.1 °C) (01/17/25 0728)  Pulse: 98 (01/17/25 0728)  Resp: 18 (01/17/25 0634)  BP: 130/73 (01/17/25 0728)  SpO2: 96 % (01/17/25 0728) Vital Signs (24h Range):  Temp:  [97.8 °F (36.6 °C)-98.8 °F (37.1 °C)] 98.8 °F (37.1 °C)  Pulse:  [] 98  Resp:  [18-20] 18  SpO2:  [94 %-96 %] 96 %  BP: ()/(55-88) 130/73     Weight: 108.3 kg (238 lb 12.1  "oz)  Height: 6' 4" (193 cm)  Body mass index is 29.06 kg/m².      Intake/Output Summary (Last 24 hours) at 1/17/2025 0819  Last data filed at 1/17/2025 0542  Gross per 24 hour   Intake 1080 ml   Output --   Net 1080 ml       Physical Exam:   Musculoskeletal:     Left lower extremity: + swelling;  compartments are soft and compressible; Dressing c/d/i; dorsi/plantar flexes the foot; SILT distally; prevena intact.       Diagnostic Findings:   Significant Labs:   Recent Lab Results         01/17/25  0424   01/14/25  1224        Aerobic Culture - Tissue   Few Gram-negative Rods  [P]       Anaerobe Culture   No Anaerobes Isolated  [P]       Anion Gap 10.0         BUN 29.1         BUN/CREAT RATIO 27         Calcium 12.4  Comment: Critical Result called and verified by verbal readback to: SHAKIRA RUBI RN on 01/17/2025 at 05:40. Reported by [id number].         Chloride 104         CO2 22         Creatinine 1.09         eGFR >60         Glucose 115         GRAM STAIN   Few WBC observed          No bacteria seen       Magnesium  1.80         Potassium 4.6         Sodium 136                  [P] - Preliminary Result                  Assessment/Plan:     There are no hospital problems to display for this patient.        PLAN:   01/14/2025: Left knee irrigation debridement, single stage revision with polyethylene swap    Weightbear as tolerated left lower extremity   PT OT   continue IV antibiotics   ID consult for prolonged antibiotic treatment. Cultures pending  "

## 2025-01-17 NOTE — NURSING
Discharge instructions given to patient and son. Prescriptions for aspirin, robaxin, percocet given. Coverlets and tape given for home dressing changes. Stated understanding of all instructions.   Copy of discharge paperwork faxed to [facility] and [facility].

## 2025-01-17 NOTE — PLAN OF CARE
Problem: Physical Therapy  Goal: Physical Therapy Goal  Description: Pt will improve functional independence by performing:    Bed mobility: SBA  Sit to stand: SBA with rolling walker - MET  Bed to chair: SBA with Stand Step  with rolling walker   Car Transfer: SBA with rolling walker  Ambulation x 200'  feet with SBA and rolling walker MET  1 Step (Curb): Min A  and rolling walker MET  3 Steps: Min A  and B HR MET  left knee AROM flexion (in degrees): 90  left knee AROM extension (in degrees): 0   Independent with total knee HEP     1/17/2025 1050 by Dawna Brasher, WIL  Outcome: Progressing

## 2025-01-17 NOTE — PROGRESS NOTES
[facility]  [facility] Medicine Progress Note      Patient Name: Zachery Cordero  MRN: [id number]  Admission Date: 1/13/2025   Length of Stay: 4  Attending Physician: Herbert Cooper MD  Primary Care Provider: Josué Bass MD  Face-to-Face encounter date: 01/17/2025    Code Status: Not on file        Chief Complaint:   Left knee swelling        HPI:   Zachery Cordero is a 64 y.o. male with  has a past medical history of Arthritis, Bladder stone, BPH (benign prostatic hyperplasia), Does use hearing aid, Dry eye, ED (erectile dysfunction), Enlarged prostate with urinary obstruction, Hearing loss, HLD (hyperlipidemia), HTN (hypertension), Hypothyroidism, unspecified, Nocturia, Obesity, Personal history of colonic polyps (06/20/2018), Unspecified sensorineural hearing loss, Urinary catheter in place, and Uses walker..admitted under Dr. Abad on 1/13/2025 with the diagnosis of left knee swelling,pain x7 days, hx of left TKA 6/11/24 and right knee arthroplasty 12/10/24.  Had prostatectomy 12/31/24.  +low grade fever, no cp/sob/n/v/d.  Pt has had 2 wk hx of epigastric abd pain consistently and daily after eating 4 bites of food, last 3hours.  Hospital Medicine team was consulted for medical management     Overview/Hospital Course:  No notes on file       Interval Hx:   The pt has no c/o. He was noted to have an elevated calcium level. He is asymptomatic. He reports having this problem in the past which was confirmed with review of his labs.  training pt of home administration of IV abx.  His wife and son are also present.    Review of Systems   All other systems reviewed and are negative.      Objective/physical exam:  General: In no acute distress, afebrile  Chest: Clear to auscultation bilaterally  Heart: RRR, +S1, S2, no appreciable murmur, RUE picc line  Abdomen: Soft, nontender, BS +  MSK: left knee bandaged, wound vac in plance  Neurologic: Alert and oriented  x4    VITAL SIGNS: 24 HRS MIN & MAX LAST   Temp  Min: 97.8 °F (36.6 °C)  Max: 99.4 °F (37.4 °C) 99.4 °F (37.4 °C)   BP  Min: 95/55  Max: 144/83 98/67   Pulse  Min: 97  Max: 114  110   Resp  Min: 18  Max: 20 18   SpO2  Min: 94 %  Max: 96 % 96 %       Recent Labs   Lab 01/13/25  1607 01/14/25  0411 01/17/25  1043   WBC 11.28 9.99 13.94*   RBC 5.46 4.97 5.59   HGB 14.1 12.9* 14.3   HCT 44.1 40.3* 45.6   MCV 80.8 81.1 81.6   MCH 25.8* 26.0* 25.6*   MCHC 32.0* 32.0* 31.4*   RDW 18.8* 18.3* 18.3*    264 394   MPV 8.7 8.5 8.8       Recent Labs   Lab 01/13/25  1607 01/14/25  0411 01/17/25  0424 01/17/25  1042    138 136 135*   K 4.3 4.2 4.6 4.7    105 104 103   CO2 23 23 22* 22*   BUN 22.5 20.8 29.1* 32.8*   CREATININE 0.98 0.90 1.09 1.25   CALCIUM 11.3* 10.2* 12.4* 13.3*   MG  --   --  1.80  --    ALBUMIN 3.0* 2.6*  --   --    ALKPHOS 77 69  --   --    ALT 54 56*  --   --    AST 45* 43*  --   --    BILITOT 0.8 0.7  --   --         Microbiology Results (last 7 days)       Procedure Component Value Units Date/Time    Tissue Culture - Aerobic [[id number]]  (Abnormal)  (Susceptibility) Collected: 01/14/25 1224    Order Status: Completed Specimen: Tissue from Knee, Left Updated: 01/17/25 1217     Tissue - Aerobic Culture Few Serratia marcescens    Anaerobic Culture [[id number]] Collected: 01/14/25 1224    Order Status: Completed Specimen: Tissue from Knee, Left Updated: 01/17/25 1033     Anaerobe Culture No Anaerobes Isolated    Blood Culture [[id number]]  (Normal) Collected: 01/13/25 2033    Order Status: Completed Specimen: Blood from Antecubital, Left Updated: 01/17/25 0105     Blood Culture No Growth At 72 Hours    Blood Culture [[id number]]  (Normal) Collected: 01/13/25 2033    Order Status: Completed Specimen: Blood from Hand, Right Updated: 01/17/25 0105     Blood Culture No Growth At 72 Hours    Urine culture [[id number]]  (Abnormal)  (Susceptibility) Collected: 01/13/25 1618    Order Status:  Completed Specimen: Urine Updated: 01/16/25 1103     Urine Culture >/= 100,000 colonies/ml SERRATIA MARCESCENS ESBL      10,000 - 25,000 colonies/ml Enterococcus faecalis    Gram Stain [[id number]] Collected: 01/14/25 1224    Order Status: Completed Specimen: Tissue from Knee, Left Updated: 01/14/25 2144     GRAM STAIN Few WBC observed      No bacteria seen    Gram Stain [[id number]] Collected: 01/13/25 1537    Order Status: Completed Specimen: Body Fluid Updated: 01/13/25 1822     GRAM STAIN Many WBC observed      No bacteria seen             Radiology:  X-Ray Chest 1 View for Line/Tube Placement  Narrative: EXAMINATION:  XR CHEST 1 VIEW FOR LINE/TUBE PLACEMENT    CLINICAL HISTORY:  picc;    TECHNIQUE:  One view    COMPARISON:  January 13, 2025..    FINDINGS:  Right PICC line terminates within the superior vena cava and is optimal.  Otherwise, no significant interval change.  Impression: Optimal placement right PICC line.    Electronically signed by: Mukul Angel  Date:    01/14/2025  Time:    18:28      Scheduled Med:   aspirin  81 mg Oral BID    ceFEPime IV (PEDS and ADULTS)  2 g Intravenous Q8H    gabapentin  300 mg Oral QHS    levothyroxine  50 mcg Oral Daily    mupirocin   Nasal BID    pantoprazole  40 mg Intravenous BID    sodium chloride 0.9%  10 mL Intravenous Q6H    valsartan  320 mg Oral QHS        Continuous Infusions:   0.9% NaCl   Intravenous Continuous 250 mL/hr at 01/17/25 1157 New Bag at 01/17/25 1157          PRN Meds:    Current Facility-Administered Medications:     acetaminophen, 1,000 mg, Oral, On Call Procedure    acetaminophen, 325 mg, Oral, Q4H PRN    ceFAZolin (Ancef) IV (PEDS and ADULTS), 2 g, Intravenous, On Call Procedure    gabapentin, 300 mg, Oral, On Call Procedure    hydrALAZINE, 10 mg, Intravenous, Q3H PRN    HYDROmorphone (PF), 0.4 mg, Intravenous, Q5 Min PRN    labetalol, 10 mg, Intravenous, Q4H PRN    magnesium hydroxide 400 mg/5 ml, 30 mL, Oral, Daily PRN    methocarbamoL, 750  mg, Oral, Q8H PRN    morphine, 4 mg, Intravenous, Q3H PRN    ondansetron, 4 mg, Oral, Q6H PRN    oxyCODONE-acetaminophen, 1 tablet, Oral, Q4H PRN    oxyCODONE-acetaminophen, 1 tablet, Oral, Q4H PRN    Flushing PICC/Midline Protocol, , , Until Discontinued **AND** sodium chloride 0.9%, 10 mL, Intravenous, Q12H PRN    traZODone, 50 mg, Oral, Nightly PRN     Nutrition Status:      Assessment/Plan:  Left knee prosthetic joint infection -growing serratia marcescens  Failed Left knee arthroscopy  Uti -Serratia marcescens ESBL  Hx of Prostate surgery  Hx htn,hld,hypothyroid  Constipation -resolved  Hypercalcemia -recurrent     Plan  Aggressive hydration with IVF given for elevated calcium  Pt to continue good oral hydration at home as this is a recurrent problem over the past year.  Also stop his home use of Vitamin D3  He should f/u with his PCP in 1 week with a repeat BMP  Left Knee arthrocentesis 1/13 and IntraOP left knee fluid cx 1/14 growing growing Serratia marcescens  ID recommended cefepime 2gm IV q8h x 6 wks from washout with end date of 2/25/2025  check the following labs weekly x 6weeks: CBC, CMP, CRP, and ESR             All diagnosis and differential diagnosis have been reviewed; assessment and plan has been documented; I have personally reviewed the labs and test results that are presently available; I have reviewed the patients medication list; I have reviewed the consulting providers response and recommendations. I have reviewed or attempted to review medical records based upon their availability      _____________________________________________________________________            Herbert Cooper MD   01/17/2025

## 2025-01-18 PROCEDURE — G0180 MD CERTIFICATION HHA PATIENT: HCPCS | Mod: ,,, | Performed by: ORTHOPAEDIC SURGERY

## 2025-01-19 LAB
BACTERIA BLD CULT: NORMAL
BACTERIA BLD CULT: NORMAL

## 2025-01-21 ENCOUNTER — PATIENT OUTREACH (OUTPATIENT)
Dept: ADMINISTRATIVE | Facility: CLINIC | Age: 65
End: 2025-01-21
Payer: COMMERCIAL

## 2025-01-21 NOTE — PROGRESS NOTES
C3 nurse spoke with Zachery Cordero  for a TCC post hospital discharge follow up call. The patient has a scheduled [facility] appointment with Josué Bass MD  on Thursday, 01/23/2025 @ 11 am. Appointment with Dr. Abad on 01/22/2025 @ 3 pm. Appointment with ARINA Perales @ Dr. Abad's office on 02/03/2025 @ 815 am. Appointment with Colby Mckinney NP (Infectious Disease) on 02/06/2025 @ 3 pm; virtual.

## 2025-01-22 ENCOUNTER — LAB REQUISITION (OUTPATIENT)
Dept: LAB | Facility: HOSPITAL | Age: 65
End: 2025-01-22
Payer: COMMERCIAL

## 2025-01-22 DIAGNOSIS — M17.2 BILATERAL POST-TRAUMATIC OSTEOARTHRITIS OF KNEE: ICD-10-CM

## 2025-01-22 DIAGNOSIS — T84.54XA INFECTION AND INFLAMMATORY REACTION DUE TO INTERNAL LEFT KNEE PROSTHESIS, INITIAL ENCOUNTER: ICD-10-CM

## 2025-01-22 LAB
BASOPHILS # BLD AUTO: 0.04 X10(3)/MCL
BASOPHILS NFR BLD AUTO: 0.5 %
EOSINOPHIL # BLD AUTO: 0.5 X10(3)/MCL (ref 0–0.9)
EOSINOPHIL NFR BLD AUTO: 6.4 %
ERYTHROCYTE [DISTWIDTH] IN BLOOD BY AUTOMATED COUNT: 18.1 % (ref 11.5–17)
ERYTHROCYTE [SEDIMENTATION RATE] IN BLOOD: 25 MM/HR (ref 0–15)
HCT VFR BLD AUTO: 45.3 % (ref 42–52)
HGB BLD-MCNC: 14.1 G/DL (ref 14–18)
IMM GRANULOCYTES # BLD AUTO: 0.08 X10(3)/MCL (ref 0–0.04)
IMM GRANULOCYTES NFR BLD AUTO: 1 %
LYMPHOCYTES # BLD AUTO: 1.24 X10(3)/MCL (ref 0.6–4.6)
LYMPHOCYTES NFR BLD AUTO: 15.9 %
MCH RBC QN AUTO: 25.5 PG (ref 27–31)
MCHC RBC AUTO-ENTMCNC: 31.1 G/DL (ref 33–36)
MCV RBC AUTO: 81.9 FL (ref 80–94)
MONOCYTES # BLD AUTO: 0.44 X10(3)/MCL (ref 0.1–1.3)
MONOCYTES NFR BLD AUTO: 5.6 %
NEUTROPHILS # BLD AUTO: 5.51 X10(3)/MCL (ref 2.1–9.2)
NEUTROPHILS NFR BLD AUTO: 70.6 %
PLATELET # BLD AUTO: 289 X10(3)/MCL (ref 130–400)
PMV BLD AUTO: 9.3 FL (ref 7.4–10.4)
RBC # BLD AUTO: 5.53 X10(6)/MCL (ref 4.7–6.1)
WBC # BLD AUTO: 7.81 X10(3)/MCL (ref 4.5–11.5)

## 2025-01-22 PROCEDURE — 85652 RBC SED RATE AUTOMATED: CPT

## 2025-01-22 PROCEDURE — 85025 COMPLETE CBC W/AUTO DIFF WBC: CPT

## 2025-01-24 ENCOUNTER — TELEPHONE (OUTPATIENT)
Dept: INTERNAL MEDICINE | Facility: CLINIC | Age: 65
End: 2025-01-24
Payer: COMMERCIAL

## 2025-01-24 ENCOUNTER — LAB REQUISITION (OUTPATIENT)
Dept: LAB | Facility: HOSPITAL | Age: 65
End: 2025-01-24
Payer: COMMERCIAL

## 2025-01-24 DIAGNOSIS — M01.X62: ICD-10-CM

## 2025-01-24 DIAGNOSIS — Z96.652 S/P REVISION OF TOTAL KNEE, LEFT: ICD-10-CM

## 2025-01-24 LAB
ALBUMIN SERPL-MCNC: 3 G/DL (ref 3.4–4.8)
ALBUMIN/GLOB SERPL: 0.8 RATIO (ref 1.1–2)
ALP SERPL-CCNC: 89 UNIT/L (ref 40–150)
ALT SERPL-CCNC: 58 UNIT/L (ref 0–55)
ANION GAP SERPL CALC-SCNC: 9 MEQ/L
AST SERPL-CCNC: 30 UNIT/L (ref 5–34)
BILIRUB SERPL-MCNC: 0.4 MG/DL
BUN SERPL-MCNC: 24.9 MG/DL (ref 8.4–25.7)
CALCIUM SERPL-MCNC: 11.4 MG/DL (ref 8.8–10)
CHLORIDE SERPL-SCNC: 104 MMOL/L (ref 98–107)
CO2 SERPL-SCNC: 22 MMOL/L (ref 23–31)
CREAT SERPL-MCNC: 0.8 MG/DL (ref 0.72–1.25)
CREAT/UREA NIT SERPL: 31
CRP SERPL-MCNC: 38.5 MG/L
ERYTHROCYTE [DISTWIDTH] IN BLOOD BY AUTOMATED COUNT: 18.4 % (ref 11.5–17)
ERYTHROCYTE [SEDIMENTATION RATE] IN BLOOD: 18 MM/HR (ref 0–15)
GFR SERPLBLD CREATININE-BSD FMLA CKD-EPI: >60 ML/MIN/1.73/M2
GLOBULIN SER-MCNC: 3.8 GM/DL (ref 2.4–3.5)
GLUCOSE SERPL-MCNC: 98 MG/DL (ref 82–115)
HCT VFR BLD AUTO: 42.1 % (ref 42–52)
HGB BLD-MCNC: 13.5 G/DL (ref 14–18)
MCH RBC QN AUTO: 26.2 PG (ref 27–31)
MCHC RBC AUTO-ENTMCNC: 32.1 G/DL (ref 33–36)
MCV RBC AUTO: 81.7 FL (ref 80–94)
PLATELET # BLD AUTO: 378 X10(3)/MCL (ref 130–400)
PMV BLD AUTO: 8.7 FL (ref 7.4–10.4)
POTASSIUM SERPL-SCNC: 4.5 MMOL/L (ref 3.5–5.1)
PROT SERPL-MCNC: 6.8 GM/DL (ref 5.8–7.6)
RBC # BLD AUTO: 5.15 X10(6)/MCL (ref 4.7–6.1)
SODIUM SERPL-SCNC: 135 MMOL/L (ref 136–145)
WBC # BLD AUTO: 8.54 X10(3)/MCL (ref 4.5–11.5)

## 2025-01-24 PROCEDURE — 80053 COMPREHEN METABOLIC PANEL: CPT

## 2025-01-24 PROCEDURE — 85652 RBC SED RATE AUTOMATED: CPT

## 2025-01-24 PROCEDURE — 86140 C-REACTIVE PROTEIN: CPT

## 2025-01-24 PROCEDURE — 85027 COMPLETE CBC AUTOMATED: CPT

## 2025-01-24 RX ORDER — OXYCODONE AND ACETAMINOPHEN 5; 325 MG/1; MG/1
1 TABLET ORAL EVERY 4 HOURS PRN
Qty: 42 TABLET | Refills: 0 | Status: SHIPPED | OUTPATIENT
Start: 2025-01-24 | End: 2025-01-31

## 2025-01-24 NOTE — TELEPHONE ENCOUNTER
Do you want him to schedule a hospital follow up with you?    Additional Information: Please call back about a hospital f/u     He already has a hospital f/u scheduled with Dr. Colby Mckinney     **Was seen for hospital Total Knee Revision

## 2025-01-24 NOTE — DISCHARGE SUMMARY
[facility]  Orthopedic Surgery  Inpatient Discharge Summary:    Patient Name: Zachery Cordero  MRN: [id number]  Admission Date: 1/13/2025  Attending Provider:  No att. providers found  Primary Care Provider: Josué Bass MD      Discharge Date/Time: 1/17/2025  5:51 PM   Discharge Provider: Kirti Espinal    Diagnoses:   Active Hospital Problems    Diagnosis  POA    *S/P revision of total knee, left [Z96.652]  Not Applicable      Resolved Hospital Problems   No resolved problems to display.       Discharge Condition: Stable    Hospital Course: The patient was admitted for Left knee irrigation and debridement, revision knee arthroplasty. Patient tolerated the surgery well with no immediate postoperative complications. Patient progressed with physical therapy. Patient's pain was managed appropriately. Patient tolerated a diet. Patient is now stable and ready for discharge. The patient was given thorough discharge instructions and postoperative protocols. Patient will continue previously established pain control and DVT prophylaxis. Patient will follow up as an outpatient with Dr. Pablito Abad in 2 weeks.     Final Diagnoses: Same as principal problem.    Disposition: home      Medications:  Reconciled Home Medications:      Medication List        START taking these medications      aspirin 81 MG Chew  Take 1 tablet (81 mg total) by mouth 2 (two) times a day. Blood clot prevention     methocarbamoL 750 MG Tab  Commonly known as: ROBAXIN  Take 1 tablet (750 mg total) by mouth every 6 (six) hours as needed (muscle spasms).     pantoprazole 40 MG tablet  Commonly known as: PROTONIX  Take 1 tablet (40 mg total) by mouth once daily.     polyethylene glycol 17 gram Pwpk  Commonly known as: GLYCOLAX  Take 17 g by mouth once daily. Constipation Prevention for 14 days     sodium chloride 0.9% injection  Commonly known as: NORMAL SALINE FLUSH  Inject 10 mLs into the vein every 12 (twelve) hours as needed.             CHANGE how you take these medications      oxyCODONE-acetaminophen 5-325 mg per tablet  Commonly known as: PERCOCET  Take 1 tablet by mouth every 4 (four) hours as needed for Pain.  What changed: when to take this            CONTINUE taking these medications      b complex vitamins tablet  Take 1 tablet by mouth once daily.     DHEA ORAL  Take 1 tablet by mouth every evening.     fish oil-omega-3 fatty acids 300-1,000 mg capsule  Take by mouth once daily. 4 a day     folic acid 1 MG tablet  Commonly known as: FOLVITE  Take 1 mg by mouth once daily.     glucosamine-chondroitin 500-400 mg tablet  Take 1 tablet by mouth 2 (two) times a day.     levothyroxine 50 MCG tablet  Commonly known as: SYNTHROID  TAKE 1 TABLET BY MOUTH DAILY     meloxicam 15 MG tablet  Commonly known as: MOBIC  Take 1 tablet (15 mg total) by mouth once daily.     NON FORMULARY MEDICATION  Take 1 tablet by mouth every evening. L- argintine     NON FORMULARY MEDICATION  Take 1 tablet by mouth once daily. Umary acid hyluonic     NOZIN NASAL  62 % Kit  Generic drug: ethyl alcohoL  Apply topically 2 (two) times a day.     ondansetron 8 MG tablet  Commonly known as: ZOFRAN  Take 8 mg by mouth every 8 (eight) hours as needed for Nausea.     ONE DAILY MULTIVITAMIN per tablet  Generic drug: multivitamin  Take 1 tablet by mouth once daily.     saw palmetto 500 MG capsule  Take 500 mg by mouth 2 (two) times daily.     simvastatin 20 MG tablet  Commonly known as: ZOCOR  TAKE 1 TABLET BY MOUTH EVERY DAY AT BEDTIME     tadalafiL 20 MG Tab  Commonly known as: CIALIS  Take 20 mg by mouth once daily.     telmisartan 80 MG Tab  Commonly known as: MICARDIS  TAKE 1 TABLET BY MOUTH DAILY     testosterone cypionate 200 mg/mL injection  Commonly known as: DEPOTESTOTERONE CYPIONATE  Inject 1 mL (200 mg total) into the muscle every 14 (fourteen) days.     VEVYE 0.1 % Drop  Generic drug: cycloSPORINE  Apply 1 drop to eye 2 (two) times a day.     VITAMIN C 500  MG tablet  Generic drug: ascorbic acid (vitamin C)  Take 500 mg by mouth once daily.     zinc gluconate 50 mg tablet  Take 50 mg by mouth every evening.            STOP taking these medications      vitamin D 1000 units Tab  Commonly known as: VITAMIN D3            ASK your doctor about these medications      phenazopyridine 100 MG tablet  Commonly known as: PYRIDIUM  Take 100 mg by mouth 3 (three) times daily as needed for Pain.            Discharge Procedure Orders   Ambulatory referral/consult to Outpatient Infusion and Home Infusion   Standing Status: Future Standing Exp. Date: 02/15/26   Referral Priority: Routine   Referral Reason: Specialty Services Required   Number of Visits Requested: 1     Ambulatory referral/consult to Outpatient Infusion and Home Infusion   Standing Status: Future Standing Exp. Date: 02/17/26   Referral Priority: Routine   Referral Reason: Specialty Services Required   Number of Visits Requested: 1     HOME HEALTH ORDERS   Order Comments: Home health-  Med mgmt    Wound care -- dc provena 1/21/2025; then start dry dressing changes -- every other day.   Do not wet wound. No lotion, ointment, or creams on incision.     PT/OT - eval & tx; WBAT. FROM.   IV antibiotic infusion-x 6 weeks ( awaiting I.D. recs).   End date:   Weekly Lab-- Maintain PICC; dc PICC after last dose.     Order Specific Question Answer Comments   What Home Health Agency is the patient currently using? Other/External       Follow-up Information       [facility] Follow up.    Specialties: Home Health Services, Home Therapy Services, Home Living Aide Services  Why: This is home health agency. Call if you have questions or concerns.  Contact information:  [address] [facility] [address]  [phone]             [facility] [place] Follow up.    Why: This is infusion company. Call if you have questions or concerns.  Contact information:  [address]  [address]  [facility] [address]  [phone]             Pablito Abad Jr., MD. Go on 2/3/2025.    Specialty: Orthopedic Surgery  Why: Ortho follow up appt on Monday 2/3/25 @ 8:15am w/ Kirti. (Dr Abad's NP)  Contact information:  [address]  [address]  [facility] [id number]  [phone]               Josué Bass MD Follow up in 1 week(s).    Specialty: Internal Medicine  Why: with BMP to reeval calcium level  Contact information:  [address] [place] [address]  [phone]

## 2025-01-24 NOTE — TELEPHONE ENCOUNTER
----- Message from [employer] sent at 1/22/2025 12:23 PM CST -----  .Type:  Patient Returning Call    Who Called:pt  Who Left Message for Patient:pt  Does the patient know what this is regarding?:appt  Would the patient rather a call back or a response via [facility]? tay  Best Call Back Number:[phone]  Additional Information: Please call back about a hospital f/u

## 2025-01-28 ENCOUNTER — LAB REQUISITION (OUTPATIENT)
Dept: LAB | Facility: HOSPITAL | Age: 65
End: 2025-01-28
Payer: COMMERCIAL

## 2025-01-28 DIAGNOSIS — T84.54XA INFECTION AND INFLAMMATORY REACTION DUE TO INTERNAL LEFT KNEE PROSTHESIS, INITIAL ENCOUNTER: ICD-10-CM

## 2025-01-28 LAB
ALBUMIN SERPL-MCNC: 3 G/DL (ref 3.4–4.8)
ALBUMIN/GLOB SERPL: 0.8 RATIO (ref 1.1–2)
ALP SERPL-CCNC: 76 UNIT/L (ref 40–150)
ALT SERPL-CCNC: 34 UNIT/L (ref 0–55)
ANION GAP SERPL CALC-SCNC: 5 MEQ/L
AST SERPL-CCNC: 26 UNIT/L (ref 5–34)
BASOPHILS # BLD AUTO: 0.04 X10(3)/MCL
BASOPHILS NFR BLD AUTO: 0.5 %
BILIRUB SERPL-MCNC: 0.3 MG/DL
BUN SERPL-MCNC: 22.7 MG/DL (ref 8.4–25.7)
CALCIUM SERPL-MCNC: 10.8 MG/DL (ref 8.8–10)
CHLORIDE SERPL-SCNC: 109 MMOL/L (ref 98–107)
CO2 SERPL-SCNC: 23 MMOL/L (ref 23–31)
CREAT SERPL-MCNC: 0.79 MG/DL (ref 0.72–1.25)
CREAT/UREA NIT SERPL: 29
CRP SERPL-MCNC: 6 MG/L
EOSINOPHIL # BLD AUTO: 0.34 X10(3)/MCL (ref 0–0.9)
EOSINOPHIL NFR BLD AUTO: 4.7 %
ERYTHROCYTE [DISTWIDTH] IN BLOOD BY AUTOMATED COUNT: 19.2 % (ref 11.5–17)
ERYTHROCYTE [SEDIMENTATION RATE] IN BLOOD: 20 MM/HR (ref 0–15)
GFR SERPLBLD CREATININE-BSD FMLA CKD-EPI: >60 ML/MIN/1.73/M2
GLOBULIN SER-MCNC: 3.6 GM/DL (ref 2.4–3.5)
GLUCOSE SERPL-MCNC: 89 MG/DL (ref 82–115)
HCT VFR BLD AUTO: 43.5 % (ref 42–52)
HGB BLD-MCNC: 13.6 G/DL (ref 14–18)
IMM GRANULOCYTES # BLD AUTO: 0.02 X10(3)/MCL (ref 0–0.04)
IMM GRANULOCYTES NFR BLD AUTO: 0.3 %
LYMPHOCYTES # BLD AUTO: 1.4 X10(3)/MCL (ref 0.6–4.6)
LYMPHOCYTES NFR BLD AUTO: 19.2 %
MCH RBC QN AUTO: 25.6 PG (ref 27–31)
MCHC RBC AUTO-ENTMCNC: 31.3 G/DL (ref 33–36)
MCV RBC AUTO: 81.8 FL (ref 80–94)
MONOCYTES # BLD AUTO: 0.51 X10(3)/MCL (ref 0.1–1.3)
MONOCYTES NFR BLD AUTO: 7 %
NEUTROPHILS # BLD AUTO: 4.98 X10(3)/MCL (ref 2.1–9.2)
NEUTROPHILS NFR BLD AUTO: 68.3 %
PLATELET # BLD AUTO: 319 X10(3)/MCL (ref 130–400)
PMV BLD AUTO: 8.6 FL (ref 7.4–10.4)
POTASSIUM SERPL-SCNC: 4.6 MMOL/L (ref 3.5–5.1)
PROT SERPL-MCNC: 6.6 GM/DL (ref 5.8–7.6)
RBC # BLD AUTO: 5.32 X10(6)/MCL (ref 4.7–6.1)
SODIUM SERPL-SCNC: 137 MMOL/L (ref 136–145)
WBC # BLD AUTO: 7.29 X10(3)/MCL (ref 4.5–11.5)

## 2025-01-28 PROCEDURE — 85025 COMPLETE CBC W/AUTO DIFF WBC: CPT

## 2025-01-28 PROCEDURE — 86140 C-REACTIVE PROTEIN: CPT

## 2025-01-28 PROCEDURE — 85652 RBC SED RATE AUTOMATED: CPT

## 2025-01-28 PROCEDURE — 80053 COMPREHEN METABOLIC PANEL: CPT

## 2025-02-03 ENCOUNTER — OFFICE VISIT (OUTPATIENT)
Dept: ORTHOPEDICS | Facility: CLINIC | Age: 65
End: 2025-02-03
Payer: COMMERCIAL

## 2025-02-03 VITALS — BODY MASS INDEX: 29.07 KG/M2 | WEIGHT: 238.75 LBS | HEIGHT: 76 IN

## 2025-02-03 DIAGNOSIS — Z96.652 S/P REVISION OF TOTAL KNEE, LEFT: Primary | ICD-10-CM

## 2025-02-03 PROCEDURE — 4010F ACE/ARB THERAPY RXD/TAKEN: CPT | Mod: CPTII,,, | Performed by: NURSE PRACTITIONER

## 2025-02-03 PROCEDURE — 99024 POSTOP FOLLOW-UP VISIT: CPT | Mod: ,,, | Performed by: NURSE PRACTITIONER

## 2025-02-03 PROCEDURE — 1159F MED LIST DOCD IN RCRD: CPT | Mod: CPTII,,, | Performed by: NURSE PRACTITIONER

## 2025-02-03 PROCEDURE — 1125F AMNT PAIN NOTED PAIN PRSNT: CPT | Mod: CPTII,,, | Performed by: NURSE PRACTITIONER

## 2025-02-03 PROCEDURE — 1101F PT FALLS ASSESS-DOCD LE1/YR: CPT | Mod: CPTII,,, | Performed by: NURSE PRACTITIONER

## 2025-02-03 PROCEDURE — 3288F FALL RISK ASSESSMENT DOCD: CPT | Mod: CPTII,,, | Performed by: NURSE PRACTITIONER

## 2025-02-03 NOTE — PROGRESS NOTES
Chief Complaint:   Chief Complaint   Patient presents with    Left Knee - Post-op Evaluation    Post-op Evaluation     3 week s/p LT TKA I & D with revision here for wound check. Doing good, incision clean and dry. Changing Pic line weekly. Sx 1/14/25 Gl 2/28/25.        History of present illness:  06/11/2024: Left total knee arthroplasty   1/14/25: I&D Left Total Knee arthroplasty, poly exchange    He returns today.  His pain is improving.  He is working in home health physical therapy.  He has been taking his antibiotics through his PICC line.    Musculoskeletal:   Incision healed.  Range of motion 0-60 degrees      Imaging:        Assessment: S/P revision of total knee, left        Plan:  Doing well status post above.  I will see him back in 4 weeks for radiographs of the left knee.  We are going to transition to outpatient physical therapy

## 2025-02-04 ENCOUNTER — TELEPHONE (OUTPATIENT)
Dept: INTERNAL MEDICINE | Facility: CLINIC | Age: 65
End: 2025-02-04
Payer: COMMERCIAL

## 2025-02-04 ENCOUNTER — LAB REQUISITION (OUTPATIENT)
Dept: LAB | Facility: HOSPITAL | Age: 65
End: 2025-02-04
Payer: COMMERCIAL

## 2025-02-04 DIAGNOSIS — T84.59XA INFECTION AND INFLAMMATORY REACTION DUE TO OTHER INTERNAL JOINT PROSTHESIS, INITIAL ENCOUNTER: ICD-10-CM

## 2025-02-04 DIAGNOSIS — B96.89 OTHER SPECIFIED BACTERIAL AGENTS AS THE CAUSE OF DISEASES CLASSIFIED ELSEWHERE: ICD-10-CM

## 2025-02-04 LAB
ALBUMIN SERPL-MCNC: 3.6 G/DL (ref 3.4–4.8)
ALBUMIN/GLOB SERPL: 1.1 RATIO (ref 1.1–2)
ALP SERPL-CCNC: 77 UNIT/L (ref 40–150)
ALT SERPL-CCNC: 26 UNIT/L (ref 0–55)
ANION GAP SERPL CALC-SCNC: 13 MEQ/L
AST SERPL-CCNC: 29 UNIT/L (ref 5–34)
BASOPHILS # BLD AUTO: 0.04 X10(3)/MCL
BASOPHILS NFR BLD AUTO: 0.5 %
BILIRUB SERPL-MCNC: 0.3 MG/DL
BUN SERPL-MCNC: 22.1 MG/DL (ref 8.4–25.7)
CALCIUM SERPL-MCNC: 10.7 MG/DL (ref 8.8–10)
CHLORIDE SERPL-SCNC: 106 MMOL/L (ref 98–107)
CO2 SERPL-SCNC: 18 MMOL/L (ref 23–31)
CREAT SERPL-MCNC: 0.84 MG/DL (ref 0.72–1.25)
CREAT/UREA NIT SERPL: 26
CRP SERPL HS-MCNC: 2.4 MG/L
EOSINOPHIL # BLD AUTO: 0.34 X10(3)/MCL (ref 0–0.9)
EOSINOPHIL NFR BLD AUTO: 4.5 %
ERYTHROCYTE [DISTWIDTH] IN BLOOD BY AUTOMATED COUNT: 20.3 % (ref 11.5–17)
ERYTHROCYTE [SEDIMENTATION RATE] IN BLOOD: 47 MM/HR (ref 0–20)
GFR SERPLBLD CREATININE-BSD FMLA CKD-EPI: >60 ML/MIN/1.73/M2
GLOBULIN SER-MCNC: 3.2 GM/DL (ref 2.4–3.5)
GLUCOSE SERPL-MCNC: 152 MG/DL (ref 82–115)
HCT VFR BLD AUTO: 43.8 % (ref 42–52)
HGB BLD-MCNC: 14 G/DL (ref 14–18)
IMM GRANULOCYTES # BLD AUTO: 0.02 X10(3)/MCL (ref 0–0.04)
IMM GRANULOCYTES NFR BLD AUTO: 0.3 %
LYMPHOCYTES # BLD AUTO: 1.46 X10(3)/MCL (ref 0.6–4.6)
LYMPHOCYTES NFR BLD AUTO: 19.5 %
MCH RBC QN AUTO: 25.8 PG (ref 27–31)
MCHC RBC AUTO-ENTMCNC: 32 G/DL (ref 33–36)
MCV RBC AUTO: 80.8 FL (ref 80–94)
MONOCYTES # BLD AUTO: 0.38 X10(3)/MCL (ref 0.1–1.3)
MONOCYTES NFR BLD AUTO: 5.1 %
NEUTROPHILS # BLD AUTO: 5.24 X10(3)/MCL (ref 2.1–9.2)
NEUTROPHILS NFR BLD AUTO: 70.1 %
NRBC BLD AUTO-RTO: 0 %
PLATELET # BLD AUTO: 204 X10(3)/MCL (ref 130–400)
PMV BLD AUTO: 9.1 FL (ref 7.4–10.4)
POTASSIUM SERPL-SCNC: 4.1 MMOL/L (ref 3.5–5.1)
PROT SERPL-MCNC: 6.8 GM/DL (ref 5.8–7.6)
RBC # BLD AUTO: 5.42 X10(6)/MCL (ref 4.7–6.1)
SODIUM SERPL-SCNC: 137 MMOL/L (ref 136–145)
WBC # BLD AUTO: 7.48 X10(3)/MCL (ref 4.5–11.5)

## 2025-02-04 PROCEDURE — 85652 RBC SED RATE AUTOMATED: CPT

## 2025-02-04 PROCEDURE — 85025 COMPLETE CBC W/AUTO DIFF WBC: CPT

## 2025-02-04 PROCEDURE — 80053 COMPREHEN METABOLIC PANEL: CPT

## 2025-02-04 PROCEDURE — 86141 C-REACTIVE PROTEIN HS: CPT

## 2025-02-04 NOTE — TELEPHONE ENCOUNTER
Spoke to patient he advised that he has been experiencing many GI Issues since the multiple surgeries he has recently had.  He is only able to eat a limited amount of food, takes about 4-5 bites then feels the need to lay down.  Spicy food is not an issue        Recommendations?

## 2025-02-04 NOTE — TELEPHONE ENCOUNTER
----- Message from [employer] sent at 2/4/2025 12:17 PM CST -----  Regarding: Ruben Jean  .Who Called: Zachery Cordero    Caller is requesting assistance/information from provider's office.    Symptoms (please be specific): n/a   How long has patient had these symptoms:  n/a  List of preferred pharmacies on file (remove unneeded): [unfilled]  If different, enter pharmacy into here including location and phone number: n/a      Preferred Method of Contact: Phone Call  Patient's Preferred Phone Number on File: [phone]   Best Call Back Number, if different:  Additional Information: Pt is requesting to speak abhijeet Pena, he didn't specify what was needed just to get to her Vm. Please call to advise

## 2025-02-04 NOTE — TELEPHONE ENCOUNTER
Unable to advise on Incomplete Message... No Info given in message, will have to contact patient back to get necessary info for call

## 2025-02-06 ENCOUNTER — OFFICE VISIT (OUTPATIENT)
Dept: INFECTIOUS DISEASES | Facility: CLINIC | Age: 65
End: 2025-02-06
Payer: COMMERCIAL

## 2025-02-06 DIAGNOSIS — Z90.79 STATUS POST PROSTATECTOMY: ICD-10-CM

## 2025-02-06 DIAGNOSIS — T84.50XS INFECTION OF PROSTHETIC JOINT, SEQUELA: ICD-10-CM

## 2025-02-06 DIAGNOSIS — F51.01 PRIMARY INSOMNIA: Primary | ICD-10-CM

## 2025-02-06 DIAGNOSIS — Z79.2 RECEIVING INTRAVENOUS ANTIBIOTIC TREATMENT AS OUTPATIENT: ICD-10-CM

## 2025-02-06 DIAGNOSIS — Z96.652 S/P REVISION OF TOTAL KNEE, LEFT: Primary | ICD-10-CM

## 2025-02-06 PROCEDURE — 1125F AMNT PAIN NOTED PAIN PRSNT: CPT | Mod: CPTII,95,,

## 2025-02-06 PROCEDURE — 4010F ACE/ARB THERAPY RXD/TAKEN: CPT | Mod: CPTII,95,,

## 2025-02-06 PROCEDURE — 98006 SYNCH AUDIO-VIDEO EST MOD 30: CPT | Mod: 95,,,

## 2025-02-06 RX ORDER — HYDROXYZINE PAMOATE 25 MG/1
25 CAPSULE ORAL EVERY 8 HOURS PRN
COMMUNITY
End: 2025-02-06 | Stop reason: SDUPTHER

## 2025-02-06 RX ORDER — HYDROXYZINE PAMOATE 25 MG/1
25 CAPSULE ORAL NIGHTLY
Qty: 14 CAPSULE | Refills: 0 | Status: SHIPPED | OUTPATIENT
Start: 2025-02-06 | End: 2025-02-20

## 2025-02-06 NOTE — PROGRESS NOTES
Subjective:       Patient ID: Zachery Cordero 64 y.o.     Chief Complaint:   Chief Complaint   Patient presents with    Hospital Follow Up     Left TKA infection        HPI:  1/15/2025 hospital evaluation:   Mr. Cordero is a 64 year old male with a past medical history of  arthritis, bladder stone, BPH, hearing loss (uses hearing aid), ED, HTN, hypothyroidism, HLD, who presented to [facility] on 1/13/2025 with fever and left knee pain and swelling. He had a left TKA on 6/11/2024 and was doing well enough to have a right TKA on 12/10/2024 which was healing well. He underwent a prostatectomy on 12/31/2024 and was discharged home on cefdinir s/t a urine culture on 12/28/24 with Serratia marcescens ESBL. Aspiration of left knee on 1/13/2025 grew Serratia marcescens as well. He was taken to the OR on 1/14/2024 with Dr. Abad for and I&D with single stage revision. Purulence was found under the knee cap with synovitis in the joint. He was placed on Vancomycin and Cefepime. ID is consulted for assistance in management.      Left TKA infection  S/p prostatectomy on 12/31/2024  H/o right TKA 12/11/2024     -OR cultures on 1/14/2025 without growth thus far   -aspiration culture on 1/13/2025 with serratia   -1/13 blood cultures without growth, denies any systemic signs of infection currently      PLAN:  -Continue Cefepime 2g IV q8 hours for a planned 6 week course.  Anticipated end date on 02/25/2025  -we will need weekly CBC, CMP, ESR, and CRP while on IV antimicrobial therapy  --Will likely need suppressive therapy upon completion of IV course. Will be determined as an outpatient based on clinical signs   -can follow-up in clinic in 3 weeks    02/06/2025 virtual visit:   Mr. Cordero presents for follow up virtually today.  He continues on IV cefepime q.8 hours.  He denies any fever, chills, or night sweats.  He denies any nausea, vomiting, or diarrhea.  He is not missing any doses.  Left knee is healing well.  No  complaints currently    Past Medical History:   Diagnosis Date    Arthritis     Bladder stone     BPH (benign prostatic hyperplasia)     Does use hearing aid     Dry eye     ED (erectile dysfunction)     Enlarged prostate with urinary obstruction     Hearing loss     wears hearing aids    HLD (hyperlipidemia)     HTN (hypertension)     Hypothyroidism, unspecified     Nocturia     Obesity     Personal history of colonic polyps 06/20/2018    Colonoscopy Report    Unspecified sensorineural hearing loss     Urinary catheter in place     Uses walker         Past Surgical History:   Procedure Laterality Date    CATARACT EXTRACTION W/  INTRAOCULAR LENS IMPLANT Right     COLONOSCOPY  06/20/2018    COLONOSCOPY W/ POLYPECTOMY  06/27/2023    IRRIGATION AND DEBRIDEMENT OF LOWER EXTREMITY Left 1/14/2025    Procedure: IRRIGATION AND DEBRIDEMENT, LOWER EXTREMITY;  Surgeon: Pablito Abad Jr., MD;  Location: [facility] OR;  Service: Orthopedics;  Laterality: Left;    KNEE ARTHROSCOPY W/ MENISCAL REPAIR Right     MOUTH SURGERY      salivary gland    REVISION OF KNEE ARTHROPLASTY Left 1/14/2025    Procedure: REVISION, ARTHROPLASTY, KNEE - poly;  Surgeon: Pablito Abad Jr., MD;  Location: [facility] OR;  Service: Orthopedics;  Laterality: Left;    ROBOT-ASSISTED LAPAROSCOPIC SIMPLE PROSTATECTOMY USING DA LOUIE XI N/A 12/31/2024    Procedure: XI ROBOTIC PROSTATECTOMY, SIMPLE;  Surgeon: Kieran Townsend MD;  Location: [facility];  Service: Urology;  Laterality: N/A;  PLUS REMOVE BLADDER STONE    ROBOTIC ARTHROPLASTY, KNEE Left 06/11/2024    Procedure: SDD- ROBOTIC ARTHROPLASTY, KNEE, TOTAL;  Surgeon: Pablito Abad Jr., MD;  Location: [facility] OR;  Service: Orthopedics;  Laterality: Left;    ROBOTIC ARTHROPLASTY, KNEE Right 12/10/2024    Procedure: SDD - ROBOTIC ARTHROPLASTY, KNEE, TOTAL;  Surgeon: Pablito Abad Jr., MD;  Location: [facility] OR;  Service: Orthopedics;  Laterality: Right;        Social History     Socioeconomic History    Marital  status:    Tobacco Use    Smoking status: Some Days     Types: Cigars     Passive exposure: Yes    Smokeless tobacco: Never    Tobacco comments:     1-2 cigars a month.   Substance and Sexual Activity    Alcohol use: Yes     Alcohol/week: 10.0 standard drinks of alcohol     Types: 10 Cans of beer per week     Comment: 1-2 a day and sometimes none    Drug use: Never    Sexual activity: Yes     Partners: Female     Birth control/protection: Post-menopausal     Social Drivers of Health     Financial Resource Strain: Low Risk  (2/11/2025)    Overall Financial Resource Strain (CARDIA)     Difficulty of Paying Living Expenses: Not hard at all   Food Insecurity: No Food Insecurity (2/11/2025)    Hunger Vital Sign     Worried About Running Out of Food in the Last Year: Never true     Ran Out of Food in the Last Year: Never true   Transportation Needs: No Transportation Needs (2/11/2025)    PRAPARE - Transportation     Lack of Transportation (Medical): No     Lack of Transportation (Non-Medical): No   Physical Activity: Insufficiently Active (2/11/2025)    Exercise Vital Sign     Days of Exercise per Week: 2 days     Minutes of Exercise per Session: 20 min   Stress: No Stress Concern Present (2/11/2025)    [employer] - Occupational Stress Questionnaire     Feeling of Stress : Not at all   Housing Stability: Low Risk  (2/11/2025)    Housing Stability Vital Sign     Unable to Pay for Housing in the Last Year: No     Number of Times Moved in the Last Year: 0     Homeless in the Last Year: No        Family History   Problem Relation Name Age of Onset    Hypertension Mother      Hypertension Father Jaren Garland Consuelo     Brain aneurysm Father Jaren Garland Consuelo     Arthritis Father Jaren Ruizreece         Review of patient's allergies indicates:  No Known Allergies     Immunization History   Administered Date(s) Administered    COVID-19 Vaccine 05/18/2022    COVID-19, MRNA, LN-S, PF (Pfizer)  (Dewey Cap) 05/18/2022    COVID-19, MRNA, LN-S, PF (Pfizer) (Purple Cap) 03/09/2021, 03/30/2021, 10/04/2021    COVID-19, mRNA, LNP-S, PF, meghan-sucrose, 30 mcg/0.3 mL (Pfizer Ages 12+) 09/05/2024    COVID-19, mRNA, LNP-S, bivalent booster, PF (PFIZER OMICRON) 10/19/2022    Influenza - Quadrivalent 11/03/2016, 11/01/2017, 11/07/2017, 11/20/2018, 11/06/2020, 11/05/2021    Influenza - Quadrivalent - MDCK - PF 10/26/2019    Influenza - Quadrivalent - PF *Preferred* (6 months and older) 10/20/2010, 10/16/2011, 11/11/2014, 11/03/2016, 11/07/2017, 11/20/2018, 11/06/2020, 11/05/2021    Influenza - Trivalent - Afluria, Fluzone MDV 10/20/2010, 10/16/2011    Influenza - Trivalent - Fluarix, Flulaval, Fluzone, Afluria - PF 10/20/2010, 10/16/2011, 11/11/2014, 11/13/2014, 10/19/2015, 11/07/2017, 11/05/2021, 11/10/2022    Influenza - Trivalent - Flucelvax - PF 11/15/2013    Influenza A (H1N1) 2009 Monovalent - IM 11/16/2009    Pneumococcal Polysaccharide - 23 Valent 11/20/2014    Td (ADULT) 05/24/1990, 08/24/2018, 08/24/2018    Td - PF (ADULT) 04/18/2006    Tdap 11/20/2014    Zoster 08/07/2014, 11/13/2014, 05/12/2017    Zoster Recombinant 08/29/2018, 08/29/2018, 12/13/2018, 12/13/2018        Review of Systems   All other systems reviewed and are negative.         Objective:      There were no vitals taken for this visit.     Limited physical assessment to virtual visit    Labs: Reviewed most recent relevant labs available, notable results highlighted in this note    Imaging: Reviewed most recent relevant imaging studies available, notable results highlighted in this note    Assessment:       Problem List Items Addressed This Visit          Renal/    Status post prostatectomy       ID    Prosthetic joint infection    Receiving intravenous antibiotic treatment as outpatient       Orthopedic    S/P revision of total knee, left - Primary          Plan:   Mr. Cordero is currently completing a 6 week course of Cefepime 2g IV q8 hours for  Serratia infection in left TKA s/p single stage revision on 01/14/2025  -Anticipated end date on 02/25/2025  -continues without systemic signs of infections currently  -tolerating antimicrobial therapy well  -most recent labs on 02/04/2024 with WBC 7.48, platelets 204, creatinine 0.84, AST/ALT 29/26, ESR 47 (previously 102 on 01/13/2025), CRP 2.4 (previously 250.14 1 month prior)  -continue weekly CBC, CMP, ESR, and CRP while on IV antimicrobial therapy  -okay to remove PICC line upon completion of course  -may benefit from suppressive therapy upon completion of IV course  -can follow up again in clinic in 2 weeks    Follow up in about 2 weeks (around 2/20/2025).    Portions of this note dictated using EMR integrated voice recognition software, and may be subject to voice recognition errors not corrected at proofreading. Please contact writer for clarification if needed.    30 minutes of total time spent on the encounter, which includes face to face time and non-face to face time preparing to see the patient (eg, review of tests), Obtaining and/or reviewing separately obtained history, Documenting clinical information in the electronic or other health record, Independently interpreting results (not separately reported) and communicating results to the patient/family/caregiver, or Care coordination (not separately reported).

## 2025-02-13 ENCOUNTER — OFFICE VISIT (OUTPATIENT)
Dept: INTERNAL MEDICINE | Facility: CLINIC | Age: 65
End: 2025-02-13
Payer: COMMERCIAL

## 2025-02-13 VITALS
HEART RATE: 92 BPM | HEIGHT: 75 IN | DIASTOLIC BLOOD PRESSURE: 68 MMHG | SYSTOLIC BLOOD PRESSURE: 110 MMHG | WEIGHT: 242 LBS | BODY MASS INDEX: 30.09 KG/M2 | OXYGEN SATURATION: 99 %

## 2025-02-13 DIAGNOSIS — R79.89 LOW TESTOSTERONE IN MALE: ICD-10-CM

## 2025-02-13 DIAGNOSIS — E78.2 MIXED HYPERLIPIDEMIA: ICD-10-CM

## 2025-02-13 DIAGNOSIS — Z01.818 PRE-OP EXAM: Primary | ICD-10-CM

## 2025-02-13 DIAGNOSIS — R73.01 IFG (IMPAIRED FASTING GLUCOSE): ICD-10-CM

## 2025-02-13 DIAGNOSIS — Z00.00 WELLNESS EXAMINATION: ICD-10-CM

## 2025-02-13 DIAGNOSIS — I10 PRIMARY HYPERTENSION: ICD-10-CM

## 2025-02-13 DIAGNOSIS — I10 PRIMARY HYPERTENSION: Primary | ICD-10-CM

## 2025-02-13 DIAGNOSIS — Z12.5 SCREENING FOR PROSTATE CANCER: ICD-10-CM

## 2025-02-13 DIAGNOSIS — E06.3 HYPOTHYROIDISM DUE TO HASHIMOTO'S THYROIDITIS: ICD-10-CM

## 2025-02-13 PROCEDURE — 1160F RVW MEDS BY RX/DR IN RCRD: CPT | Mod: CPTII,,, | Performed by: INTERNAL MEDICINE

## 2025-02-13 PROCEDURE — 1125F AMNT PAIN NOTED PAIN PRSNT: CPT | Mod: CPTII,,, | Performed by: INTERNAL MEDICINE

## 2025-02-13 PROCEDURE — 1101F PT FALLS ASSESS-DOCD LE1/YR: CPT | Mod: CPTII,,, | Performed by: INTERNAL MEDICINE

## 2025-02-13 PROCEDURE — 3078F DIAST BP <80 MM HG: CPT | Mod: CPTII,,, | Performed by: INTERNAL MEDICINE

## 2025-02-13 PROCEDURE — 99214 OFFICE O/P EST MOD 30 MIN: CPT | Mod: ,,, | Performed by: INTERNAL MEDICINE

## 2025-02-13 PROCEDURE — 3074F SYST BP LT 130 MM HG: CPT | Mod: CPTII,,, | Performed by: INTERNAL MEDICINE

## 2025-02-13 PROCEDURE — 1111F DSCHRG MED/CURRENT MED MERGE: CPT | Mod: CPTII,,, | Performed by: INTERNAL MEDICINE

## 2025-02-13 PROCEDURE — 3008F BODY MASS INDEX DOCD: CPT | Mod: CPTII,,, | Performed by: INTERNAL MEDICINE

## 2025-02-13 PROCEDURE — 3288F FALL RISK ASSESSMENT DOCD: CPT | Mod: CPTII,,, | Performed by: INTERNAL MEDICINE

## 2025-02-13 PROCEDURE — 4010F ACE/ARB THERAPY RXD/TAKEN: CPT | Mod: CPTII,,, | Performed by: INTERNAL MEDICINE

## 2025-02-13 PROCEDURE — 1159F MED LIST DOCD IN RCRD: CPT | Mod: CPTII,,, | Performed by: INTERNAL MEDICINE

## 2025-02-13 NOTE — PROGRESS NOTES
Patient ID: Zachery Cordero is a 64 y.o. male.  Chief Complaint: Other Misc (Surgery clearance)    HPI:   History of Present Illness          64 y  Year old male he will preop for left cataract surgery.  Patient already had done the right side, expecting to have it electively on February 27   Patient clear for surgery   Patient on IV antibiotic for 6 weeks due to an infected prosthetic joint, after he underwent prostate procedure.  Otherwise no fever no chills patient is not using the aid of a rolling walker or a cane   Patient advised to change the simvastatin to 1/2 every other day Monday Wednesday Friday otherwise return to clinic as scheduled      Current Outpatient Medications:     ascorbic acid, vitamin C, (VITAMIN C) 500 MG tablet, Take 500 mg by mouth once daily., Disp: , Rfl:     aspirin 81 MG Chew, Take 1 tablet (81 mg total) by mouth 2 (two) times a day. Blood clot prevention, Disp: 84 tablet, Rfl: 0    b complex vitamins tablet, Take 1 tablet by mouth once daily., Disp: , Rfl:     cycloSPORINE (VEVYE) 0.1 % Drop, Apply 1 drop to eye 2 (two) times a day., Disp: , Rfl:     ethyl alcohoL (NOZIN NASAL ) 62 % Kit, Apply topically 2 (two) times a day., Disp: , Rfl:     folic acid (FOLVITE) 1 MG tablet, Take 1 mg by mouth once daily., Disp: , Rfl:     glucosamine-chondroitin 500-400 mg tablet, Take 1 tablet by mouth 2 (two) times a day., Disp: , Rfl:     hydrOXYzine pamoate (VISTARIL) 25 MG Cap, Take 1 capsule (25 mg total) by mouth nightly. for 14 days, Disp: 14 capsule, Rfl: 0    levothyroxine (SYNTHROID) 50 MCG tablet, TAKE 1 TABLET BY MOUTH DAILY, Disp: 90 tablet, Rfl: 3    meloxicam (MOBIC) 15 MG tablet, Take 1 tablet (15 mg total) by mouth once daily., Disp: 30 tablet, Rfl: 2    multivitamin (ONE DAILY MULTIVITAMIN) per tablet, Take 1 tablet by mouth once daily., Disp: , Rfl:     NON FORMULARY MEDICATION, Take 1 tablet by mouth every evening. L- argintine, Disp: , Rfl:     NON FORMULARY  MEDICATION, Take 1 tablet by mouth once daily. Umary acid hyluonic, Disp: , Rfl:     omega-3 fatty acids/fish oil (FISH OIL-OMEGA-3 FATTY ACIDS) 300-1,000 mg capsule, Take by mouth once daily. 4 a day, Disp: , Rfl:     ondansetron (ZOFRAN) 8 MG tablet, Take 8 mg by mouth every 8 (eight) hours as needed for Nausea., Disp: , Rfl:     pantoprazole (PROTONIX) 40 MG tablet, Take 1 tablet (40 mg total) by mouth once daily., Disp: 30 tablet, Rfl: 0    prasterone, DHEA, (DHEA ORAL), Take 1 tablet by mouth every evening., Disp: , Rfl:     saw palmetto 500 MG capsule, Take 500 mg by mouth 2 (two) times daily., Disp: , Rfl:     simvastatin (ZOCOR) 20 MG tablet, TAKE 1 TABLET BY MOUTH EVERY DAY AT BEDTIME, Disp: 90 tablet, Rfl: 3    sodium chloride 0.9% (NORMAL SALINE FLUSH) injection, Inject 10 mLs into the vein every 12 (twelve) hours as needed., Disp: , Rfl:     tadalafiL (CIALIS) 20 MG Tab, Take 20 mg by mouth once daily., Disp: , Rfl:     telmisartan (MICARDIS) 80 MG Tab, TAKE 1 TABLET BY MOUTH DAILY, Disp: 90 tablet, Rfl: 3    testosterone cypionate (DEPOTESTOTERONE CYPIONATE) 200 mg/mL injection, Inject 1 mL (200 mg total) into the muscle every 14 (fourteen) days., Disp: 10 mL, Rfl: 4    zinc gluconate 50 mg tablet, Take 50 mg by mouth every evening., Disp: , Rfl:     phenazopyridine (PYRIDIUM) 100 MG tablet, Take 100 mg by mouth 3 (three) times daily as needed for Pain. (Patient not taking: Reported on 2/3/2025), Disp: , Rfl:   ROS:   Constitutional: No weight gain, No fever, No chills, No fatigue.   Eyes: No blurring, No visual disturbances.   Ear/Nose/Mouth/Throat: No decreased hearing, No ear pain, No nasal congestion, No sore throat.   Respiratory: No shortness of breath, No cough, No wheezing.   Cardiovascular: No chest pain, No palpitations, No peripheral edema.   Gastrointestinal: No nausea, No vomiting, No diarrhea, No constipation, No abdominal pain.   Genitourinary: No dysuria, No hematuria.  "  Hematology/Lymphatics: No bruising tendency, No bleeding tendency, No swollen lymph glands.   Endocrine: No excessive thirst, No polyuria, No excessive hunger.   Musculoskeletal: No joint pain, No muscle pain, No decreased range of motion.   Integumentary: No rash, No pruritus.   Neurologic: No abnormal balance, No confusion, No headache.   Psychiatric: No anxiety, No depression, Not suicidal, No hallucinations.    PE/Vitals:   /68 (BP Location: Left arm, Patient Position: Sitting)   Pulse 92   Ht 6' 3" (1.905 m)   Wt 109.8 kg (242 lb)   SpO2 99%   BMI 30.25 kg/m²   General: Alert and oriented, No acute distress.   Eye: Normal conjunctiva without exudate.  HENMT: Normocephalic/AT, Normal hearing, Oral mucosa is moist and pink   Neck: No goiter visualized.   Respiratory: Lungs CTAB, Respirations are non-labored, Breath sounds are equal, Symmetrical chest wall expansion.  Cardiovascular: Normal rate, Regular rhythm, No murmur, No edema.   Gastrointestinal: Non-distended.   Genitourinary: Deferred.  Musculoskeletal: Normal ROM, Normal gait, No deformities or amputations.  Integumentary: Warm, Dry, Intact. No diaphoresis, or flushing.  Neurologic: No focal deficits, Cranial Nerves II-XII are grossly intact.   Psychiatric: Cooperative, Appropriate mood & affect, Normal judgment, Non-suicidal.  Assessment/Plan   Assessment & Plan             1. Pre-op exam  Comments:  clear for sx    2. Mixed hyperlipidemia  Comments:  change satati to  M W F    3. Primary hypertension  Comments:  stable  on meds  no symptoms      No orders of the defined types were placed in this encounter.    Education and counseling done face to face regarding medical conditions and plan. Contact office if new symptoms develop. Should any symptoms ever significantly worsen seek emergency medical attention/go to ER. Follow up at least yearly for wellness or sooner PRN. Nurse to call patient with any results. The patient is receptive, " expresses understanding and is agreeable to plan. All questions have been answered.  No follow-ups on file.  This note was generated with the assistance of ambient listening technology. Verbal consent was obtained by the patient and accompanying visitor(s) for the recording of patient appointment to facilitate this note. I attest to having reviewed and edited the generated note for accuracy, though some syntax or spelling errors may persist. Please contact the author of this note for any clarification.

## 2025-02-26 ENCOUNTER — OFFICE VISIT (OUTPATIENT)
Dept: INFECTIOUS DISEASES | Facility: CLINIC | Age: 65
End: 2025-02-26
Payer: COMMERCIAL

## 2025-02-26 VITALS
HEIGHT: 75 IN | RESPIRATION RATE: 18 BRPM | DIASTOLIC BLOOD PRESSURE: 83 MMHG | OXYGEN SATURATION: 98 % | HEART RATE: 93 BPM | BODY MASS INDEX: 30.1 KG/M2 | WEIGHT: 242.06 LBS | TEMPERATURE: 99 F | SYSTOLIC BLOOD PRESSURE: 124 MMHG

## 2025-02-26 DIAGNOSIS — A48.8 SERRATIA MARCESCENS INFECTION: ICD-10-CM

## 2025-02-26 DIAGNOSIS — T84.50XS INFECTION OF PROSTHETIC JOINT, SEQUELA: Primary | ICD-10-CM

## 2025-02-26 DIAGNOSIS — Z96.652 S/P REVISION OF TOTAL KNEE, LEFT: ICD-10-CM

## 2025-02-26 PROBLEM — Z79.2 RECEIVING INTRAVENOUS ANTIBIOTIC TREATMENT AS OUTPATIENT: Status: ACTIVE | Noted: 2025-02-26

## 2025-02-26 PROBLEM — T84.50XA PROSTHETIC JOINT INFECTION: Status: ACTIVE | Noted: 2025-02-26

## 2025-02-26 PROBLEM — Z90.79 STATUS POST PROSTATECTOMY: Status: ACTIVE | Noted: 2025-02-26

## 2025-02-26 PROCEDURE — 99999 PR PBB SHADOW E&M-EST. PATIENT-LVL III: CPT | Mod: PBBFAC,,,

## 2025-02-26 PROCEDURE — 1101F PT FALLS ASSESS-DOCD LE1/YR: CPT | Mod: CPTII,S$GLB,,

## 2025-02-26 PROCEDURE — 3288F FALL RISK ASSESSMENT DOCD: CPT | Mod: CPTII,S$GLB,,

## 2025-02-26 PROCEDURE — 99214 OFFICE O/P EST MOD 30 MIN: CPT | Mod: S$GLB,,,

## 2025-02-26 PROCEDURE — 4010F ACE/ARB THERAPY RXD/TAKEN: CPT | Mod: CPTII,S$GLB,,

## 2025-02-26 PROCEDURE — 1125F AMNT PAIN NOTED PAIN PRSNT: CPT | Mod: CPTII,S$GLB,,

## 2025-02-26 PROCEDURE — 3074F SYST BP LT 130 MM HG: CPT | Mod: CPTII,S$GLB,,

## 2025-02-26 PROCEDURE — 3079F DIAST BP 80-89 MM HG: CPT | Mod: CPTII,S$GLB,,

## 2025-02-26 PROCEDURE — 3008F BODY MASS INDEX DOCD: CPT | Mod: CPTII,S$GLB,,

## 2025-02-26 RX ORDER — CIPROFLOXACIN 500 MG/1
500 TABLET ORAL 2 TIMES DAILY
Qty: 60 TABLET | Refills: 1 | Status: SHIPPED | OUTPATIENT
Start: 2025-02-26 | End: 2025-04-09

## 2025-02-26 NOTE — PROGRESS NOTES
Subjective:       Patient ID: Zachery Cordero 64 y.o.     Chief Complaint:   Chief Complaint   Patient presents with    Hospital Follow Up     Left tka infection         HPI:  1/15/2025 hospital evaluation:   Mr. Cordero is a 64 year old male with a past medical history of  arthritis, bladder stone, BPH, hearing loss (uses hearing aid), ED, HTN, hypothyroidism, HLD, who presented to [facility] on 1/13/2025 with fever and left knee pain and swelling. He had a left TKA on 6/11/2024 and was doing well enough to have a right TKA on 12/10/2024 which was healing well. He underwent a prostatectomy on 12/31/2024 and was discharged home on cefdinir s/t a urine culture on 12/28/24 with Serratia marcescens ESBL. Aspiration of left knee on 1/13/2025 grew Serratia marcescens as well. He was taken to the OR on 1/14/2024 with Dr. Abad for and I&D with single stage revision. Purulence was found under the knee cap with synovitis in the joint. He was placed on Vancomycin and Cefepime. ID is consulted for assistance in management.      Left TKA infection  S/p prostatectomy on 12/31/2024  H/o right TKA 12/11/2024     -OR cultures on 1/14/2025 without growth thus far   -aspiration culture on 1/13/2025 with serratia   -1/13 blood cultures without growth, denies any systemic signs of infection currently      PLAN:  -Continue Cefepime 2g IV q8 hours for a planned 6 week course.  Anticipated end date on 02/25/2025  -we will need weekly CBC, CMP, ESR, and CRP while on IV antimicrobial therapy  --Will likely need suppressive therapy upon completion of IV course. Will be determined as an outpatient based on clinical signs   -can follow-up in clinic in 3 weeks     02/06/2025 virtual visit:   Mr. Cordero presents for follow up virtually today.  He continues on IV cefepime q.8 hours.  He denies any fever, chills, or night sweats.  He denies any nausea, vomiting, or diarrhea.  He is not missing any doses.  Left knee is healing well.  No  complaints currently    02/26/2025 office visit:  Patient presents today in follow up.  He denies any complications with IV cefepime or his PICC line.  He denies any fever, chills, or night sweats.  No redness or swelling to his knee.  He is working with PT and continuing his active daily lifestyle now and doing well.  He finished his last dose of IV cefepime today.      Past Medical History:   Diagnosis Date    Arthritis     Bladder stone     BPH (benign prostatic hyperplasia)     Does use hearing aid     Dry eye     ED (erectile dysfunction)     Enlarged prostate with urinary obstruction     Hearing loss     wears hearing aids    HLD (hyperlipidemia)     HTN (hypertension)     Hypothyroidism, unspecified     Nocturia     Obesity     Personal history of colonic polyps 06/20/2018    Colonoscopy Report    Unspecified sensorineural hearing loss     Urinary catheter in place     Uses walker         Past Surgical History:   Procedure Laterality Date    CATARACT EXTRACTION W/  INTRAOCULAR LENS IMPLANT Right     COLONOSCOPY  06/20/2018    COLONOSCOPY W/ POLYPECTOMY  06/27/2023    IRRIGATION AND DEBRIDEMENT OF LOWER EXTREMITY Left 1/14/2025    Procedure: IRRIGATION AND DEBRIDEMENT, LOWER EXTREMITY;  Surgeon: Pablito Abad Jr., MD;  Location: [facility] OR;  Service: Orthopedics;  Laterality: Left;    KNEE ARTHROSCOPY W/ MENISCAL REPAIR Right     MOUTH SURGERY      salivary gland    REVISION OF KNEE ARTHROPLASTY Left 1/14/2025    Procedure: REVISION, ARTHROPLASTY, KNEE - poly;  Surgeon: Pablito Abad Jr., MD;  Location: [facility] OR;  Service: Orthopedics;  Laterality: Left;    ROBOT-ASSISTED LAPAROSCOPIC SIMPLE PROSTATECTOMY USING DA LOUIE XI N/A 12/31/2024    Procedure: XI ROBOTIC PROSTATECTOMY, SIMPLE;  Surgeon: Kieran Townsend MD;  Location: [facility];  Service: Urology;  Laterality: N/A;  PLUS REMOVE BLADDER STONE    ROBOTIC ARTHROPLASTY, KNEE Left 06/11/2024    Procedure: SDD- ROBOTIC ARTHROPLASTY, KNEE, TOTAL;   Surgeon: Pablito Abad Jr., MD;  Location: [facility];  Service: Orthopedics;  Laterality: Left;    ROBOTIC ARTHROPLASTY, KNEE Right 12/10/2024    Procedure: SDD - ROBOTIC ARTHROPLASTY, KNEE, TOTAL;  Surgeon: Pablito Abad Jr., MD;  Location: [facility];  Service: Orthopedics;  Laterality: Right;        Social History     Socioeconomic History    Marital status:    Tobacco Use    Smoking status: Some Days     Types: Cigars     Passive exposure: Yes    Smokeless tobacco: Never    Tobacco comments:     1-2 cigars a month.   Substance and Sexual Activity    Alcohol use: Yes     Alcohol/week: 10.0 standard drinks of alcohol     Types: 10 Cans of beer per week     Comment: 1-2 a day and sometimes none    Drug use: Never    Sexual activity: Yes     Partners: Female     Birth control/protection: Post-menopausal     Social Drivers of Health     Financial Resource Strain: Low Risk  (2/11/2025)    Overall Financial Resource Strain (CARDIA)     Difficulty of Paying Living Expenses: Not hard at all   Food Insecurity: No Food Insecurity (2/11/2025)    Hunger Vital Sign     Worried About Running Out of Food in the Last Year: Never true     Ran Out of Food in the Last Year: Never true   Transportation Needs: No Transportation Needs (2/11/2025)    PRAPARE - Transportation     Lack of Transportation (Medical): No     Lack of Transportation (Non-Medical): No   Physical Activity: Insufficiently Active (2/11/2025)    Exercise Vital Sign     Days of Exercise per Week: 2 days     Minutes of Exercise per Session: 20 min   Stress: No Stress Concern Present (2/11/2025)    [employer] - Occupational Stress Questionnaire     Feeling of Stress : Not at all   Housing Stability: Low Risk  (2/11/2025)    Housing Stability Vital Sign     Unable to Pay for Housing in the Last Year: No     Number of Times Moved in the Last Year: 0     Homeless in the Last Year: No        Family History   Problem Relation Name Age of  "Onset    Hypertension Mother      Hypertension Father Jaren Cordero     Brain aneurysm Father Jaren Cordero     Arthritis Father Jaren Cordero         Review of patient's allergies indicates:  No Known Allergies     Immunization History   Administered Date(s) Administered    COVID-19 Vaccine 05/18/2022    COVID-19, MRNA, LN-S, PF (Pfizer) (Gray Cap) 05/18/2022    COVID-19, MRNA, LN-S, PF (Pfizer) (Purple Cap) 03/09/2021, 03/30/2021, 10/04/2021    COVID-19, mRNA, LNP-S, PF, meghan-sucrose, 30 mcg/0.3 mL (Pfizer Ages 12+) 09/05/2024    COVID-19, mRNA, LNP-S, bivalent booster, PF (PFIZER OMICRON) 10/19/2022    Influenza - Quadrivalent 11/03/2016, 11/01/2017, 11/07/2017, 11/20/2018, 11/06/2020, 11/05/2021    Influenza - Quadrivalent - MDCK - PF 10/26/2019    Influenza - Quadrivalent - PF *Preferred* (6 months and older) 10/20/2010, 10/16/2011, 11/11/2014, 11/03/2016, 11/07/2017, 11/20/2018, 11/06/2020, 11/05/2021    Influenza - Trivalent - Afluria, Fluzone MDV 10/20/2010, 10/16/2011    Influenza - Trivalent - Fluarix, Flulaval, Fluzone, Afluria - PF 10/20/2010, 10/16/2011, 11/11/2014, 11/13/2014, 10/19/2015, 11/07/2017, 11/05/2021, 11/10/2022    Influenza - Trivalent - Flucelvax - PF 11/15/2013    Influenza A (H1N1) 2009 Monovalent - IM 11/16/2009    Pneumococcal Polysaccharide - 23 Valent 11/20/2014    Td (ADULT) 05/24/1990, 08/24/2018, 08/24/2018    Td - PF (ADULT) 04/18/2006    Tdap 11/20/2014    Zoster 08/07/2014, 11/13/2014, 05/12/2017    Zoster Recombinant 08/29/2018, 08/29/2018, 12/13/2018, 12/13/2018        Review of Systems   All other systems reviewed and are negative.         Objective:      /83 (BP Location: Left arm, Patient Position: Sitting)   Pulse 93   Temp 98.6 °F (37 °C) (Oral)   Resp 18   Ht 6' 3" (1.905 m)   Wt 109.8 kg (242 lb 1 oz)   SpO2 98%   BMI 30.26 kg/m²      Physical Exam  Vitals reviewed.   Constitutional:       Appearance: Normal appearance.   HENT:      Head: " Normocephalic and atraumatic.   Eyes:      Pupils: Pupils are equal, round, and reactive to light.   Cardiovascular:      Rate and Rhythm: Normal rate and regular rhythm.   Pulmonary:      Effort: Pulmonary effort is normal.   Abdominal:      General: Abdomen is flat.   Musculoskeletal:         General: Normal range of motion.      Cervical back: Neck supple.   Skin:     General: Skin is warm and dry.      Comments: L knee surgical incision healing well with no redness, swelling, or tenderness.   Neurological:      Mental Status: He is alert and oriented to person, place, and time.   Psychiatric:         Mood and Affect: Mood normal.         Behavior: Behavior normal.          Labs: Reviewed most recent relevant labs available, notable results highlighted in this note    Imaging: Reviewed most recent relevant imaging studies available, notable results highlighted in this note    Assessment:       Problem List Items Addressed This Visit          ID    Prosthetic joint infection - Primary    Serratia marcescens infection       Orthopedic    S/P revision of total knee, left          Plan:   - Patient with L total knee arthroplasty in June of 2024.    -Presented to the hospital on 1/13/2025 with increased pain and swelling to the L knee.    -On 1/13/2025 he underwent aspiration of the L knee which grew Serratia Marcescens and on 1/14/2025 he was taken to the OR for and I&D and single stage revision.  The OR culture subsequently grew serratia as well.  - He was discharged on 6 weeks of IV Cefepime, which he completed his last dose this morning prior to his appt here.  -Given this was a PJI with a single stage revision, we are planning to follow his IV ABX with 6 additional weeks of PO Ciprofloxacin and we will determine any further need for ABX therapy after completing this.    -Prescription for Ciprofloxacin sent today, patient understands to begin tomorrow.    -We will follow up closely, planning to see in 4  weeks  -Patient understands to reach out for any fevers, chills, or night sweats or any additional signs of infection at L knee.    -Removed PICC line in room today with no bleeding or swelling noted.          Follow up in about 4 weeks (around 3/26/2025).    Portions of this note dictated using EMR integrated voice recognition software, and may be subject to voice recognition errors not corrected at proofreading. Please contact writer for clarification if needed.    35 minutes of total time spent on the encounter, which includes face to face time and non-face to face time preparing to see the patient (eg, review of tests), Obtaining and/or reviewing separately obtained history, Documenting clinical information in the electronic or other health record, Independently interpreting results (not separately reported) and communicating results to the patient/family/caregiver, or Care coordination (not separately reported).

## 2025-03-03 ENCOUNTER — EXTERNAL HOME HEALTH (OUTPATIENT)
Dept: HOME HEALTH SERVICES | Facility: HOSPITAL | Age: 65
End: 2025-03-03
Payer: COMMERCIAL

## 2025-03-05 ENCOUNTER — HOSPITAL ENCOUNTER (OUTPATIENT)
Dept: RADIOLOGY | Facility: CLINIC | Age: 65
Discharge: HOME | End: 2025-03-05
Attending: NURSE PRACTITIONER
Payer: COMMERCIAL

## 2025-03-05 ENCOUNTER — OFFICE VISIT (OUTPATIENT)
Dept: ORTHOPEDICS | Facility: CLINIC | Age: 65
End: 2025-03-05
Payer: COMMERCIAL

## 2025-03-05 VITALS
DIASTOLIC BLOOD PRESSURE: 89 MMHG | HEIGHT: 75 IN | SYSTOLIC BLOOD PRESSURE: 131 MMHG | BODY MASS INDEX: 30.84 KG/M2 | HEART RATE: 91 BPM | WEIGHT: 248 LBS

## 2025-03-05 DIAGNOSIS — Z96.652 S/P REVISION OF TOTAL KNEE, LEFT: ICD-10-CM

## 2025-03-05 DIAGNOSIS — Z96.652 S/P REVISION OF TOTAL KNEE, LEFT: Primary | ICD-10-CM

## 2025-03-05 DIAGNOSIS — Z96.651 HISTORY OF TOTAL RIGHT KNEE REPLACEMENT: ICD-10-CM

## 2025-03-05 PROCEDURE — 1159F MED LIST DOCD IN RCRD: CPT | Mod: CPTII,,, | Performed by: NURSE PRACTITIONER

## 2025-03-05 PROCEDURE — 3079F DIAST BP 80-89 MM HG: CPT | Mod: CPTII,,, | Performed by: NURSE PRACTITIONER

## 2025-03-05 PROCEDURE — 99024 POSTOP FOLLOW-UP VISIT: CPT | Mod: ,,, | Performed by: NURSE PRACTITIONER

## 2025-03-05 PROCEDURE — 3075F SYST BP GE 130 - 139MM HG: CPT | Mod: CPTII,,, | Performed by: NURSE PRACTITIONER

## 2025-03-05 PROCEDURE — 73562 X-RAY EXAM OF KNEE 3: CPT | Mod: ,,, | Performed by: NURSE PRACTITIONER

## 2025-03-05 PROCEDURE — 4010F ACE/ARB THERAPY RXD/TAKEN: CPT | Mod: CPTII,,, | Performed by: NURSE PRACTITIONER

## 2025-03-05 NOTE — PROGRESS NOTES
Chief Complaint:   Chief Complaint   Patient presents with    Left Knee - Follow-up     7wk out f/u Lt TKA Revision I&D Sx 1/14/25 -GL 2/28/25 patient states it feels ok on today and mobic helps and tylenol. He goes to PT 3xs and going well he is working on muscle building.        History of present illness:  06/11/2024: Left total knee arthroplasty   12/10/2024: Right total knee arthroplasty   1/14/25: I&D Left Total Knee arthroplasty, poly exchange    He returns today. Both knees are doing well. Working with PT on quad strengthening. Mobic helping but wears off in the afternoons. Followed by ID     Musculoskeletal:   Standing exam  stance: normal alignment, no significant leg-length discrepancy  gait: normal     Knee examination  - General comments: unremarkable appearance    - Tenderness: none     Knee                  RIGHT    LEFT  Skin:                  Intact      Intact  ROM:                 0-130      0-120  Effusion:              +               +  MJL TTP:           Neg         Neg  LJL TTP:            Neg         Neg  Pat crep:            Neg         Neg  Patella TTPs:     Neg         Neg      N-V intact intact  Hip: nml nml    Lower extremity edema:Negative       Imaging: X-rays ordered and images interpreted today personally by me of 3 views of each knee show appropriate implant position       Assessment: S/P revision of total knee, left  -     Cancel: X-Ray Knee 3 View Left; Future; Expected date: 03/05/2025    History of total right knee replacement        Plan:  Doing well s/p above. Continue PT for strengthening. Follow up in 6 weeks for left knee recheck.

## 2025-03-23 ENCOUNTER — DOCUMENT SCAN (OUTPATIENT)
Dept: HOME HEALTH SERVICES | Facility: HOSPITAL | Age: 65
End: 2025-03-23
Payer: COMMERCIAL

## 2025-03-26 ENCOUNTER — OFFICE VISIT (OUTPATIENT)
Dept: INFECTIOUS DISEASES | Facility: CLINIC | Age: 65
End: 2025-03-26
Payer: COMMERCIAL

## 2025-03-26 ENCOUNTER — TELEPHONE (OUTPATIENT)
Dept: INTERNAL MEDICINE | Facility: CLINIC | Age: 65
End: 2025-03-26
Payer: COMMERCIAL

## 2025-03-26 VITALS
TEMPERATURE: 98 F | DIASTOLIC BLOOD PRESSURE: 85 MMHG | HEART RATE: 88 BPM | SYSTOLIC BLOOD PRESSURE: 145 MMHG | WEIGHT: 248 LBS | BODY MASS INDEX: 30.84 KG/M2 | OXYGEN SATURATION: 99 % | HEIGHT: 75 IN | RESPIRATION RATE: 18 BRPM

## 2025-03-26 DIAGNOSIS — A48.8 SERRATIA MARCESCENS INFECTION: Primary | ICD-10-CM

## 2025-03-26 PROCEDURE — 4010F ACE/ARB THERAPY RXD/TAKEN: CPT | Mod: CPTII,S$GLB,,

## 2025-03-26 PROCEDURE — 99213 OFFICE O/P EST LOW 20 MIN: CPT | Mod: S$GLB,,,

## 2025-03-26 PROCEDURE — 3008F BODY MASS INDEX DOCD: CPT | Mod: CPTII,S$GLB,,

## 2025-03-26 PROCEDURE — 99999 PR PBB SHADOW E&M-EST. PATIENT-LVL III: CPT | Mod: PBBFAC,,,

## 2025-03-26 PROCEDURE — 3079F DIAST BP 80-89 MM HG: CPT | Mod: CPTII,S$GLB,,

## 2025-03-26 PROCEDURE — 1101F PT FALLS ASSESS-DOCD LE1/YR: CPT | Mod: CPTII,S$GLB,,

## 2025-03-26 PROCEDURE — 1126F AMNT PAIN NOTED NONE PRSNT: CPT | Mod: CPTII,S$GLB,,

## 2025-03-26 PROCEDURE — 3077F SYST BP >= 140 MM HG: CPT | Mod: CPTII,S$GLB,,

## 2025-03-26 PROCEDURE — 3288F FALL RISK ASSESSMENT DOCD: CPT | Mod: CPTII,S$GLB,,

## 2025-03-26 NOTE — TELEPHONE ENCOUNTER
"----- Message from Nurse Felix sent at 3/25/2025  7:56 AM CDT -----  Regarding: Dr. Bass 04/02/2025 wellness 2:20pm  Are there any outstanding tasks in chart? No, but needs FASTING labs, TO BE DONE AT  "[facility]" or lab location of choice PRIOR to apptIs there any documentation of tasks? noHas the pt seen another physician, been to ER, UCC, or admitted to hospital since last visit?Has the pt done blood work or imaging since last visit?5. PLEASE HAVE PATIENT BRING MEDICATION LIST OR BOTTLES TO EVERY OFFICE VISIT  "

## 2025-04-01 ENCOUNTER — DOCUMENT SCAN (OUTPATIENT)
Dept: HOME HEALTH SERVICES | Facility: HOSPITAL | Age: 65
End: 2025-04-01
Payer: COMMERCIAL

## 2025-04-02 ENCOUNTER — OFFICE VISIT (OUTPATIENT)
Dept: INTERNAL MEDICINE | Facility: CLINIC | Age: 65
End: 2025-04-02
Payer: COMMERCIAL

## 2025-04-02 VITALS
DIASTOLIC BLOOD PRESSURE: 82 MMHG | HEART RATE: 87 BPM | BODY MASS INDEX: 31.83 KG/M2 | WEIGHT: 256 LBS | HEIGHT: 75 IN | SYSTOLIC BLOOD PRESSURE: 132 MMHG | OXYGEN SATURATION: 98 %

## 2025-04-02 DIAGNOSIS — I10 PRIMARY HYPERTENSION: ICD-10-CM

## 2025-04-02 DIAGNOSIS — T84.50XS INFECTION OF PROSTHETIC JOINT, SEQUELA: ICD-10-CM

## 2025-04-02 DIAGNOSIS — E78.2 MIXED HYPERLIPIDEMIA: ICD-10-CM

## 2025-04-02 DIAGNOSIS — Z00.00 ANNUAL PHYSICAL EXAM: Primary | ICD-10-CM

## 2025-04-02 PROCEDURE — 3079F DIAST BP 80-89 MM HG: CPT | Mod: CPTII,,, | Performed by: INTERNAL MEDICINE

## 2025-04-02 PROCEDURE — 4010F ACE/ARB THERAPY RXD/TAKEN: CPT | Mod: CPTII,,, | Performed by: INTERNAL MEDICINE

## 2025-04-02 PROCEDURE — 3288F FALL RISK ASSESSMENT DOCD: CPT | Mod: CPTII,,, | Performed by: INTERNAL MEDICINE

## 2025-04-02 PROCEDURE — 3075F SYST BP GE 130 - 139MM HG: CPT | Mod: CPTII,,, | Performed by: INTERNAL MEDICINE

## 2025-04-02 PROCEDURE — 1160F RVW MEDS BY RX/DR IN RCRD: CPT | Mod: CPTII,,, | Performed by: INTERNAL MEDICINE

## 2025-04-02 PROCEDURE — 3008F BODY MASS INDEX DOCD: CPT | Mod: CPTII,,, | Performed by: INTERNAL MEDICINE

## 2025-04-02 PROCEDURE — 1101F PT FALLS ASSESS-DOCD LE1/YR: CPT | Mod: CPTII,,, | Performed by: INTERNAL MEDICINE

## 2025-04-02 PROCEDURE — 99397 PER PM REEVAL EST PAT 65+ YR: CPT | Mod: ,,, | Performed by: INTERNAL MEDICINE

## 2025-04-02 PROCEDURE — 1159F MED LIST DOCD IN RCRD: CPT | Mod: CPTII,,, | Performed by: INTERNAL MEDICINE

## 2025-04-02 PROCEDURE — 1125F AMNT PAIN NOTED PAIN PRSNT: CPT | Mod: CPTII,,, | Performed by: INTERNAL MEDICINE

## 2025-04-02 NOTE — PROGRESS NOTES
Patient ID: Zachery Cordero is a 64 y.o. male.    Chief Complaint: Annual Exam (Wellness. )      HPI:   Patient presents here today for above reason.     Health status: good   Exercise:  walks    Diet: regular   Caffeine: in am   ETOH: no none    Tobacco use:  Colon Ca Screening: due at least every 10 years starting at 45  PSA: due yearly starting at 44 yo    The patient's Health Maintenance was reviewed and the following appears to be due at this time:   Health Maintenance Due   Topic Date Due    Hepatitis C Screening  Never done    Pneumococcal Vaccines (Age 50+) (2 of 2 - PCV) 11/20/2015    RSV Vaccine (Age 60+ and Pregnant patients) (1 - Risk 60-74 years 1-dose series) Never done    Abdominal Aortic Aneurysm Screening  Never done    PROSTATE-SPECIFIC ANTIGEN  02/22/2025        Past Medical History:  Past Medical History:   Diagnosis Date    Arthritis     Bladder stone     BPH (benign prostatic hyperplasia)     Does use hearing aid     Dry eye     ED (erectile dysfunction)     Enlarged prostate with urinary obstruction     Hearing loss     wears hearing aids    HLD (hyperlipidemia)     HTN (hypertension)     Hypothyroidism, unspecified     Nocturia     Obesity     Personal history of colonic polyps 06/20/2018    Colonoscopy Report    Unspecified sensorineural hearing loss     Urinary catheter in place     Uses walker      Past Surgical History:   Procedure Laterality Date    CATARACT EXTRACTION W/  INTRAOCULAR LENS IMPLANT Right     COLONOSCOPY  06/20/2018    COLONOSCOPY W/ POLYPECTOMY  06/27/2023    IRRIGATION AND DEBRIDEMENT OF LOWER EXTREMITY Left 1/14/2025    Procedure: IRRIGATION AND DEBRIDEMENT, LOWER EXTREMITY;  Surgeon: Pablito Abad Jr., MD;  Location: [facility];  Service: Orthopedics;  Laterality: Left;    KNEE ARTHROSCOPY W/ MENISCAL REPAIR Right     MOUTH SURGERY      salivary gland    REVISION OF KNEE ARTHROPLASTY Left 1/14/2025    Procedure: REVISION, ARTHROPLASTY, KNEE - poly;  Surgeon:  Pablito Abad Jr., MD;  Location: [facility] OR;  Service: Orthopedics;  Laterality: Left;    ROBOT-ASSISTED LAPAROSCOPIC SIMPLE PROSTATECTOMY USING DA LOUIE XI N/A 12/31/2024    Procedure: XI ROBOTIC PROSTATECTOMY, SIMPLE;  Surgeon: Kieran Townsend MD;  Location: [facility];  Service: Urology;  Laterality: N/A;  PLUS REMOVE BLADDER STONE    ROBOTIC ARTHROPLASTY, KNEE Left 06/11/2024    Procedure: SDD- ROBOTIC ARTHROPLASTY, KNEE, TOTAL;  Surgeon: Pablito Abad Jr., MD;  Location: [facility] OR;  Service: Orthopedics;  Laterality: Left;    ROBOTIC ARTHROPLASTY, KNEE Right 12/10/2024    Procedure: SDD - ROBOTIC ARTHROPLASTY, KNEE, TOTAL;  Surgeon: Pablito Abad Jr., MD;  Location: [facility] OR;  Service: Orthopedics;  Laterality: Right;     Review of patient's allergies indicates:  No Known Allergies  Medications Ordered Prior to Encounter[1]  Social History[2]  Family History   Problem Relation Name Age of Onset    Hypertension Mother      Hypertension Father Jaren Moseleyrodrigue     Brain aneurysm Father Jaren Cordero     Arthritis Father Jaren Cordero        ROS:   Constitutional: No weight gain, No fever, No chills, No fatigue.   Eyes: No blurring, No visual disturbances.   Ear/Nose/Mouth/Throat: No decreased hearing, No ear pain, No nasal congestion, No sore throat.   Respiratory: No shortness of breath, No cough, No wheezing.   Cardiovascular: No chest pain, No palpitations, No peripheral edema.   Gastrointestinal: No nausea, No vomiting, No diarrhea, No constipation, No abdominal pain.   Genitourinary: No dysuria, No hematuria.   Hematology/Lymphatics: No bruising tendency, No bleeding tendency, No swollen lymph glands.   Endocrine: No excessive thirst, No polyuria, No excessive hunger.   Musculoskeletal: No joint pain, No muscle pain, No decreased range of motion.   Integumentary: No rash, No pruritus.   Neurologic: No abnormal balance, No confusion, No headache.   Psychiatric: No anxiety, No depression, Not suicidal,  "No hallucinations.      Vitals/PE:   /82 (BP Location: Left arm, Patient Position: Sitting)   Pulse 87   Ht 6' 3" (1.905 m)   Wt 116.1 kg (256 lb)   SpO2 98%   BMI 32.00 kg/m²   Physical Exam  General: Alert and oriented, No acute distress.   Eye: Normal conjunctiva without exudate.  HENMT: Normocephalic/AT, Normal hearing, Oral mucosa is moist and pink   Neck: No goiter visualized.   Respiratory: Lungs CTAB, Respirations are non-labored, Breath sounds are equal, Symmetrical chest wall expansion.  Cardiovascular: Normal rate, Regular rhythm, No murmur, No edema.   Gastrointestinal: Non-distended.   Genitourinary: Deferred.  Musculoskeletal: Normal ROM, Normal gait, No deformities or amputations.  Integumentary: Warm, Dry, Intact. No diaphoresis, or flushing.  Neurologic: No focal deficits, Cranial Nerves II-XII are grossly intact.   Psychiatric: Cooperative, Appropriate mood & affect, Normal judgment, Non-suicidal.    Assessment/Plan:   1. Primary hypertension    2. Mixed hyperlipidemia    3. Infection of prosthetic joint, sequela         ..      ..No orders of the defined types were placed in this encounter.        I have discontinued Zachery ASHLEY Cordero's saw palmetto. I am also having him maintain his tadalafiL, testosterone cypionate, simvastatin, telmisartan, multivitamin, fish oil-omega-3 fatty acids, glucosamine-chondroitin, b complex vitamins, folic acid, ascorbic acid (vitamin C), NON FORMULARY MEDICATION, zinc gluconate, levothyroxine, NON FORMULARY MEDICATION, (prasterone, DHEA, (DHEA ORAL)), NOZIN NASAL , VEVYE, phenazopyridine, meloxicam, pantoprazole, and ciprofloxacin HCl.    No orders of the defined types were placed in this encounter.      Education and counseling done face to face regarding medical conditions and plan. Contact office if new symptoms develop. Should any symptoms ever significantly worsen seek emergency medical attention/go to ER. Follow up at least yearly for " wellness or sooner PRN. Nurse to call patient with any results. The patient is receptive, expresses understanding and is agreeable to plan. All questions have been answered.    No follow-ups on file.           [1]   Current Outpatient Medications on File Prior to Visit   Medication Sig Dispense Refill    ascorbic acid, vitamin C, (VITAMIN C) 500 MG tablet Take 500 mg by mouth once daily.      b complex vitamins tablet Take 1 tablet by mouth once daily.      ciprofloxacin HCl (CIPRO) 500 MG tablet Take 1 tablet (500 mg total) by mouth 2 (two) times daily. 1 PO TWICE DAILY FOR TRAVELERS DIARRHEA 60 tablet 1    cycloSPORINE (VEVYE) 0.1 % Drop Apply 1 drop to eye 2 (two) times a day.      folic acid (FOLVITE) 1 MG tablet Take 1 mg by mouth once daily.      glucosamine-chondroitin 500-400 mg tablet Take 1 tablet by mouth 2 (two) times a day.      levothyroxine (SYNTHROID) 50 MCG tablet TAKE 1 TABLET BY MOUTH DAILY 90 tablet 3    meloxicam (MOBIC) 15 MG tablet Take 1 tablet (15 mg total) by mouth once daily. 30 tablet 2    multivitamin (ONE DAILY MULTIVITAMIN) per tablet Take 1 tablet by mouth once daily.      NON FORMULARY MEDICATION Take 1 tablet by mouth every evening. L- argintine      NON FORMULARY MEDICATION Take 1 tablet by mouth once daily. Umary acid hyluonic      omega-3 fatty acids/fish oil (FISH OIL-OMEGA-3 FATTY ACIDS) 300-1,000 mg capsule Take by mouth once daily. 4 a day      prasterone, DHEA, (DHEA ORAL) Take 1 tablet by mouth every evening.      simvastatin (ZOCOR) 20 MG tablet TAKE 1 TABLET BY MOUTH EVERY DAY AT BEDTIME 90 tablet 3    tadalafiL (CIALIS) 20 MG Tab Take 20 mg by mouth once daily.      telmisartan (MICARDIS) 80 MG Tab TAKE 1 TABLET BY MOUTH DAILY 90 tablet 3    testosterone cypionate (DEPOTESTOTERONE CYPIONATE) 200 mg/mL injection Inject 1 mL (200 mg total) into the muscle every 14 (fourteen) days. 10 mL 4    zinc gluconate 50 mg tablet Take 50 mg by mouth every evening.      ethyl alcohoL  (NOZIN NASAL ) 62 % Kit Apply topically 2 (two) times a day.      pantoprazole (PROTONIX) 40 MG tablet Take 1 tablet (40 mg total) by mouth once daily. (Patient not taking: Reported on 3/26/2025) 30 tablet 0    phenazopyridine (PYRIDIUM) 100 MG tablet Take 100 mg by mouth 3 (three) times daily as needed for Pain.      [DISCONTINUED] aspirin 81 MG Chew Take 1 tablet (81 mg total) by mouth 2 (two) times a day. Blood clot prevention 84 tablet 0    [DISCONTINUED] ondansetron (ZOFRAN) 8 MG tablet Take 8 mg by mouth every 8 (eight) hours as needed for Nausea.      [DISCONTINUED] saw palmetto 500 MG capsule Take 500 mg by mouth 2 (two) times daily. (Patient not taking: Reported on 3/26/2025)      [DISCONTINUED] sodium chloride 0.9% (NORMAL SALINE FLUSH) injection Inject 10 mLs into the vein every 12 (twelve) hours as needed.       No current facility-administered medications on file prior to visit.   [2]   Social History  Socioeconomic History    Marital status:    Tobacco Use    Smoking status: Some Days     Types: Cigars     Passive exposure: Yes    Smokeless tobacco: Never    Tobacco comments:     1-2 cigars a month.   Substance and Sexual Activity    Alcohol use: Yes     Alcohol/week: 10.0 standard drinks of alcohol     Types: 10 Cans of beer per week     Comment: 1-2 a day and sometimes none    Drug use: Never    Sexual activity: Yes     Partners: Female     Birth control/protection: Post-menopausal     Social Drivers of Health     Financial Resource Strain: Low Risk  (2/11/2025)    Overall Financial Resource Strain (CARDIA)     Difficulty of Paying Living Expenses: Not hard at all   Food Insecurity: No Food Insecurity (2/11/2025)    Hunger Vital Sign     Worried About Running Out of Food in the Last Year: Never true     Ran Out of Food in the Last Year: Never true   Transportation Needs: No Transportation Needs (2/11/2025)    PRAPARE - Transportation     Lack of Transportation (Medical): No     Lack  of Transportation (Non-Medical): No   Physical Activity: Insufficiently Active (2/11/2025)    Exercise Vital Sign     Days of Exercise per Week: 2 days     Minutes of Exercise per Session: 20 min   Stress: No Stress Concern Present (2/11/2025)    [employer] - Occupational Stress Questionnaire     Feeling of Stress : Not at all   Housing Stability: Low Risk  (2/11/2025)    Housing Stability Vital Sign     Unable to Pay for Housing in the Last Year: No     Number of Times Moved in the Last Year: 0     Homeless in the Last Year: No

## 2025-04-03 DIAGNOSIS — I10 PRIMARY HYPERTENSION: ICD-10-CM

## 2025-04-03 RX ORDER — TELMISARTAN 80 MG/1
80 TABLET ORAL
Qty: 90 TABLET | Refills: 3 | Status: SHIPPED | OUTPATIENT
Start: 2025-04-03

## 2025-04-07 DIAGNOSIS — Z96.651 HISTORY OF TOTAL RIGHT KNEE REPLACEMENT: ICD-10-CM

## 2025-04-08 RX ORDER — MELOXICAM 15 MG/1
TABLET ORAL
Qty: 30 TABLET | Refills: 2 | Status: SHIPPED | OUTPATIENT
Start: 2025-04-08

## 2025-04-16 ENCOUNTER — OFFICE VISIT (OUTPATIENT)
Dept: ORTHOPEDICS | Facility: CLINIC | Age: 65
End: 2025-04-16
Payer: COMMERCIAL

## 2025-04-16 ENCOUNTER — HOSPITAL ENCOUNTER (OUTPATIENT)
Dept: RADIOLOGY | Facility: CLINIC | Age: 65
Discharge: HOME | End: 2025-04-16
Attending: ORTHOPAEDIC SURGERY
Payer: COMMERCIAL

## 2025-04-16 VITALS
HEART RATE: 72 BPM | BODY MASS INDEX: 30.96 KG/M2 | DIASTOLIC BLOOD PRESSURE: 95 MMHG | HEIGHT: 75 IN | WEIGHT: 249 LBS | SYSTOLIC BLOOD PRESSURE: 154 MMHG

## 2025-04-16 DIAGNOSIS — Z96.652 S/P REVISION OF TOTAL KNEE, LEFT: ICD-10-CM

## 2025-04-16 DIAGNOSIS — Z96.652 S/P REVISION OF TOTAL KNEE, LEFT: Primary | ICD-10-CM

## 2025-04-16 PROCEDURE — 73562 X-RAY EXAM OF KNEE 3: CPT | Mod: LT,,, | Performed by: ORTHOPAEDIC SURGERY

## 2025-04-16 NOTE — PROGRESS NOTES
Chief Complaint:   Chief Complaint   Patient presents with    Follow-up     3 month F/U LT TKA revision I&D - sx 01/14/25 - Patient reports pain after a day of movement, worsens after a day of rougher activity. Reports knee is cold to touch. Taking Robaxin daily for pain with relief, takings Tylenol before bed for pain with relief. No further complaints.       Consulting Physician: No ref. provider found    History of present illness:  06/11/2024: Left total knee arthroplasty   12/10/2024: Right total knee arthroplasty   1/14/25: I&D Left Total Knee arthroplasty, poly exchange    He returns today.  His knee pain is improved.  He has finished up his antibiotics.  He does still use the Mobic.  He is also using Tylenol in the evenings.  He is back to cutting grass and cutting down trees.    Past Medical History:   Diagnosis Date    Arthritis     Bladder stone     BPH (benign prostatic hyperplasia)     Does use hearing aid     Dry eye     ED (erectile dysfunction)     Enlarged prostate with urinary obstruction     Hearing loss     wears hearing aids    HLD (hyperlipidemia)     HTN (hypertension)     Hypothyroidism, unspecified     Nocturia     Obesity     Personal history of colonic polyps 06/20/2018    Colonoscopy Report    Unspecified sensorineural hearing loss     Urinary catheter in place     Uses walker        Past Surgical History:   Procedure Laterality Date    CATARACT EXTRACTION W/  INTRAOCULAR LENS IMPLANT Right     COLONOSCOPY  06/20/2018    COLONOSCOPY W/ POLYPECTOMY  06/27/2023    IRRIGATION AND DEBRIDEMENT OF LOWER EXTREMITY Left 1/14/2025    Procedure: IRRIGATION AND DEBRIDEMENT, LOWER EXTREMITY;  Surgeon: Pablito Abad Jr., MD;  Location: [facility];  Service: Orthopedics;  Laterality: Left;    KNEE ARTHROSCOPY W/ MENISCAL REPAIR Right     MOUTH SURGERY      salivary gland    REVISION OF KNEE ARTHROPLASTY Left 1/14/2025    Procedure: REVISION, ARTHROPLASTY, KNEE - poly;  Surgeon: Pablito Abad Jr.,  MD;  Location: [facility] OR;  Service: Orthopedics;  Laterality: Left;    ROBOT-ASSISTED LAPAROSCOPIC SIMPLE PROSTATECTOMY USING DA LOUIE XI N/A 12/31/2024    Procedure: XI ROBOTIC PROSTATECTOMY, SIMPLE;  Surgeon: Kieran Townsend MD;  Location: [facility];  Service: Urology;  Laterality: N/A;  PLUS REMOVE BLADDER STONE    ROBOTIC ARTHROPLASTY, KNEE Left 06/11/2024    Procedure: SDD- ROBOTIC ARTHROPLASTY, KNEE, TOTAL;  Surgeon: Pablito Abad Jr., MD;  Location: [facility] OR;  Service: Orthopedics;  Laterality: Left;    ROBOTIC ARTHROPLASTY, KNEE Right 12/10/2024    Procedure: SDD - ROBOTIC ARTHROPLASTY, KNEE, TOTAL;  Surgeon: Pablito Abad Jr., MD;  Location: [facility] OR;  Service: Orthopedics;  Laterality: Right;       Current Outpatient Medications   Medication Sig    ascorbic acid, vitamin C, (VITAMIN C) 500 MG tablet Take 500 mg by mouth once daily.    b complex vitamins tablet Take 1 tablet by mouth once daily.    cycloSPORINE (VEVYE) 0.1 % Drop Apply 1 drop to eye 2 (two) times a day.    folic acid (FOLVITE) 1 MG tablet Take 1 mg by mouth once daily.    glucosamine-chondroitin 500-400 mg tablet Take 1 tablet by mouth 2 (two) times a day.    levothyroxine (SYNTHROID) 50 MCG tablet TAKE 1 TABLET BY MOUTH DAILY    meloxicam (MOBIC) 15 MG tablet TAKE 1 TABLET(15 MG) BY MOUTH DAILY    multivitamin (ONE DAILY MULTIVITAMIN) per tablet Take 1 tablet by mouth once daily.    NON FORMULARY MEDICATION Take 1 tablet by mouth every evening. L- argintine    NON FORMULARY MEDICATION Take 1 tablet by mouth once daily. Umary acid hyluonic    omega-3 fatty acids/fish oil (FISH OIL-OMEGA-3 FATTY ACIDS) 300-1,000 mg capsule Take by mouth once daily. 4 a day    prasterone, DHEA, (DHEA ORAL) Take 1 tablet by mouth every evening.    simvastatin (ZOCOR) 20 MG tablet TAKE 1 TABLET BY MOUTH EVERY DAY AT BEDTIME    tadalafiL (CIALIS) 20 MG Tab Take 20 mg by mouth once daily.    telmisartan (MICARDIS) 80 MG Tab TAKE 1 TABLET BY MOUTH DAILY     "testosterone cypionate (DEPOTESTOTERONE CYPIONATE) 200 mg/mL injection Inject 1 mL (200 mg total) into the muscle every 14 (fourteen) days.    zinc gluconate 50 mg tablet Take 50 mg by mouth every evening.     No current facility-administered medications for this visit.       Review of patient's allergies indicates:  No Known Allergies    Family History   Problem Relation Name Age of Onset    Hypertension Mother      Hypertension Father Jaren Cordero     Brain aneurysm Father Jaren Cordero     Arthritis Father Jaren Cordero        Social History[1]    Review of Systems:    Constitution:   Denies chills, fever, and sweats.  HENT:   Denies headaches or blurry vision.  Cardiovascular:  Denies chest pain or irregular heart beat.  Respiratory:   Denies cough or shortness of breath.  Gastrointestinal:  Denies abdominal pain, nausea, or vomiting.  Musculoskeletal:   Denies muscle cramps.  Neurological:   Denies dizziness or focal weakness.  Psychiatric/Behavior: Normal mental status.  Hematology/Lymph:  Denies bleeding problem or easy bruising/bleeding.  Skin:    Denies rash or suspicious lesions.    Examination:    Vital Signs:    Vitals:    04/16/25 0850 04/16/25 0914   BP: (!) 159/104 (!) 154/95   Pulse: 66 72   Weight: 112.9 kg (249 lb)    Height: 6' 3" (1.905 m)        Body mass index is 31.12 kg/m².    Constitution:   Well-developed, well nourished patient in no acute distress.  Neurological:   Alert and oriented x 3 and cooperative to examination.     Psychiatric/Behavior: Normal mental status.  Respiratory:   No shortness of breath.  Cards:   Pulses palpable, brisk cap refill  Eyes:    Extraoccular muscles intact  Skin:    No scars, rash or suspicious lesions.    MSK:   Standing exam  stance: normal alignment, no significant leg-length discrepancy  gait: mild antalgic    Knee examination  - General comments: unremarkable appearance    - Tenderness:  None    Knee                  RIGHT    LEFT  Skin:     "              Intact      Intact  ROM:                 0-130      0-130  Effusion:             Neg        +  MJL TTP:           Neg         Neg  LJL TTP:            Neg         Neg  David:         Neg         Neg  Pat crep:            Neg         Neg  Patella TTPs:     Neg         Neg  Patella grind:      Neg        Neg  Lachman:           Neg        Neg  Pivot shift:          Neg        Neg  Valgus stress:    Neg        Neg  Varus stress:      Neg        Neg  Posterior drawer: Neg       Neg    N-V intact intact  Hip: nml nml    Lower extremity edema:Negative     Imaging: X-rays ordered and images interpreted today personally by me of three views left knee show appropriate implant position      Assessment: S/P revision of total knee, left  -     X-Ray Knee 3 View Left; Future; Expected date: 04/16/2025        Plan:  Doing well status post above.  Continue strengthening.  Activities as tolerated.  I will see him back in 6-9 month with radiographs of bilateral knees               [1]   Social History  Socioeconomic History    Marital status:    Tobacco Use    Smoking status: Some Days     Types: Cigars     Passive exposure: Yes    Smokeless tobacco: Never    Tobacco comments:     1 cigars a month.   Substance and Sexual Activity    Alcohol use: Yes     Alcohol/week: 10.0 standard drinks of alcohol     Types: 10 Cans of beer per week     Comment: 1-2 a day and sometimes none    Drug use: Never    Sexual activity: Yes     Partners: Female     Birth control/protection: Post-menopausal     Social Drivers of Health     Financial Resource Strain: Low Risk  (2/11/2025)    Overall Financial Resource Strain (CARDIA)     Difficulty of Paying Living Expenses: Not hard at all   Food Insecurity: No Food Insecurity (2/11/2025)    Hunger Vital Sign     Worried About Running Out of Food in the Last Year: Never true     Ran Out of Food in the Last Year: Never true   Transportation Needs: No Transportation Needs (2/11/2025)     PRAPARE - Transportation     Lack of Transportation (Medical): No     Lack of Transportation (Non-Medical): No   Physical Activity: Insufficiently Active (2/11/2025)    Exercise Vital Sign     Days of Exercise per Week: 2 days     Minutes of Exercise per Session: 20 min   Stress: No Stress Concern Present (2/11/2025)    [employer] - Occupational Stress Questionnaire     Feeling of Stress : Not at all   Housing Stability: Low Risk  (2/11/2025)    Housing Stability Vital Sign     Unable to Pay for Housing in the Last Year: No     Number of Times Moved in the Last Year: 0     Homeless in the Last Year: No

## 2025-04-30 ENCOUNTER — DOCUMENT SCAN (OUTPATIENT)
Dept: HOME HEALTH SERVICES | Facility: HOSPITAL | Age: 65
End: 2025-04-30
Payer: COMMERCIAL

## 2025-05-07 ENCOUNTER — OFFICE VISIT (OUTPATIENT)
Dept: INFECTIOUS DISEASES | Facility: CLINIC | Age: 65
End: 2025-05-07
Payer: COMMERCIAL

## 2025-05-07 DIAGNOSIS — A48.8 SERRATIA MARCESCENS INFECTION: ICD-10-CM

## 2025-05-07 DIAGNOSIS — T84.50XS INFECTION OF PROSTHETIC JOINT, SEQUELA: Primary | ICD-10-CM

## 2025-05-07 DIAGNOSIS — Z96.652 S/P REVISION OF TOTAL KNEE, LEFT: ICD-10-CM

## 2025-06-06 PROBLEM — Z79.2 RECEIVING INTRAVENOUS ANTIBIOTIC TREATMENT AS OUTPATIENT: Status: RESOLVED | Noted: 2025-02-26 | Resolved: 2025-06-06

## 2025-07-14 DIAGNOSIS — Z96.651 HISTORY OF TOTAL RIGHT KNEE REPLACEMENT: ICD-10-CM

## 2025-07-14 RX ORDER — MELOXICAM 15 MG/1
TABLET ORAL
Qty: 30 TABLET | Refills: 2 | Status: SHIPPED | OUTPATIENT
Start: 2025-07-14

## 2025-07-16 DIAGNOSIS — Z96.652 S/P REVISION OF TOTAL KNEE, LEFT: Primary | ICD-10-CM

## 2025-07-16 RX ORDER — CIPROFLOXACIN 500 MG/1
500 TABLET, FILM COATED ORAL EVERY 12 HOURS
COMMUNITY
End: 2025-07-16 | Stop reason: SDUPTHER

## 2025-07-16 RX ORDER — CIPROFLOXACIN 500 MG/1
500 TABLET, FILM COATED ORAL EVERY 12 HOURS
Qty: 14 TABLET | Refills: 0 | Status: SHIPPED | OUTPATIENT
Start: 2025-07-16

## 2025-07-30 DIAGNOSIS — Z96.652 S/P REVISION OF TOTAL KNEE, LEFT: Primary | ICD-10-CM

## 2025-07-30 RX ORDER — CIPROFLOXACIN 500 MG/1
500 TABLET, FILM COATED ORAL EVERY 12 HOURS
Qty: 28 TABLET | Refills: 0 | Status: SHIPPED | OUTPATIENT
Start: 2025-07-30 | End: 2025-08-13

## 2025-08-01 ENCOUNTER — ANESTHESIA EVENT (OUTPATIENT)
Dept: SURGERY | Facility: HOSPITAL | Age: 65
End: 2025-08-01
Payer: COMMERCIAL

## 2025-08-01 DIAGNOSIS — E06.3 HYPOTHYROIDISM DUE TO HASHIMOTO'S THYROIDITIS: ICD-10-CM

## 2025-08-01 RX ORDER — LEVOTHYROXINE SODIUM 50 UG/1
50 TABLET ORAL
Qty: 90 TABLET | Refills: 3 | Status: SHIPPED | OUTPATIENT
Start: 2025-08-01

## 2025-08-05 ENCOUNTER — HOSPITAL ENCOUNTER (OUTPATIENT)
Facility: HOSPITAL | Age: 65
Discharge: HOME | End: 2025-08-05
Attending: UROLOGY | Admitting: UROLOGY
Payer: COMMERCIAL

## 2025-08-05 ENCOUNTER — ANESTHESIA (OUTPATIENT)
Dept: SURGERY | Facility: HOSPITAL | Age: 65
End: 2025-08-05
Payer: COMMERCIAL

## 2025-08-05 DIAGNOSIS — Z01.818 PREOP EXAMINATION: ICD-10-CM

## 2025-08-05 DIAGNOSIS — N21.0 BLADDER STONE: Primary | ICD-10-CM

## 2025-08-05 LAB
OHS QRS DURATION: 138 MS
OHS QTC CALCULATION: 457 MS

## 2025-08-05 PROCEDURE — 71000015 HC POSTOP RECOV 1ST HR: Performed by: UROLOGY

## 2025-08-05 PROCEDURE — 25000003 PHARM REV CODE 250

## 2025-08-05 PROCEDURE — 63600175 PHARM REV CODE 636 W HCPCS: Performed by: ANESTHESIOLOGY

## 2025-08-05 PROCEDURE — 36000707: Performed by: UROLOGY

## 2025-08-05 PROCEDURE — 37000009 HC ANESTHESIA EA ADD 15 MINS: Performed by: UROLOGY

## 2025-08-05 PROCEDURE — 71000016 HC POSTOP RECOV ADDL HR: Performed by: UROLOGY

## 2025-08-05 PROCEDURE — 71000033 HC RECOVERY, INTIAL HOUR: Performed by: UROLOGY

## 2025-08-05 PROCEDURE — 93010 ELECTROCARDIOGRAM REPORT: CPT | Mod: ,,, | Performed by: INTERNAL MEDICINE

## 2025-08-05 PROCEDURE — 25000003 PHARM REV CODE 250: Performed by: ANESTHESIOLOGY

## 2025-08-05 PROCEDURE — 27201423 OPTIME MED/SURG SUP & DEVICES STERILE SUPPLY: Performed by: UROLOGY

## 2025-08-05 PROCEDURE — 63600175 PHARM REV CODE 636 W HCPCS: Mod: JW,TB

## 2025-08-05 PROCEDURE — 36000706: Performed by: UROLOGY

## 2025-08-05 PROCEDURE — 93005 ELECTROCARDIOGRAM TRACING: CPT

## 2025-08-05 PROCEDURE — 37000008 HC ANESTHESIA 1ST 15 MINUTES: Performed by: UROLOGY

## 2025-08-05 PROCEDURE — 63600175 PHARM REV CODE 636 W HCPCS: Performed by: UROLOGY

## 2025-08-05 RX ORDER — HYDROCODONE BITARTRATE AND ACETAMINOPHEN 5; 325 MG/1; MG/1
1 TABLET ORAL
Status: DISCONTINUED | OUTPATIENT
Start: 2025-08-05 | End: 2025-08-05 | Stop reason: HOSPADM

## 2025-08-05 RX ORDER — SODIUM CHLORIDE 9 MG/ML
INJECTION, SOLUTION INTRAVENOUS CONTINUOUS
Status: DISCONTINUED | OUTPATIENT
Start: 2025-08-05 | End: 2025-08-05 | Stop reason: HOSPADM

## 2025-08-05 RX ORDER — MIDAZOLAM HYDROCHLORIDE 1 MG/ML
INJECTION INTRAMUSCULAR; INTRAVENOUS
Status: DISCONTINUED | OUTPATIENT
Start: 2025-08-05 | End: 2025-08-05

## 2025-08-05 RX ORDER — PROPOFOL 10 MG/ML
INJECTION, EMULSION INTRAVENOUS
Status: DISCONTINUED | OUTPATIENT
Start: 2025-08-05 | End: 2025-08-05

## 2025-08-05 RX ORDER — METOCLOPRAMIDE HYDROCHLORIDE 5 MG/ML
10 INJECTION INTRAMUSCULAR; INTRAVENOUS ONCE
Status: COMPLETED | OUTPATIENT
Start: 2025-08-05 | End: 2025-08-05

## 2025-08-05 RX ORDER — CEFAZOLIN SODIUM 2 G/50ML
2 SOLUTION INTRAVENOUS
Status: DISCONTINUED | OUTPATIENT
Start: 2025-08-05 | End: 2025-08-05 | Stop reason: HOSPADM

## 2025-08-05 RX ORDER — HYDROMORPHONE HYDROCHLORIDE 2 MG/ML
0.2 INJECTION, SOLUTION INTRAMUSCULAR; INTRAVENOUS; SUBCUTANEOUS EVERY 5 MIN PRN
Status: DISCONTINUED | OUTPATIENT
Start: 2025-08-05 | End: 2025-08-05 | Stop reason: HOSPADM

## 2025-08-05 RX ORDER — FAMOTIDINE 10 MG/ML
20 INJECTION, SOLUTION INTRAVENOUS ONCE
Status: COMPLETED | OUTPATIENT
Start: 2025-08-05 | End: 2025-08-05

## 2025-08-05 RX ORDER — MEPERIDINE HYDROCHLORIDE 25 MG/ML
12.5 INJECTION INTRAMUSCULAR; INTRAVENOUS; SUBCUTANEOUS ONCE
Status: DISCONTINUED | OUTPATIENT
Start: 2025-08-05 | End: 2025-08-05 | Stop reason: HOSPADM

## 2025-08-05 RX ORDER — HYDROMORPHONE HYDROCHLORIDE 2 MG/ML
INJECTION, SOLUTION INTRAMUSCULAR; INTRAVENOUS; SUBCUTANEOUS
Status: DISCONTINUED | OUTPATIENT
Start: 2025-08-05 | End: 2025-08-05

## 2025-08-05 RX ORDER — MIDAZOLAM HYDROCHLORIDE 2 MG/2ML
2 INJECTION, SOLUTION INTRAMUSCULAR; INTRAVENOUS ONCE AS NEEDED
Status: DISCONTINUED | OUTPATIENT
Start: 2025-08-05 | End: 2025-08-05 | Stop reason: HOSPADM

## 2025-08-05 RX ORDER — METOCLOPRAMIDE HYDROCHLORIDE 5 MG/ML
10 INJECTION INTRAMUSCULAR; INTRAVENOUS EVERY 10 MIN PRN
Status: DISCONTINUED | OUTPATIENT
Start: 2025-08-05 | End: 2025-08-05 | Stop reason: HOSPADM

## 2025-08-05 RX ORDER — DIPHENHYDRAMINE HYDROCHLORIDE 50 MG/ML
25 INJECTION, SOLUTION INTRAMUSCULAR; INTRAVENOUS EVERY 6 HOURS PRN
Status: DISCONTINUED | OUTPATIENT
Start: 2025-08-05 | End: 2025-08-05 | Stop reason: HOSPADM

## 2025-08-05 RX ORDER — ONDANSETRON HYDROCHLORIDE 2 MG/ML
4 INJECTION, SOLUTION INTRAVENOUS ONCE
Status: DISCONTINUED | OUTPATIENT
Start: 2025-08-05 | End: 2025-08-05 | Stop reason: HOSPADM

## 2025-08-05 RX ORDER — FENTANYL CITRATE 50 UG/ML
INJECTION, SOLUTION INTRAMUSCULAR; INTRAVENOUS
Status: DISCONTINUED | OUTPATIENT
Start: 2025-08-05 | End: 2025-08-05

## 2025-08-05 RX ORDER — EPHEDRINE SULFATE 50 MG/ML
INJECTION, SOLUTION INTRAVENOUS
Status: DISCONTINUED | OUTPATIENT
Start: 2025-08-05 | End: 2025-08-05

## 2025-08-05 RX ORDER — HYDROMORPHONE HYDROCHLORIDE 2 MG/ML
0.5 INJECTION, SOLUTION INTRAMUSCULAR; INTRAVENOUS; SUBCUTANEOUS EVERY 5 MIN PRN
Status: DISCONTINUED | OUTPATIENT
Start: 2025-08-05 | End: 2025-08-05 | Stop reason: HOSPADM

## 2025-08-05 RX ORDER — LIDOCAINE HYDROCHLORIDE 10 MG/ML
1 INJECTION, SOLUTION EPIDURAL; INFILTRATION; INTRACAUDAL; PERINEURAL ONCE
Status: DISCONTINUED | OUTPATIENT
Start: 2025-08-05 | End: 2025-08-05 | Stop reason: HOSPADM

## 2025-08-05 RX ORDER — GLYCOPYRROLATE 0.2 MG/ML
INJECTION INTRAMUSCULAR; INTRAVENOUS
Status: DISCONTINUED | OUTPATIENT
Start: 2025-08-05 | End: 2025-08-05

## 2025-08-05 RX ORDER — IPRATROPIUM BROMIDE AND ALBUTEROL SULFATE 2.5; .5 MG/3ML; MG/3ML
3 SOLUTION RESPIRATORY (INHALATION) ONCE AS NEEDED
Status: DISCONTINUED | OUTPATIENT
Start: 2025-08-05 | End: 2025-08-05 | Stop reason: HOSPADM

## 2025-08-05 RX ORDER — DEXAMETHASONE SODIUM PHOSPHATE 4 MG/ML
INJECTION, SOLUTION INTRA-ARTICULAR; INTRALESIONAL; INTRAMUSCULAR; INTRAVENOUS; SOFT TISSUE
Status: DISCONTINUED | OUTPATIENT
Start: 2025-08-05 | End: 2025-08-05

## 2025-08-05 RX ORDER — PHENYLEPHRINE HCL IN 0.9% NACL 1 MG/10 ML
SYRINGE (ML) INTRAVENOUS
Status: DISCONTINUED | OUTPATIENT
Start: 2025-08-05 | End: 2025-08-05

## 2025-08-05 RX ORDER — ACETAMINOPHEN 500 MG
1000 TABLET ORAL ONCE
Status: COMPLETED | OUTPATIENT
Start: 2025-08-05 | End: 2025-08-05

## 2025-08-05 RX ORDER — LIDOCAINE HYDROCHLORIDE 20 MG/ML
INJECTION INTRAVENOUS
Status: DISCONTINUED | OUTPATIENT
Start: 2025-08-05 | End: 2025-08-05

## 2025-08-05 RX ORDER — ONDANSETRON HYDROCHLORIDE 2 MG/ML
INJECTION, SOLUTION INTRAVENOUS
Status: DISCONTINUED | OUTPATIENT
Start: 2025-08-05 | End: 2025-08-05

## 2025-08-05 RX ADMIN — Medication 100 MCG: at 01:08

## 2025-08-05 RX ADMIN — METOCLOPRAMIDE 10 MG: 5 INJECTION, SOLUTION INTRAMUSCULAR; INTRAVENOUS at 11:08

## 2025-08-05 RX ADMIN — MIDAZOLAM HYDROCHLORIDE 2 MG: 1 INJECTION, SOLUTION INTRAMUSCULAR; INTRAVENOUS at 12:08

## 2025-08-05 RX ADMIN — FENTANYL CITRATE 50 MCG: 50 INJECTION, SOLUTION INTRAMUSCULAR; INTRAVENOUS at 12:08

## 2025-08-05 RX ADMIN — ACETAMINOPHEN 1000 MG: 500 TABLET ORAL at 11:08

## 2025-08-05 RX ADMIN — PROPOFOL 30 MG: 10 INJECTION, EMULSION INTRAVENOUS at 01:08

## 2025-08-05 RX ADMIN — EPHEDRINE SULFATE 10 MG: 50 INJECTION INTRAVENOUS at 01:08

## 2025-08-05 RX ADMIN — GLYCOPYRROLATE 0.1 MG: 0.2 INJECTION INTRAMUSCULAR; INTRAVENOUS at 01:08

## 2025-08-05 RX ADMIN — EPHEDRINE SULFATE 5 MG: 50 INJECTION INTRAVENOUS at 01:08

## 2025-08-05 RX ADMIN — HYDROMORPHONE HYDROCHLORIDE 0.4 MG: 2 INJECTION, SOLUTION INTRAMUSCULAR; INTRAVENOUS; SUBCUTANEOUS at 01:08

## 2025-08-05 RX ADMIN — Medication 100 MCG: at 12:08

## 2025-08-05 RX ADMIN — CEFAZOLIN SODIUM 2 G: 2 SOLUTION INTRAVENOUS at 12:08

## 2025-08-05 RX ADMIN — DEXAMETHASONE SODIUM PHOSPHATE 4 MG: 4 INJECTION, SOLUTION INTRA-ARTICULAR; INTRALESIONAL; INTRAMUSCULAR; INTRAVENOUS; SOFT TISSUE at 12:08

## 2025-08-05 RX ADMIN — SODIUM CHLORIDE, SODIUM GLUCONATE, SODIUM ACETATE, POTASSIUM CHLORIDE AND MAGNESIUM CHLORIDE: 526; 502; 368; 37; 30 INJECTION, SOLUTION INTRAVENOUS at 12:08

## 2025-08-05 RX ADMIN — LIDOCAINE HYDROCHLORIDE 40 MG: 20 INJECTION INTRAVENOUS at 12:08

## 2025-08-05 RX ADMIN — ONDANSETRON 4 MG: 2 INJECTION INTRAMUSCULAR; INTRAVENOUS at 12:08

## 2025-08-05 RX ADMIN — FAMOTIDINE 20 MG: 10 INJECTION, SOLUTION INTRAVENOUS at 11:08

## 2025-08-05 RX ADMIN — HYDROMORPHONE HYDROCHLORIDE 0.2 MG: 2 INJECTION, SOLUTION INTRAMUSCULAR; INTRAVENOUS; SUBCUTANEOUS at 01:08

## 2025-08-05 RX ADMIN — PROPOFOL 200 MG: 10 INJECTION, EMULSION INTRAVENOUS at 12:08

## 2025-08-05 NOTE — TRANSFER OF CARE
"Anesthesia Transfer of Care Note    Patient: Zachery Cordero    Procedure(s) Performed: Procedure(s) (LRB):  CYSTOLITHOLAPAXY (N/A)    Patient location: PACU    Anesthesia Type: general    Transport from OR: Transported from OR on room air with adequate spontaneous ventilation    Post pain: adequate analgesia    Post assessment: no apparent anesthetic complications    Post vital signs: stable    Level of consciousness: responds to stimulation    Nausea/Vomiting: no nausea/vomiting    Complications: none    Transfer of care protocol was followed      Last vitals: Visit Vitals  /88   Pulse 93   Temp 37.2 °C (99 °F) (Oral)   Resp 20   Ht 6' 3.5" (1.918 m)   Wt 115.7 kg (255 lb)   SpO2 (!) 94%   BMI 31.45 kg/m²     "

## 2025-08-05 NOTE — ANESTHESIA PREPROCEDURE EVALUATION
08/05/2025  Zachery Cordero is a 65 y.o., male w/ h/o bladder stone presents for cystolithopaxy.    Other Medical History   HLD (hyperlipidemia) HTN (hypertension)   Hypothyroidism, unspecified Unspecified sensorineural hearing loss   Personal history of colonic polyps Hearing loss   ED (erectile dysfunction) Arthritis   Dry eye Enlarged prostate with urinary obstruction   Bladder stone Nocturia   Urinary catheter in place Obesity   Does use hearing aid BPH (benign prostatic hyperplasia)     Surgical History    COLONOSCOPY COLONOSCOPY W/ POLYPECTOMY   KNEE ARTHROSCOPY W/ MENISCAL REPAIR MOUTH SURGERY   ROBOTIC ARTHROPLASTY, KNEE CATARACT EXTRACTION W/  INTRAOCULAR LENS IMPLANT   ROBOTIC ARTHROPLASTY, KNEE ROBOT-ASSISTED LAPAROSCOPIC SIMPLE PROSTATECTOMY USING DA LOUIE XI   IRRIGATION AND DEBRIDEMENT OF LOWER EXTREMITY REVISION OF KNEE ARTHROPLASTY     Problem List  Current as of 08/05/25 1027  Annual physical exam Benign neoplasm of ascending colon   Diverticulosis of large intestine without perforation or abscess without bleeding Dvrtclos of lg int w/o perforation or abscess w/o bleeding   Family history of colon cancer High prostate specific antigen (PSA)   Hypercalcemia Hyperlipidemia   Hypothyroidism due to Hashimoto's thyroiditis Low testosterone in male   Peripheral vascular disease, unspecified Personal history of colonic polyps   Pre-op exam Primary hypertension   Primary osteoarthritis of left knee Primary osteoarthritis of right knee   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     I have reviewed the  Nursing Notes. I have reviewed the NPO Status.   I have reviewed the Medications.     Review of Systems  Anesthesia Hx:  No problems with previous Anesthesia                Social:  Non-Smoker       Cardiovascular:     Hypertension          PVD hyperlipidemia                               Musculoskeletal:  Arthritis               Endocrine:   Hypothyroidism        Obesity / BMI > 30      Physical Exam  General: Well nourished, Cooperative, Alert and Oriented    Airway:  Mallampati: II   Mouth Opening: Normal  TM Distance: Normal  Tongue: Normal  Neck ROM: Normal ROM    Dental:  Intact    Chest/Lungs:  Clear to auscultation, Normal Respiratory Rate    Heart:  Rate: Normal  Rhythm: Regular Rhythm  Sounds: Normal    Abdomen:  Normal, Soft, Nontender        Anesthesia Plan  Type of Anesthesia, risks & benefits discussed:    Anesthesia Type: Gen Supraglottic Airway  Intra-op Monitoring Plan: Standard ASA Monitors  Post Op Pain Control Plan: multimodal analgesia  Induction:  IV  Airway Plan: Direct  Informed Consent: Informed consent signed with the Patient and all parties understand the risks and agree with anesthesia plan.  All questions answered.   ASA Score: 3  Day of Surgery Review of History & Physical: H&P Update referred to the surgeon/provider.    Ready For Surgery From Anesthesia Perspective.     .

## 2025-08-05 NOTE — ANESTHESIA POSTPROCEDURE EVALUATION
Anesthesia Post Evaluation    Patient: Zachery Cordero    Procedure(s) Performed: Procedure(s) (LRB):  CYSTOLITHOLAPAXY (N/A)    Final Anesthesia Type: general      Patient location during evaluation: PACU  Patient participation: Yes- Able to Participate  Level of consciousness: awake and alert  Post-procedure vital signs: reviewed and stable  Pain management: adequate  Airway patency: patent  GENE mitigation strategies: Multimodal analgesia  PONV status at discharge: No PONV  Anesthetic complications: no      Cardiovascular status: hemodynamically stable  Respiratory status: unassisted  Hydration status: euvolemic  Follow-up not needed.  Comments: PT. Appears to have adequately recovered from Anesthetic. VSS, airway patient. No apparent complications noted.              Vitals Value Taken Time   /74 08/05/25 14:11   Temp 36.1 °C (97 °F) 08/05/25 13:55   Pulse 92 08/05/25 14:12   Resp 13 08/05/25 14:12   SpO2 98 % 08/05/25 14:12   Vitals shown include unfiled device data.      No case tracking events are documented in the log.      Pain/Sheree Score: Pain Rating Prior to Med Admin: 0 (8/5/2025 11:27 AM)  Sheree Score: 4 (8/5/2025  1:55 PM)

## 2025-08-05 NOTE — DISCHARGE SUMMARY
[facility]  [facility]  Short Stay    Procedure(s) (LRB):  CYSTOLITHOLAPAXY (N/A)      OUTCOME: Patient tolerated treatment/procedure well without complication and is now ready for discharge.    DISPOSITION: Home or Self Care    FINAL DIAGNOSIS:  Bladder stone    FOLLOWUP: In clinic    DISCHARGE INSTRUCTIONS:    Discharge Procedure Orders   Diet Adult Regular     Lifting restrictions     Notify your health care provider if you experience any of the following:  temperature >100.4     Notify your health care provider if you experience any of the following:  persistent nausea and vomiting or diarrhea     Notify your health care provider if you experience any of the following:  severe uncontrolled pain         Clinical Reference Documents Added to Patient Instructions         Document    CYSTOLITHOLAPAXY (ENGLISH)    HOW TO CARE FOR A URINARY CATHETER (ENGLISH)    HOW TO REMOVE A DILLARD CATHETER (ENGLISH)            TIME SPENT ON DISCHARGE: 15 minutes

## 2025-08-05 NOTE — OP NOTE
PREOPERATIVE DIAGNOSIS(ES):  Bladder calculus.  BPH    POSTOPERATIVE DIAGNOSIS(ES):  Bladder calculus.  BPH    PROCEDURE:  Cystolithalopaxy, > than 2.5 cm.  TURP    SURGEON:  Kieran Townsend MD    ANESTHESIA:  General.    FLUIDS:  750mL.    ESTIMATED BLOOD LOSS:  10 mL.    SPECIMENS:  Bladder stone fragments.  Prostate chips    FINDINGS:  PT had a prior Suprapubic prostatectomy; overlying the prostate fossa/ resection site from his previous surgery the patient developed bladder stones which were attached to prostate mucosa. He aslo had stones develop on the bladder incision site.    The prostate fossa was wide open and well resected but the stone burden was causing obstruction.    There were innumerable stones attached the prostate fossa which were laser and then removed with a loop which required performing a TURP.    DRAINS:  An 20-Gabonese 3 way Ames catheter in the bladder, with 20 mL sterile water in balloon, to saline  CBI by gravity, and gravity drainage.    COMPLICATIONS:  None.    SUMMARY:  After informed consent was obtained, the patient brought to the operating room and placed in  supine position. After adequate general anesthetic, they were positioned in dorsal lithotomy.  The genitalia was prepped and draped sterile manner. A time-out was performed and the  patient identified using 2 identifiers and procedure site was verified. A 21-Gabonese rigid  cystoscope was advanced into the bladder. The bladder was drained and examined thoroughly  with 30- and 70- degree lenses. The urethra was unremarkable. The above-mentioned findings  were seen. The ureteral orifices were in the normal position with clear efflux bilaterally. The  cystoscope was removed. We then placed a 26-Gabonese Storz continuous flow laserscope  sheath and visual obturator into the bladder without difficulty. The 1000 micron Holmium laser  fiber was then used to fragment the stone in standard fashion. We used settings of 1.0 Joules  and 50 pulses  per second. Total laser energy utilized was 2.5. All stone fragments were  evacuated with Urovac evacuator and sent to lab for stone analysis. There were no fragments  left in the bladder, and the ureteral orifices and verumontanum were intact.   At this point the only stone remaining were attached to the prostate mucosa therefore a bipolar loop was used to perform a TURP in order to remove the attached stones.    There was mild hematuria and bladder mucosal edema, and we elected to leave an indwelling catheter. The scope was removed and a Ames was placed without difficulty. The patient tolerated the procedure well. The patient was extubated and transported to the recovery room stable  condition.      [facility]    08/05/2025  2:15 PM

## 2025-08-05 NOTE — DISCHARGE INSTRUCTIONS
NO driving and NO alcohol consumption for 24 hours.     Monitor for infection such as fever or chills.     Drink 6 to 8 glasses of water a day and 3 to 4 glasses in the first few hours after the procedure to flush out your bladder and reduce irritation.    You may see some blood in your urine for a few days. This is normal.    Report to your nearest ER and/or notify your provider if you experience any trouble urinating, uncontrollable pain, abdominal pain, visible dime size clots in urine.     BLEEDING: if you experience uncontrollable bleeding, contact your doctor or go to ER    NAUSEA: due to the anesthesia, you may experience nausea for up to 24 hours. If nausea and vomiting last longer, contact your doctor.     INFECTION:  watch for any signs or symptoms of infection such as chills, fever, redness or drainage at surgical site. Notify your doctor     PAIN : take your pain medications as directed. If the pain medications are not helping, notify your doctor.

## 2025-08-06 VITALS
OXYGEN SATURATION: 98 % | DIASTOLIC BLOOD PRESSURE: 79 MMHG | SYSTOLIC BLOOD PRESSURE: 130 MMHG | WEIGHT: 255 LBS | RESPIRATION RATE: 16 BRPM | BODY MASS INDEX: 31.05 KG/M2 | HEART RATE: 82 BPM | TEMPERATURE: 98 F | HEIGHT: 76 IN

## (undated) DEVICE — LOOP PLASMALOOP 12-30DEG 24FR

## (undated) DEVICE — SUT MONOCRYL 2-0 S UND

## (undated) DEVICE — GLOVE PROTEXIS HYDROGEL SZ7.5

## (undated) DEVICE — COVER TABLE HVY DTY 60X90IN

## (undated) DEVICE — SPONGE LAP 18X18 PREWASHED

## (undated) DEVICE — CHLORAPREP W TINT 26ML APPL

## (undated) DEVICE — PADDING WYTEX UNDRCST 6INX4YD

## (undated) DEVICE — COVER MAYO STND XL 30X57IN

## (undated) DEVICE — PIN BONE 3.2X110MM
Type: IMPLANTABLE DEVICE | Site: KNEE | Status: NON-FUNCTIONAL
Removed: 2024-06-11

## (undated) DEVICE — SUT MCRYL PLUS 4-0 PS2 27IN

## (undated) DEVICE — APPLICATOR CHLORAPREP ORN 26ML

## (undated) DEVICE — BLADE SURG STAINLESS STEEL #15

## (undated) DEVICE — SUT VICRYL BR 1 GEN 27 CT-1

## (undated) DEVICE — KIT TRIATHLON CR TIB PREP SZ7

## (undated) DEVICE — CONTAINER SPECIMEN OR STER 4OZ

## (undated) DEVICE — DRAPE MEDIUM SHEET 40X70IN

## (undated) DEVICE — KIT VIZADISC KNEE TRACKING

## (undated) DEVICE — TRAY SKIN SCRUB WET PREMIUM

## (undated) DEVICE — PIN BONE 4 X 140MM STERILE
Type: IMPLANTABLE DEVICE | Site: KNEE | Status: NON-FUNCTIONAL
Removed: 2024-06-11

## (undated) DEVICE — DEVICE STRATAFIX SYMMETRIC +

## (undated) DEVICE — GLOVE SENSICARE PI GRN 6.5

## (undated) DEVICE — BLADE TONGUE DEPRESSOR AD 6IN

## (undated) DEVICE — TOURNIQUET SB QC DP 34X4IN

## (undated) DEVICE — GOWN POLY REINF X-LONG 2XL

## (undated) DEVICE — IRRIGATOR SUCTION W/TIP

## (undated) DEVICE — KIT GEN LAPAROSCOPY LAFAYETTE

## (undated) DEVICE — SPONGE COTTON TRAY 4X4IN

## (undated) DEVICE — Device

## (undated) DEVICE — CUSHION  WC FOAM 20X20X.75IN

## (undated) DEVICE — TAPE SILK 3IN

## (undated) DEVICE — KIT DRAPE RIO ONE PIECE W/POCK

## (undated) DEVICE — SOL IRRIGATION WATER 3000ML

## (undated) DEVICE — CORD SILICONE RETRACTOR

## (undated) DEVICE — SEAL UNIVERSAL 5MM-8MM XI

## (undated) DEVICE — DRAPE TOP 53X102IN

## (undated) DEVICE — KIT SURGICAL TURNOVER

## (undated) DEVICE — POSITIONER HEAD ADULT

## (undated) DEVICE — GLOVE SIGNATURE ESSNTL LTX 8

## (undated) DEVICE — DRAPE FULL SHEET 70X100IN

## (undated) DEVICE — FIBER LASER HOLMIUM 940 MICRON

## (undated) DEVICE — NDL HYPO STD REG BVL 18GX1.5IN

## (undated) DEVICE — BLADE SAG DUAL CUT 25X90MM

## (undated) DEVICE — SOL NACL IRR 1000ML BTL

## (undated) DEVICE — GLOVE SIGNATURE MICRO LTX 8

## (undated) DEVICE — PAD ABDOMINAL STERILE 8X10IN

## (undated) DEVICE — BOWL STERILE LG GRAD 32OZ

## (undated) DEVICE — SOL NORMAL USPCA 0.9%

## (undated) DEVICE — BAG DRAIN UROLOGY AND HOSE

## (undated) DEVICE — BLADE SAG DUAL CUT 18X90X1.35

## (undated) DEVICE — SOL IRR NACL .9% 3000ML

## (undated) DEVICE — DRAPE STERI U-SHAPED 47X51IN

## (undated) DEVICE — SOL CLEARIFY VISUALIZATION LAP

## (undated) DEVICE — PORT AIRSEAL 12/120MM LPI

## (undated) DEVICE — PENCIL SMOKE EVAC TELSCP 15FT

## (undated) DEVICE — SUT 2-0 VICRYL / SH (J417)

## (undated) DEVICE — BAG SPEC RETRV ENDO 4X6IN DISP

## (undated) DEVICE — SOL IRR NACL .9% 1000CC

## (undated) DEVICE — SOL NACL IRR 3000ML

## (undated) DEVICE — SUPPORT ULNA NERVE PROTECTOR

## (undated) DEVICE — STRIP MEDI WND CLSR 1/2X4IN

## (undated) DEVICE — SYR 50ML CATH TIP

## (undated) DEVICE — SUT MONOCRYL 3-0 PS-2 UND

## (undated) DEVICE — KIT CHECKPOINT TIBIAL

## (undated) DEVICE — TUBING 1/2 X 3/32 X 8 STERILE

## (undated) DEVICE — ELECTRODE PATIENT RETURN DISP

## (undated) DEVICE — BLADE MAKO STANDARD

## (undated) DEVICE — DRAPE ARM DAVINCI XI

## (undated) DEVICE — DRAPE COLUMN DAVINCI XI

## (undated) DEVICE — ADHESIVE DERMABOND ADVANCED

## (undated) DEVICE — CUP PROFEX GLASS GRADUATE 1OZ

## (undated) DEVICE — ELECTRODE REM PLYHSV RETURN 9

## (undated) DEVICE — KIT TRIATHLON CR FEM PREP SZ7

## (undated) DEVICE — PIN BONE 3.2X110MM
Type: IMPLANTABLE DEVICE | Site: KNEE | Status: NON-FUNCTIONAL
Removed: 2024-12-10

## (undated) DEVICE — SOL POVIDONE IODINE PCH 3/4OZ

## (undated) DEVICE — IRRIGATION SET Y-TYPE TUR/BLAD

## (undated) DEVICE — SYR 10CC LUER LOCK

## (undated) DEVICE — SET TRI-LUMEN FILTERED TUBE

## (undated) DEVICE — SYS IRRISEPT 450ML0.05% CHG

## (undated) DEVICE — SUT STRATAFIX 3-0 SH 8IN

## (undated) DEVICE — YANKAUER FLEX NO VENT REG CAP

## (undated) DEVICE — CLIP HEMO-LOK MLX LARGE LF

## (undated) DEVICE — PEROXIDE HYDROGEN 3% 16OZ

## (undated) DEVICE — SPONGE LAP 4X18 PREWASHED

## (undated) DEVICE — JELLY SURGILUBE LUBE TUBE 2OZ

## (undated) DEVICE — GLOVE SENSICARE PI MICRO 6

## (undated) DEVICE — NDL INSUF ULTRA VERESS 120MM

## (undated) DEVICE — SUT 1 36IN PDS II VIO MONO

## (undated) DEVICE — WRAP DEMAYO LEG STERILE

## (undated) DEVICE — OBTURATOR BLADELESS 8MM XI CLR

## (undated) DEVICE — KIT PREVENA PLUS CANSTR 150ML

## (undated) DEVICE — SOL IRRI STRL WATER 1000ML

## (undated) DEVICE — PIN BONE 4 X 140MM STERILE
Type: IMPLANTABLE DEVICE | Site: KNEE | Status: NON-FUNCTIONAL
Removed: 2024-12-10

## (undated) DEVICE — PAD PINK TRENDELENBURG POS XL

## (undated) DEVICE — BOWL STERILE LARGE 32OZ

## (undated) DEVICE — KIT TRIATHLON CR TIB PREP SZ6

## (undated) DEVICE — TRAY CATH 1-LYR URIMTR 16FR

## (undated) DEVICE — GOWN SMARTSLEEVE AAMI LVL4 LG

## (undated) DEVICE — SUT VICRYL + 0 27IN CT-2

## (undated) DEVICE — COVER TIP CURVED SCISSORS XI

## (undated) DEVICE — XPERIENCE IRRIGATION

## (undated) DEVICE — GLOVE SIGNATURE ESSNTL LTX 6